# Patient Record
Sex: FEMALE | Race: WHITE | NOT HISPANIC OR LATINO | Employment: OTHER | URBAN - METROPOLITAN AREA
[De-identification: names, ages, dates, MRNs, and addresses within clinical notes are randomized per-mention and may not be internally consistent; named-entity substitution may affect disease eponyms.]

---

## 2017-05-11 ENCOUNTER — GENERIC CONVERSION - ENCOUNTER (OUTPATIENT)
Dept: OTHER | Facility: OTHER | Age: 77
End: 2017-05-11

## 2017-07-24 ENCOUNTER — GENERIC CONVERSION - ENCOUNTER (OUTPATIENT)
Dept: OTHER | Facility: OTHER | Age: 77
End: 2017-07-24

## 2018-04-17 ENCOUNTER — EVALUATION (OUTPATIENT)
Dept: PHYSICAL THERAPY | Facility: CLINIC | Age: 78
End: 2018-04-17
Payer: MEDICARE

## 2018-04-17 DIAGNOSIS — M17.12 PRIMARY LOCALIZED OSTEOARTHROSIS OF LOWER LEG, LEFT: Primary | ICD-10-CM

## 2018-04-17 PROCEDURE — G8979 MOBILITY GOAL STATUS: HCPCS

## 2018-04-17 PROCEDURE — 97535 SELF CARE MNGMENT TRAINING: CPT

## 2018-04-17 PROCEDURE — G8978 MOBILITY CURRENT STATUS: HCPCS

## 2018-04-17 PROCEDURE — 97162 PT EVAL MOD COMPLEX 30 MIN: CPT

## 2018-04-17 NOTE — LETTER
2018    Sam Jamil PA-C  [address] [facility] [address]    Patient: Jayro Serrano   YOB: 1940   Date of Visit: 2018     Encounter Diagnosis     ICD-10-CM    1  Primary localized osteoarthrosis of lower leg, left M17 12        Dear Dr Reynaldo Munoz:    Please review the attached Plan of Care from ELLI LUIS recent visit  Please verify that you agree therapy should continue by signing the attached document and sending it back to our office  If you have any questions or concerns, please don't hesitate to call  Sincerely,    Fallon Mcdermott PT      Referring Provider:      I certify that I have read the below Plan of Care and certify the need for these services furnished under this plan of treatment while under my care  Sam Jamil PA-C  [address]: [phone]          PT Evaluation     Today's date: 2018  Patient name: Jayro Serrano  : 1940  MRN: [id number]  Referring provider: Ender Askew PA-C  Dx:   Encounter Diagnosis     ICD-10-CM    1  Primary localized osteoarthrosis of lower leg, left M17 12                   Assessment  Impairments: abnormal gait, abnormal or restricted ROM, impaired physical strength, lacks appropriate home exercise program and pain with function    Assessment details: Pt presents with reports of intermittent left knee pain  Pain primarily medial aspect  Has hx of TKR and revision prior  Reports consistent ache in knee  Reports difficulties with steps but no trouble ambulating on level surfaces  Reports knee does feel weak  Pt will benefit from PT tx to eliminate symptoms and restore normal functional level  Goals  ST  Decrease pain 50% 4 wk   2  Increase knee strength to 4+/5 4 wk  LT  Pt will report no pain 8 wk  2  Increase Knee ROM to WNL 8 wk  3  Increase knee strength to WNL 8 wk  4    Pt will report no limitations with ambulation on steps 8 wk  5  Pt will report no limitations with ADL's 8 wk    Plan  Patient would benefit from: PT eval  Planned modality interventions: cryotherapy and thermotherapy: hydrocollator packs  Planned therapy interventions: manual therapy, balance, strengthening, stretching, therapeutic exercise, home exercise program, functional ROM exercises and flexibility  Frequency: 3x week  Duration in weeks: 8  Treatment plan discussed with: patient        Subjective Evaluation    History of Present Illness  Mechanism of injury: Reports history of 2 left TKR  Reports she has been experiencing pain medial aspect of knee for the past few months  She reports pain lower tibial region at times as well  Went to Ortho MD and X-ray performed  Reports no abnormalities noted  Reports consistent "ache" in left knee due to multiple surgeries  Reports inconsistent instability/buckling left knee  Pain  Current pain ratin  At best pain ratin  At worst pain rating: 3  Location: Left Knee/Lower Tibia  Quality: dull ache          Objective     Tenderness   Left Knee   Tenderness in the MCL (distal) and pes anserinus       Additional Tenderness Details  Tender to palpation medial hamstring tendons, pes anserine region, and distal MCL    Active Range of Motion   Left Knee   Flexion: 120 degrees   Extension: 0 degrees     Right Knee   Flexion: 126 degrees   Extension: 0 degrees     Additional Active Range of Motion Details  Reports tightness at end rage flexion left  Decreased MM flexibility BLE    Mobility     Additional Mobility Details  Fair patellar mobility all planes    Strength/Myotome Testing     Left Knee   Flexion: 4  Extension: 4    Right Knee   Flexion: 5  Extension: 5    Additional Strength Details  Reports weakness with resisted extension left knee    Tests     Additional Tests Details  Discomfort medial knee with Valgus stress but no instability    Ambulation     Ambulation: Level Surfaces     Additional Level Surfaces Ambulation Details  Reports no significant limitations with gait on level surfaces    Ambulation: Stairs   Pattern: non-reciprocal  Pattern: non-reciprocal    Observational Gait   Decreased stride length         Flowsheet Rows    Flowsheet Row Most Recent Value   PT/OT G-Codes   Current Score  61   Projected Score  64   Assessment Type  Evaluation   G code set  Mobility: Walking & Moving Around   Mobility: Walking and Moving Around Current Status ()  CJ   Mobility: Walking and Moving Around Goal Status ()  CJ        Precautions:  Hx Left TKR and Revision    Specialty Daily Treatment Diary     Manual         Stretch BLE                                            Exercise Diary         [address] [facility]        nuep        TR/HR        Mini squat        Step ups        St hip flex/abd/ext        St hams curls        LAQ        Quad sets        Add sq        SAQ        Leg Press                                                                            Modalities        MHP        Cold Pack

## 2018-04-17 NOTE — PROGRESS NOTES
PT Evaluation     Today's date: 2018  Patient name: Lamin Nash  : 1940  MRN: [id number]  Referring provider: Shima Pena PA-C  Dx:   Encounter Diagnosis     ICD-10-CM    1  Primary localized osteoarthrosis of lower leg, left M17 12                   Assessment  Impairments: abnormal gait, abnormal or restricted ROM, impaired physical strength, lacks appropriate home exercise program and pain with function    Assessment details: Pt presents with reports of intermittent left knee pain  Pain primarily medial aspect  Has hx of TKR and revision prior  Reports consistent ache in knee  Reports difficulties with steps but no trouble ambulating on level surfaces  Reports knee does feel weak  Pt will benefit from PT tx to eliminate symptoms and restore normal functional level  Goals  ST  Decrease pain 50% 4 wk   2  Increase knee strength to 4+/5 4 wk  LT  Pt will report no pain 8 wk  2  Increase Knee ROM to WNL 8 wk  3  Increase knee strength to WNL 8 wk  4  Pt will report no limitations with ambulation on steps 8 wk  5  Pt will report no limitations with ADL's 8 wk    Plan  Patient would benefit from: PT eval  Planned modality interventions: cryotherapy and thermotherapy: hydrocollator packs  Planned therapy interventions: manual therapy, balance, strengthening, stretching, therapeutic exercise, home exercise program, functional ROM exercises and flexibility  Frequency: 3x week  Duration in weeks: 8  Treatment plan discussed with: patient        Subjective Evaluation    History of Present Illness  Mechanism of injury: Reports history of 2 left TKR  Reports she has been experiencing pain medial aspect of knee for the past few months  She reports pain lower tibial region at times as well  Went to Ortho MD and X-ray performed  Reports no abnormalities noted  Reports consistent "ache" in left knee due to multiple surgeries    Reports inconsistent instability/buckling left knee     Pain  Current pain ratin  At best pain ratin  At worst pain rating: 3  Location: Left Knee/Lower Tibia  Quality: dull ache          Objective     Tenderness   Left Knee   Tenderness in the MCL (distal) and pes anserinus  Additional Tenderness Details  Tender to palpation medial hamstring tendons, pes anserine region, and distal MCL    Active Range of Motion   Left Knee   Flexion: 120 degrees   Extension: 0 degrees     Right Knee   Flexion: 126 degrees   Extension: 0 degrees     Additional Active Range of Motion Details  Reports tightness at end rage flexion left  Decreased MM flexibility BLE    Mobility     Additional Mobility Details  Fair patellar mobility all planes    Strength/Myotome Testing     Left Knee   Flexion: 4  Extension: 4    Right Knee   Flexion: 5  Extension: 5    Additional Strength Details  Reports weakness with resisted extension left knee    Tests     Additional Tests Details  Discomfort medial knee with Valgus stress but no instability    Ambulation     Ambulation: Level Surfaces     Additional Level Surfaces Ambulation Details  Reports no significant limitations with gait on level surfaces    Ambulation: Stairs   Pattern: non-reciprocal  Pattern: non-reciprocal    Observational Gait   Decreased stride length         Flowsheet Rows    Flowsheet Row Most Recent Value   PT/OT G-Codes   Current Score  61   Projected Score  64   Assessment Type  Evaluation   G code set  Mobility: Walking & Moving Around   Mobility: Walking and Moving Around Current Status ()  CJ   Mobility: Walking and Moving Around Goal Status ()  CJ        Precautions:  Hx Left TKR and Revision    Specialty Daily Treatment Diary     Manual         Stretch BLE                                            Exercise Diary         [address] [facility]        TR/HR        Mini squat        Step ups        St hip flex/abd/ext        St hams curls        LAQ        Quad sets        Add sq        SAQ        Leg Press                                                                            Modalities        P        Cold Pack

## 2018-04-23 ENCOUNTER — TRANSCRIBE ORDERS (OUTPATIENT)
Dept: PHYSICAL THERAPY | Facility: CLINIC | Age: 78
End: 2018-04-23

## 2018-04-23 ENCOUNTER — OFFICE VISIT (OUTPATIENT)
Dept: PHYSICAL THERAPY | Facility: CLINIC | Age: 78
End: 2018-04-23
Payer: MEDICARE

## 2018-04-23 DIAGNOSIS — M17.12 OSTEOARTHRITIS OF LEFT KNEE, UNSPECIFIED OSTEOARTHRITIS TYPE: Primary | ICD-10-CM

## 2018-04-23 DIAGNOSIS — M17.12 PRIMARY LOCALIZED OSTEOARTHROSIS OF LOWER LEG, LEFT: Primary | ICD-10-CM

## 2018-04-23 PROCEDURE — 97110 THERAPEUTIC EXERCISES: CPT

## 2018-04-23 PROCEDURE — 97150 GROUP THERAPEUTIC PROCEDURES: CPT

## 2018-04-23 PROCEDURE — 97140 MANUAL THERAPY 1/> REGIONS: CPT

## 2018-04-23 NOTE — PROGRESS NOTES
Daily Note     Today's date: 2018  Patient name: Catarina Roberts  : 1940  MRN: [id number]  Referring provider: Jayne Mason PA-C  Dx:   Encounter Diagnosis     ICD-10-CM    1  Primary localized osteoarthrosis of lower leg, left M17 12                   Subjective: The patient states that she does her exercises at home  Objective: See treatment diary below      Assessment: Tolerated treatment well  Patient exhibited good technique with therapeutic exercises and would benefit from continued PT  The patient did have tightness in her L hamstrings  Plan: Continue per plan of care       Precautions:  Hx Left TKR and Revision     Specialty Daily Treatment Diary      Manual  18           Stretch BLE  15'                                                                         Exercise Diary   18           [address] [facility]  [place]           nustep L1 10'           TR/HR  30x           Mini squat  30x           Step ups             St hip flex/abd/ext  30x           St hams curls  30x           LAQ             Quad sets  :05 30x           Add sq  :05 30x           SAQ             Leg Press                                                                                                                                   Modalities 18           [facility]             Cold Pack Declined

## 2018-04-25 ENCOUNTER — OFFICE VISIT (OUTPATIENT)
Dept: PHYSICAL THERAPY | Facility: CLINIC | Age: 78
End: 2018-04-25
Payer: MEDICARE

## 2018-04-25 DIAGNOSIS — M17.12 PRIMARY LOCALIZED OSTEOARTHROSIS OF LOWER LEG, LEFT: Primary | ICD-10-CM

## 2018-04-25 PROCEDURE — 97140 MANUAL THERAPY 1/> REGIONS: CPT

## 2018-04-25 PROCEDURE — 97110 THERAPEUTIC EXERCISES: CPT

## 2018-04-25 NOTE — PROGRESS NOTES
Daily Note     Today's date: 2018  Patient name: Griselda Garcia  : 1940  MRN: [id number]  Referring provider: Carlos Pelayo PA-C  Dx:   Encounter Diagnosis     ICD-10-CM    1  Primary localized osteoarthrosis of lower leg, left M17 12                   Subjective:   I was very active the last few days"      Objective: See treatment diary below   Pain in 2/10      Assessment: Tolerated treatment well  Patient exhibited good technique with therapeutic exercises    Additional therex and weight tolerated without incidence  Plan: Continue per plan of care       Precautions:  Hx Left TKR and Revision     Specialty Daily Treatment Diary      Manual  18         Stretch BLE  15'  15                                                                       Exercise Diary   18         [address] [facility]  [place]         nustep L1 10'  l2 10         TR/HR  30x  30         Mini squat  30x  30         Step ups             St hip flex/abd/ext  30x  30         St hams curls  30x  30         LAQ    30         Quad sets  :05 30x  30  x5         Add sq  :05 30x  30  x5         SAQ    30         Leg Press                                                                                                                                   Modalities 18k         P             Cold Pack Declined

## 2018-04-30 ENCOUNTER — OFFICE VISIT (OUTPATIENT)
Dept: PHYSICAL THERAPY | Facility: CLINIC | Age: 78
End: 2018-04-30
Payer: MEDICARE

## 2018-04-30 DIAGNOSIS — M17.12 PRIMARY LOCALIZED OSTEOARTHROSIS OF LOWER LEG, LEFT: Primary | ICD-10-CM

## 2018-04-30 PROCEDURE — 97140 MANUAL THERAPY 1/> REGIONS: CPT

## 2018-04-30 PROCEDURE — 97110 THERAPEUTIC EXERCISES: CPT

## 2018-04-30 NOTE — PROGRESS NOTES
Daily Note     Today's date: 2018  Patient name: Connor East  : 1940  MRN: [id number]  Referring provider: Carlos Rouse PA-C  Dx:   Encounter Diagnosis     ICD-10-CM    1  Primary localized osteoarthrosis of lower leg, left M17 12                   Subjective: "Its actually feeling better"        Objective: See treatment diary below  Pain in 0/10      Assessment: Tolerated treatment well  Patient exhibited good technique with therapeutic exercises     Patient reports improved s/s since initiating PT and greater functional abilities  Additional therex tolerated without incidence  Plan: Continue per plan of care       Precautions:  Hx Left TKR and Revision     Specialty Daily Treatment Diary      Manual  18       Stretch BLE  15'  15  15                                                                     Exercise Diary   18       [address] [facility]  [place]  [place]  10   l       nustep L1 10'  l2 10  d/c       TR/HR  30x  30  30       Mini squat  30x  30 30       Step ups             St hip flex/abd/ext  30x  30  30       St hams curls  30x  30  30       LAQ    30  30       Quad sets  :05 30x  30  x5  30       Add sq  :05 30x  30  x5  30       SAQ    30  30       Leg Press      30 25#       Add supine      20" 5                                                                                                               Modalities 18k  18       MHP             Cold Pack Declined    declined

## 2018-05-02 ENCOUNTER — OFFICE VISIT (OUTPATIENT)
Dept: PHYSICAL THERAPY | Facility: CLINIC | Age: 78
End: 2018-05-02
Payer: MEDICARE

## 2018-05-02 DIAGNOSIS — M17.12 PRIMARY LOCALIZED OSTEOARTHROSIS OF LOWER LEG, LEFT: Primary | ICD-10-CM

## 2018-05-02 PROCEDURE — 97140 MANUAL THERAPY 1/> REGIONS: CPT

## 2018-05-02 PROCEDURE — 97110 THERAPEUTIC EXERCISES: CPT

## 2018-05-02 NOTE — PROGRESS NOTES
Daily Note     Today's date: 2018  Patient name: Carmelita Gilbert  : 1940  MRN: [id number]  Referring provider: Tello Aburto PA-C  Dx:   Encounter Diagnosis     ICD-10-CM    1  Primary localized osteoarthrosis of lower leg, left M17 12                   Subjective: The patient states that she is doing ok today  Objective: See treatment diary below      Assessment: Tolerated treatment well  Patient exhibited good technique with therapeutic exercises and would benefit from continued PT  Patient did well with her strengthening exercises  Plan: Continue per plan of care       Precautions:  Hx Left TKR and Revision     Specialty Daily Treatment Diary      Manual  18     Stretch BLE  15'  15  15 15'                                                                   Exercise Diary   18     [address] [facility]  [place]  10   l 10 L 2     nustep L1 10'  l2 10  d/c       TR/HR  30x  30  30  30x     Mini squat  30x  30 30  30x     Step ups             St hip flex/abd/ext  30x  30  30       St hams curls  30x  30  30  30x     LAQ    30  30  30x     Quad sets  :05 30x  30  x5  30       Add sq  :05 30x  30  x5  30  30x     SAQ    30  30       Leg Press      30 25# 30# 30x     Add supine      20" 5       Leg Curl       20# 30x     Leg Extension       15# 30x                                                                                 Modalities 18k  18     MHP             Cold Pack Declined    declined Declined

## 2018-05-04 ENCOUNTER — OFFICE VISIT (OUTPATIENT)
Dept: PHYSICAL THERAPY | Facility: CLINIC | Age: 78
End: 2018-05-04
Payer: MEDICARE

## 2018-05-04 DIAGNOSIS — M17.12 PRIMARY LOCALIZED OSTEOARTHROSIS OF LOWER LEG, LEFT: Primary | ICD-10-CM

## 2018-05-04 PROCEDURE — 97110 THERAPEUTIC EXERCISES: CPT

## 2018-05-04 PROCEDURE — 97140 MANUAL THERAPY 1/> REGIONS: CPT

## 2018-05-04 NOTE — PROGRESS NOTES
Daily Note     Today's date: 2018  Patient name: Mirna Giles  : 1940  MRN: [id number]  Referring provider: Bang Kerr PA-C  Dx:   Encounter Diagnosis     ICD-10-CM    1  Primary localized osteoarthrosis of lower leg, left M17 12                   Subjective: I'm tired today  Objective: See treatment diary below      Assessment: Tolerated treatment well  Pt requested modified tx due to feeling tired today  Reports BLE feeling improved  Reports intermittent LBP and LLE numbness  Patient demonstrated fatigue post treatment      Plan: Continue per plan of care         Precautions:  Hx Left TKR and Revision     Specialty Daily Treatment Diary      Manual  18   Stretch BLE  15'  15  15 15'  15'                                                                 Exercise Diary   18   [address] [facility]  [place]  10   l 10 L 2  L3 10'   nustep L1 10'  l2 10  d/c       TR/HR  30x  30  30  30x     Mini squat  30x  30 30  30x     Step ups             St hip flex/abd/ext  30x  30  30       St hams curls  30x  30  30  30x     LAQ    30  30  30x     Quad sets  :05 30x  30  x5  30       Add sq  :05 30x  30  x5  30  30x     SAQ    30  30       Leg Press      30 25# 30# 30x  30#  30x   Add supine      20" 5       Leg Curl       20# 30x  20#  30x   Leg Extension       15# 30x  15#  30x                                                                               Modalities 18k  18     MHP             Cold Pack Declined    declined Declined

## 2018-05-07 ENCOUNTER — OFFICE VISIT (OUTPATIENT)
Dept: PHYSICAL THERAPY | Facility: CLINIC | Age: 78
End: 2018-05-07
Payer: MEDICARE

## 2018-05-07 DIAGNOSIS — M17.12 PRIMARY LOCALIZED OSTEOARTHROSIS OF LOWER LEG, LEFT: Primary | ICD-10-CM

## 2018-05-07 PROCEDURE — 97110 THERAPEUTIC EXERCISES: CPT

## 2018-05-07 PROCEDURE — 97140 MANUAL THERAPY 1/> REGIONS: CPT

## 2018-05-07 NOTE — PROGRESS NOTES
Daily Note     Today's date: 2018  Patient name: Clay Latham  : 1940  MRN: [id number]  Referring provider: Paula Miller PA-C  Dx:   Encounter Diagnosis     ICD-10-CM    1  Primary localized osteoarthrosis of lower leg, left M17 12                   Subjective:   My back is hurting today"        Objective: See treatment diary below   Pain in 2/10   Out 2/10      Assessment:    Good tolerance with therex  Minimal end range pain evident  Tolerated additional therex without incidence  Plan: Continue per plan of care       Precautions:  Hx Left TKR and Revision     Specialty Daily Treatment Diary      Manual  18   Stretch BLE  15'  15  15 15'  15'                                                                 Exercise Diary   18   [address] [facility] L3 10  [place]  10   l 10 L 2  L3 10'   nustep D/c  l2 10  d/c       TR/HR    30  30  30x     Mini squat   30 30  30x     Step ups             St hip flex/abd/ext    30  30       St hams curls   30  30  30x     LAQ    30  30  30x     Quad sets  :  30  x5  30       Add sq    30  x5  30  30x     SAQ    30  30       Leg Press  30# 30x    30 25# 30# 30x  30#  30x   Add supine      20" 5       Leg Curl  20X 30x     20# 30x  20#  30x   Leg Extension  15# 30x     15# 30x  15#  30x                                                                               Modalities 18k  18     MHP             Cold Pack Declined Declined  declined Declined

## 2018-05-09 ENCOUNTER — OFFICE VISIT (OUTPATIENT)
Dept: PHYSICAL THERAPY | Facility: CLINIC | Age: 78
End: 2018-05-09
Payer: MEDICARE

## 2018-05-09 DIAGNOSIS — M17.12 PRIMARY LOCALIZED OSTEOARTHROSIS OF LOWER LEG, LEFT: Primary | ICD-10-CM

## 2018-05-09 PROCEDURE — 97140 MANUAL THERAPY 1/> REGIONS: CPT

## 2018-05-09 PROCEDURE — 97110 THERAPEUTIC EXERCISES: CPT

## 2018-05-09 NOTE — PROGRESS NOTES
Daily Note     Today's date: 2018  Patient name: Zhane Rome  : 1940  MRN: [id number]  Referring provider: Inés Ledezma PA-C  Dx:   Encounter Diagnosis     ICD-10-CM    1  Primary localized osteoarthrosis of lower leg, left M17 12                   Subjective:  "Im great"      Objective: See treatment diary below      Assessment: Tolerated treatment well  Patient exhibited good technique with therapeutic exercises     Additional weight with therex ok  Pt reported minimal SOB following [facility]   Pt declined modalities  Plan: Continue per plan of care       recautions:  Hx Left TKR and Revision     Specialty Daily Treatment Diary      Manual  18   Stretch BLE  15'  15  15 15'  15'                                                                 Exercise Diary   18   [facility] L3 10 L2 10  10   l 10 L 2  L3 10'   nustep D/c D/c  d/c       TR/HR    30  30  30x     Mini squat   30 30  30x     Step ups             St hip flex/abd/ext    30  1#  30       St hams curls   30   1#  30  30x     LAQ    30   1#  30  30x     Quad sets  :  30  x5  30       Add sq    30  x5  30  30x     SAQ    30  30       Leg Press  30# 30x  30# 30x  30 25# 30# 30x  30#  30x   Add supine      20" 5       Leg Curl  20X 30x  20# 30x   20# 30x  20#  30x   Leg Extension  15# 30x  15# 30   15# 30x  15#  30x                                                                               Modalities 18     MHP             Cold Pack Declined Declined  declined Declined

## 2018-05-10 ENCOUNTER — OFFICE VISIT (OUTPATIENT)
Dept: PHYSICAL THERAPY | Facility: CLINIC | Age: 78
End: 2018-05-10
Payer: MEDICARE

## 2018-05-10 DIAGNOSIS — M17.12 PRIMARY LOCALIZED OSTEOARTHROSIS OF LOWER LEG, LEFT: Primary | ICD-10-CM

## 2018-05-10 PROCEDURE — 97140 MANUAL THERAPY 1/> REGIONS: CPT

## 2018-05-10 PROCEDURE — 97110 THERAPEUTIC EXERCISES: CPT

## 2018-05-10 NOTE — PROGRESS NOTES
Daily Note     Today's date: 5/10/2018  Patient name: Josselin Patrick  : 1940  MRN: [id number]  Referring provider: Brandon Sy PA-C  Dx:   Encounter Diagnosis     ICD-10-CM    1  Primary localized osteoarthrosis of lower leg, left M17 12                   Subjective:  "Im great"      Objective: See treatment diary below      Assessment: Tolerated treatment well  Patient exhibited good technique with therapeutic exercises     Additional weight with therex ok  Pt reported minimal SOB following [facility]   Pt declined modalities  Plan: Continue per plan of care  recautions:  Hx Left TKR and Revision     Specialty Daily Treatment Diary      Manual  5/7/18 5/9/18 5/10/18 5/2/18  5-4-18   Stretch BLE  15'  15  15 15'  15'                                                                 Exercise Diary   5/7/18  5/9/18  5/10/18 5/2/18  5-4-18   [facility] L3 10 L2 10  10    10 L 2  L3 10'   nustep D/c D/c        TR/HR    30  30  30x     Mini squat   30 30  30x     Step ups             St hip flex/abd/ext    30  1#  301#       St hams curls   30   1#  301#  30x     LAQ    30   1#  301#  30x     Quad sets  :  30  x5  301#       Add sq    30  x5  30  30x     SAQ    30  30       Leg Press  30# 30x  30# 30x  30 25# 30# 30x  30#  30x   Add supine      20" 5       Leg Curl  20X 30x  20# 30x   20# 30x  20#  30x   Leg Extension  15# 30x  15# 30   15# 30x  15#  30x                                                                               Modalities 4/23/18  4/25/18 5/10/18 5/2/18     MHP            Cold Pack Declined Declined  declined Declined                 Daily Note     Today's date: 5/10/2018  Patient name: Josselin Patrick  : 1940  MRN: [id number]  Referring provider: Brandon Sy PA-C  Dx:   Encounter Diagnosis     ICD-10-CM    1  Primary localized osteoarthrosis of lower leg, left M17 12                   Subjective:  Im ok      Objective: See treatment diary below      Assessment: Tolerated treatment well   Patient exhibited good technique with therapeutic exercises      Plan: Continue per plan of care      ecautions:  Hx Left TKR and Revision     Specialty Daily Treatment Diary      Manual  5/7/18 5/9/18 5/10/18 5/2/18  5-4-18   Stretch BLE  15'  15  15 15'  15'                                                                 Exercise Diary   5/7/18  5/9/18 5/10/18 5/2/18  5-4-18   [facility] L3 10 L2 10  10    10 L 2  L3 10'   nustep D/c D/c  d/c       TR/HR    30  30  30x     Mini squat   30 30  30x     Step ups             St hip flex/abd/ext    30  1#  301#       St hams curls   30   1#  301#  30x     LAQ    30   1#  30 1#  30x     Quad sets  :  30  x5  30       Add sq    30  x5  30  30x     SAQ    30  30       Leg Press  30# 30x  30# 30x  30 25# 30# 30x  30#  30x   Add supine      20" 5       Leg Curl  20X 30x  20# 30x  20# 30x 20# 30x  20#  30x   Leg Extension  15# 30x  15# 30  15# 30 15# 30x  15#  30x                                                                               Modalities 4/23/18  4/25/18  5/10/18 5/2/18     MHP             Cold Pack Declined Declined  declined Declined

## 2018-05-14 ENCOUNTER — APPOINTMENT (OUTPATIENT)
Dept: PHYSICAL THERAPY | Facility: CLINIC | Age: 78
End: 2018-05-14
Payer: MEDICARE

## 2018-05-16 ENCOUNTER — EVALUATION (OUTPATIENT)
Dept: PHYSICAL THERAPY | Facility: CLINIC | Age: 78
End: 2018-05-16
Payer: MEDICARE

## 2018-05-16 DIAGNOSIS — M17.12 PRIMARY LOCALIZED OSTEOARTHROSIS OF LOWER LEG, LEFT: Primary | ICD-10-CM

## 2018-05-16 PROCEDURE — 97164 PT RE-EVAL EST PLAN CARE: CPT

## 2018-05-16 PROCEDURE — 97140 MANUAL THERAPY 1/> REGIONS: CPT

## 2018-05-16 PROCEDURE — G8979 MOBILITY GOAL STATUS: HCPCS

## 2018-05-16 PROCEDURE — G8978 MOBILITY CURRENT STATUS: HCPCS

## 2018-05-16 PROCEDURE — 97110 THERAPEUTIC EXERCISES: CPT

## 2018-05-16 NOTE — PROGRESS NOTES
Daily Note     Today's date: 2018  Patient name: Lynda Elder  : 1940  MRN: [id number]  Referring provider: Lele Bowens PA-C  Dx:   Encounter Diagnosis     ICD-10-CM    1  Primary localized osteoarthrosis of lower leg, left M17 12                   Subjective: The patient states that she is ok today  Objective: See treatment diary below      Assessment: Tolerated treatment well  Patient exhibited good technique with therapeutic exercises and would benefit from continued PT  The patient did have better strength in her L LE  ReEval performed during today's therapy session  Plan: Continue per plan of care       recautions:  Hx Left TKR and Revision     Specialty Daily Treatment Diary      Manual  5/7/18 5/9/18 5/10/18 5/16/18  5-4-18   Stretch BLE  15'  15  15 15'  15'                                                                 Exercise Diary   5/7/18  5/9/18  5/10/18 5/16/18  5-4-18   [address] [facility] L3 10 L2 10  10    10 L 3  L3 10'   nustep D/c D/c         TR/HR    30  30  30x     Mini squat    30 30  30x     Step ups             St hip flex/abd/ext    30  1#  301#  2# 30x     St hams curls    30   1#  301#  30x 2#     LAQ    30   1#  301#  30x 2#     Quad sets  :  30  x5  301#  30x 2#     Add sq    30  x5  30  30x 5"     SAQ    30  30       Leg Press  30# 30x  30# 30x  30 25# 30# 30x  30#  30x   Add supine      20" 5       Leg Curl  20X 30x  20# 30x   20# 30x  20#  30x   Leg Extension  15# 30x  15# 30   15# 30x  15#  30x                                                                               Modalities 4/23/18  4/25/18 5/10/18 5/16/18     MHP             Cold Pack Declined Declined  declined Declined                 Nutrition Services    Calorie Counts  5/18: 1365 kcal, 45 g protein (3 meals (includes outside food), 2 Ensure Clear)      Stephanie Mesa, RD

## 2018-05-16 NOTE — LETTER
May 18, 2018    Violeta Zheng PA-C  [address] [place] [address]    Patient: Ethan Rodriguez   YOB: 1940   Date of Visit: 2018     Encounter Diagnosis     ICD-10-CM    1  Primary localized osteoarthrosis of lower leg, left M17 12        Dear Dr Gen Rose:    Please review the attached Plan of Care from ELLI LUIS recent visit  Please verify that you agree therapy should continue by signing the attached document and sending it back to our office  If you have any questions or concerns, please don't hesitate to call  Sincerely,    Amber Clay PT      Referring Provider:      I certify that I have read the below Plan of Care and certify the need for these services furnished under this plan of treatment while under my care  Vioelta Zheng PA-C  [place]: [phone]          Daily Note     Today's date: 2018  Patient name: Ethan Rodriguez  : 1940  MRN: [id number]  Referring provider: Rossi Waldrop PA-C  Dx:   Encounter Diagnosis     ICD-10-CM    1  Primary localized osteoarthrosis of lower leg, left M17 12                   Subjective: The patient states that she is ok today  Objective: See treatment diary below      Assessment: Tolerated treatment well  Patient exhibited good technique with therapeutic exercises and would benefit from continued PT  The patient did have better strength in her L LE  ReEval performed during today's therapy session  Plan: Continue per plan of care       recautions:  Hx Left TKR and Revision     Specialty Daily Treatment Diary      Manual  5/7/18 5/9/18 5/10/18 5/16/18  5-4-18   Stretch BLE  15'  15  15 15'  15'                                                                 Exercise Diary   5/7/18  5/9/18  5/10/18 5/16/18  5-4-18   [facility] L3 10 L2 10  10    10 L 3  L3 10'   nustep D/c D/c         TR/HR    30  30  30x     Mini squat    30 30  30x     Step ups             St hip flex/abd/ext    30  1#  301#  2# 30x     St hams curls    30   1#  301#  30x 2#     LAQ    30   1#  301#  30x 2#     Quad sets  :  30  x5  301#  30x 2#     Add sq    30  x5  30  30x 5"     SAQ    30  30       Leg Press  30# 30x  30# 30x  30 25# 30# 30x  30#  30x   Add supine      20" 5       Leg Curl  20X 30x  20# 30x   20# 30x  20#  30x   Leg Extension  15# 30x  15# 30   15# 30x  15#  30x                                                                               Modalities 4/23/18  4/25/18 5/10/18 5/16/18     MHP             Cold Pack Declined Declined  declined Declined                           PT Re-Evaluation/Discharge    Today's date: 2018  Patient name: Ryan Alejandro  : 1940  MRN: [id number]  Referring provider: Marisela Santana PA-C  Dx:   Encounter Diagnosis     ICD-10-CM    1  Primary localized osteoarthrosis of lower leg, left M17 12        Start Time: 0900  Stop Time: 1000  Total time in clinic (min): 60 minutes    Assessment  Impairments: abnormal gait, abnormal or restricted ROM, impaired physical strength, lacks appropriate home exercise program and pain with function    Assessment details: Pt presented with reports of intermittent left knee pain  She progressed well with PT tx  She reports no pain or limitations with gait or ADL's  At this time pt requests transition to HEP as she feels no further PT tx needed  D/C PT services  Goals  ST  Decrease pain 50% 4 wk   2  Increase knee strength to 4+/5 4 wk  LT  Pt will report no pain 8 wk  2  Increase Knee ROM to WNL 8 wk  3  Increase knee strength to WNL 8 wk  4  Pt will report no limitations with ambulation on steps 8 wk  5    Pt will report no limitations with ADL's 8 wk    Plan  Patient would benefit from: PT eval  Planned modality interventions: cryotherapy and thermotherapy: hydrocollator packs  Planned therapy interventions: manual therapy, balance, strengthening, stretching, therapeutic exercise, home exercise program, functional ROM exercises and flexibility  Treatment plan discussed with: patient  Plan details: D/C PT services        Subjective Evaluation    History of Present Illness  Mechanism of injury: Reports history of 2 left TKR  Reports she has been experiencing pain medial aspect of knee for the past few months  She reports pain lower tibial region at times as well  Went to Ortho MD and X-ray performed  Reports no abnormalities noted  Reports consistent "ache" in left knee due to multiple surgeries  Reports inconsistent instability/buckling left knee      Pain  Current pain ratin  At best pain ratin  At worst pain ratin  Location: Left Knee/Lower Tibia          Objective     Tenderness     Additional Tenderness Details  No tenderness noted    Active Range of Motion   Left Knee   Flexion: 110 degrees   Extension: 0 degrees     Right Knee   Flexion: 120 degrees   Extension: 0 degrees     Additional Active Range of Motion Details  Reports tightness at end rage flexion left      Mobility     Additional Mobility Details  Fair patellar mobility all planes    Strength/Myotome Testing     Left Knee   Flexion: 5  Extension: 5    Right Knee   Flexion: 5  Extension: 5    Ambulation     Ambulation: Level Surfaces     Additional Level Surfaces Ambulation Details  Reports no significant limitations with gait on level surfaces or steps      Flowsheet Rows      Most Recent Value   PT/OT G-Codes   Current Score  83   Projected Score  64   Assessment Type  Re-evaluation   G code set  Mobility: Walking & Moving Around   Mobility: Walking and Moving Around Current Status ()  CI   Mobility: Walking and Moving Around Goal Status ()  CJ

## 2018-05-16 NOTE — PROGRESS NOTES
PT Re-Evaluation/Discharge    Today's date: 2018  Patient name: Monae Bal  : 1940  MRN: [id number]  Referring provider: Daniel Escalona PA-C  Dx:   Encounter Diagnosis     ICD-10-CM    1  Primary localized osteoarthrosis of lower leg, left M17 12        Start Time: 0900  Stop Time: 1000  Total time in clinic (min): 60 minutes    Assessment  Impairments: abnormal gait, abnormal or restricted ROM, impaired physical strength, lacks appropriate home exercise program and pain with function    Assessment details: Pt presented with reports of intermittent left knee pain  She progressed well with PT tx  She reports no pain or limitations with gait or ADL's  At this time pt requests transition to HEP as she feels no further PT tx needed  D/C PT services  Goals  ST  Decrease pain 50% 4 wk   2  Increase knee strength to 4+/5 4 wk  LT  Pt will report no pain 8 wk  2  Increase Knee ROM to WNL 8 wk  3  Increase knee strength to WNL 8 wk  4  Pt will report no limitations with ambulation on steps 8 wk  5  Pt will report no limitations with ADL's 8 wk    Plan  Patient would benefit from: PT eval  Planned modality interventions: cryotherapy and thermotherapy: hydrocollator packs  Planned therapy interventions: manual therapy, balance, strengthening, stretching, therapeutic exercise, home exercise program, functional ROM exercises and flexibility  Treatment plan discussed with: patient  Plan details: D/C PT services        Subjective Evaluation    History of Present Illness  Mechanism of injury: Reports history of 2 left TKR  Reports she has been experiencing pain medial aspect of knee for the past few months  She reports pain lower tibial region at times as well  Went to Ortho MD and X-ray performed  Reports no abnormalities noted  Reports consistent "ache" in left knee due to multiple surgeries  Reports inconsistent instability/buckling left knee      Pain  Current pain rating: 0  At best pain ratin  At worst pain ratin  Location: Left Knee/Lower Tibia          Objective     Tenderness     Additional Tenderness Details  No tenderness noted    Active Range of Motion   Left Knee   Flexion: 110 degrees   Extension: 0 degrees     Right Knee   Flexion: 120 degrees   Extension: 0 degrees     Additional Active Range of Motion Details  Reports tightness at end rage flexion left      Mobility     Additional Mobility Details  Fair patellar mobility all planes    Strength/Myotome Testing     Left Knee   Flexion: 5  Extension: 5    Right Knee   Flexion: 5  Extension: 5    Ambulation     Ambulation: Level Surfaces     Additional Level Surfaces Ambulation Details  Reports no significant limitations with gait on level surfaces or steps      Flowsheet Rows      Most Recent Value   PT/OT G-Codes   Current Score  83   Projected Score  64   Assessment Type  Re-evaluation   G code set  Mobility: Walking & Moving Around   Mobility: Walking and Moving Around Current Status ()  CI   Mobility: Walking and Moving Around Goal Status ()  CJ

## 2018-05-18 ENCOUNTER — APPOINTMENT (OUTPATIENT)
Dept: PHYSICAL THERAPY | Facility: CLINIC | Age: 78
End: 2018-05-18
Payer: MEDICARE

## 2018-05-24 ENCOUNTER — TRANSCRIBE ORDERS (OUTPATIENT)
Dept: PHYSICAL THERAPY | Facility: CLINIC | Age: 78
End: 2018-05-24

## 2018-05-24 DIAGNOSIS — M17.12 PRIMARY LOCALIZED OSTEOARTHROSIS OF LOWER LEG, LEFT: Primary | ICD-10-CM

## 2019-01-14 ENCOUNTER — TELEPHONE (OUTPATIENT)
Dept: INTERNAL MEDICINE CLINIC | Facility: CLINIC | Age: 79
End: 2019-01-14

## 2019-01-18 ENCOUNTER — HOSPITAL ENCOUNTER (EMERGENCY)
Facility: HOSPITAL | Age: 79
Discharge: HOME | End: 2019-01-18
Attending: EMERGENCY MEDICINE | Admitting: EMERGENCY MEDICINE
Payer: MEDICARE

## 2019-01-18 ENCOUNTER — APPOINTMENT (EMERGENCY)
Dept: RADIOLOGY | Facility: HOSPITAL | Age: 79
End: 2019-01-18
Payer: MEDICARE

## 2019-01-18 VITALS
HEART RATE: 74 BPM | WEIGHT: 210.1 LBS | OXYGEN SATURATION: 96 % | BODY MASS INDEX: 38.43 KG/M2 | SYSTOLIC BLOOD PRESSURE: 135 MMHG | DIASTOLIC BLOOD PRESSURE: 61 MMHG | RESPIRATION RATE: 18 BRPM | TEMPERATURE: 98.1 F

## 2019-01-18 DIAGNOSIS — R53.1 WEAKNESS: Primary | ICD-10-CM

## 2019-01-18 LAB
ALBUMIN SERPL BCP-MCNC: 3.8 G/DL (ref 3.5–5)
ALP SERPL-CCNC: 95 U/L (ref 46–116)
ALT SERPL W P-5'-P-CCNC: 26 U/L (ref 12–78)
ANION GAP SERPL CALCULATED.3IONS-SCNC: 8 MMOL/L (ref 4–13)
AST SERPL W P-5'-P-CCNC: 19 U/L (ref 5–45)
ATRIAL RATE: 77 BPM
BASOPHILS # BLD AUTO: 0.08 THOUSANDS/ΜL (ref 0–0.1)
BASOPHILS NFR BLD AUTO: 2 % (ref 0–1)
BILIRUB SERPL-MCNC: 0.4 MG/DL (ref 0.2–1)
BUN SERPL-MCNC: 20 MG/DL (ref 5–25)
CALCIUM SERPL-MCNC: 8.7 MG/DL (ref 8.3–10.1)
CHLORIDE SERPL-SCNC: 102 MMOL/L (ref 100–108)
CO2 SERPL-SCNC: 29 MMOL/L (ref 21–32)
CREAT SERPL-MCNC: 0.82 MG/DL (ref 0.6–1.3)
EOSINOPHIL # BLD AUTO: 0.21 THOUSAND/ΜL (ref 0–0.61)
EOSINOPHIL NFR BLD AUTO: 4 % (ref 0–6)
ERYTHROCYTE [DISTWIDTH] IN BLOOD BY AUTOMATED COUNT: 13.5 % (ref 11.6–15.1)
GFR SERPL CREATININE-BSD FRML MDRD: 69 ML/MIN/1.73SQ M
GLUCOSE SERPL-MCNC: 103 MG/DL (ref 65–140)
HCT VFR BLD AUTO: 45 % (ref 34.8–46.1)
HGB BLD-MCNC: 15 G/DL (ref 11.5–15.4)
IMM GRANULOCYTES # BLD AUTO: 0.01 THOUSAND/UL (ref 0–0.2)
IMM GRANULOCYTES NFR BLD AUTO: 0 % (ref 0–2)
LYMPHOCYTES # BLD AUTO: 0.93 THOUSANDS/ΜL (ref 0.6–4.47)
LYMPHOCYTES NFR BLD AUTO: 18 % (ref 14–44)
MAGNESIUM SERPL-MCNC: 1.8 MG/DL (ref 1.6–2.6)
MCH RBC QN AUTO: 29.2 PG (ref 26.8–34.3)
MCHC RBC AUTO-ENTMCNC: 33.3 G/DL (ref 31.4–37.4)
MCV RBC AUTO: 88 FL (ref 82–98)
MONOCYTES # BLD AUTO: 0.53 THOUSAND/ΜL (ref 0.17–1.22)
MONOCYTES NFR BLD AUTO: 10 % (ref 4–12)
NEUTROPHILS # BLD AUTO: 3.35 THOUSANDS/ΜL (ref 1.85–7.62)
NEUTS SEG NFR BLD AUTO: 66 % (ref 43–75)
NRBC BLD AUTO-RTO: 0 /100 WBCS
P AXIS: 39 DEGREES
PLATELET # BLD AUTO: 193 THOUSANDS/UL (ref 149–390)
PMV BLD AUTO: 9.6 FL (ref 8.9–12.7)
POTASSIUM SERPL-SCNC: 3.9 MMOL/L (ref 3.5–5.3)
PR INTERVAL: 136 MS
PROT SERPL-MCNC: 7.2 G/DL (ref 6.4–8.2)
QRS AXIS: 43 DEGREES
QRSD INTERVAL: 78 MS
QT INTERVAL: 392 MS
QTC INTERVAL: 443 MS
RBC # BLD AUTO: 5.14 MILLION/UL (ref 3.81–5.12)
SODIUM SERPL-SCNC: 139 MMOL/L (ref 136–145)
T WAVE AXIS: 86 DEGREES
TROPONIN I SERPL-MCNC: <0.02 NG/ML
VENTRICULAR RATE: 77 BPM
WBC # BLD AUTO: 5.11 THOUSAND/UL (ref 4.31–10.16)

## 2019-01-18 PROCEDURE — 36415 COLL VENOUS BLD VENIPUNCTURE: CPT | Performed by: EMERGENCY MEDICINE

## 2019-01-18 PROCEDURE — 93005 ELECTROCARDIOGRAM TRACING: CPT

## 2019-01-18 PROCEDURE — 80053 COMPREHEN METABOLIC PANEL: CPT | Performed by: EMERGENCY MEDICINE

## 2019-01-18 PROCEDURE — 96360 HYDRATION IV INFUSION INIT: CPT

## 2019-01-18 PROCEDURE — 85025 COMPLETE CBC W/AUTO DIFF WBC: CPT | Performed by: EMERGENCY MEDICINE

## 2019-01-18 PROCEDURE — 93010 ELECTROCARDIOGRAM REPORT: CPT | Performed by: INTERNAL MEDICINE

## 2019-01-18 PROCEDURE — 83735 ASSAY OF MAGNESIUM: CPT | Performed by: EMERGENCY MEDICINE

## 2019-01-18 PROCEDURE — 99285 EMERGENCY DEPT VISIT HI MDM: CPT

## 2019-01-18 PROCEDURE — 84484 ASSAY OF TROPONIN QUANT: CPT | Performed by: EMERGENCY MEDICINE

## 2019-01-18 PROCEDURE — 71046 X-RAY EXAM CHEST 2 VIEWS: CPT

## 2019-01-18 RX ORDER — ASPIRIN 81 MG/1
81 TABLET, CHEWABLE ORAL DAILY
COMMUNITY

## 2019-01-18 RX ORDER — FLUOXETINE HYDROCHLORIDE 40 MG/1
40 CAPSULE ORAL DAILY
COMMUNITY

## 2019-01-18 RX ORDER — HYDROCHLOROTHIAZIDE 25 MG/1
25 TABLET ORAL DAILY
COMMUNITY

## 2019-01-18 RX ORDER — PANTOPRAZOLE SODIUM 40 MG/1
40 TABLET, DELAYED RELEASE ORAL DAILY
COMMUNITY

## 2019-01-18 RX ORDER — SIMVASTATIN 10 MG
10 TABLET ORAL
COMMUNITY

## 2019-01-18 RX ORDER — ALBUTEROL SULFATE 90 UG/1
1 AEROSOL, METERED RESPIRATORY (INHALATION) EVERY 6 HOURS PRN
COMMUNITY
End: 2020-09-29 | Stop reason: SDUPTHER

## 2019-01-18 RX ORDER — LEVOTHYROXINE SODIUM 0.05 MG/1
50 TABLET ORAL DAILY
COMMUNITY

## 2019-01-18 RX ADMIN — SODIUM CHLORIDE 1000 ML: 0.9 INJECTION, SOLUTION INTRAVENOUS at 13:41

## 2019-01-18 NOTE — ED PROVIDER NOTES
Pt Name: Librado Rhodes  MRN: [id number]  [place] 1940  Age/Sex: 69 y o  female  Date of evaluation: 2019  PCP: Alen Becker, [address] [place] [address]    Chief Complaint   Patient presents with    Weakness - Generalized     pt co of generalized weakness and hand tremors onset 10 days ago  dizzeiness "when i get up suddenely", per EMS pt was takig double dose of omeprazole          HPI    69 y o  female presenting with approximately 10 days of generalized mild weakness and occasional hand tremors  Patient notes that close friend of hers recently  suddenly a few weeks ago but feels that this is unrelated states that she has, and not feeling anxious  She also notes that she has been taking a double dose of her omeprazole for the last 30 days  Patient still able to walk and go about her business normally, eat and drink normally, but states that she overall feels somewhat weak and noticed that her hands shake a little bit on occasion  She expresses concern that her magnesium may be low from the omeprazole overdose or they could be primary side effect  She denies chest pain, shortness of breath, fever, trauma, focal numbness or weakness, changes in speech or vision, other symptoms  She denies alcohol use or benzodiazepine use  HPI      Past Medical and Surgical History    Past Medical History:   Diagnosis Date    Anxiety     COPD (chronic obstructive pulmonary disease) ([address] )     Depression     Disease of thyroid gland     Hypotension     Wrist fracture        Past Surgical History:   Procedure Laterality Date    BACK SURGERY  2017    INCONTINENCE SURGERY      NEPHRECTOMY TRANSPLANTED ORGAN      REPLACEMENT TOTAL KNEE BILATERAL         History reviewed  No pertinent family history      Social History   Substance Use Topics    Smoking status: Former Smoker    Smokeless tobacco: Never Used    Alcohol use No           Allergies    Allergies   Allergen Reactions    Fruit C [Ascorbate] Reports allergy to fresh fruit       Home Medications    Prior to Admission medications    Medication Sig Start Date End Date Taking? Authorizing Provider   UNKNOWN TO PATIENT     Historical Provider, MD           Review of Systems    Review of Systems   Constitutional: Negative for activity change, chills and fever  HENT: Negative for drooling and facial swelling  Eyes: Negative for pain, discharge and visual disturbance  Respiratory: Negative for apnea, cough, chest tightness, shortness of breath and wheezing  Cardiovascular: Negative for chest pain and leg swelling  Gastrointestinal: Negative for abdominal pain, constipation, diarrhea, nausea and vomiting  Genitourinary: Negative for difficulty urinating, dysuria and urgency  Musculoskeletal: Negative for arthralgias, back pain and gait problem  Skin: Negative for color change and rash  Neurological: Positive for tremors and weakness  Negative for dizziness, speech difficulty and headaches  Psychiatric/Behavioral: Negative for agitation, behavioral problems and confusion  All other systems reviewed and negative  Physical Exam      ED Triage Vitals   Temperature Pulse Respirations Blood Pressure SpO2   01/18/19 1311 01/18/19 1306 01/18/19 1306 01/18/19 1311 01/18/19 1306   98 1 °F (36 7 °C) 86 19 162/71 95 %      Temp Source Heart Rate Source Patient Position - Orthostatic VS BP Location FiO2 (%)   01/18/19 1311 01/18/19 1330 01/18/19 1330 01/18/19 1330 --   Oral Monitor Sitting Right arm       Pain Score       --                      Physical Exam   Constitutional: She is oriented to person, place, and time  She appears well-developed and well-nourished  HENT:   Head: Normocephalic and atraumatic  Nose: Nose normal    Mouth/Throat: Oropharynx is clear and moist    Eyes: Pupils are equal, round, and reactive to light  Conjunctivae and EOM are normal    Neck: Normal range of motion  Neck supple     Cardiovascular: Normal rate, regular rhythm, normal heart sounds and intact distal pulses  Pulmonary/Chest: Effort normal and breath sounds normal  No respiratory distress  She has no wheezes  She has no rales  Abdominal: Soft  She exhibits no distension  There is no tenderness  There is no rebound and no guarding  Musculoskeletal: Normal range of motion  She exhibits no edema or deformity  Neurological: She is alert and oriented to person, place, and time  No cranial nerve deficit or sensory deficit  She exhibits normal muscle tone  Coordination normal    Skin: Skin is warm and dry  No rash noted  No erythema  Psychiatric: She has a normal mood and affect   Her behavior is normal  Judgment and thought content normal             Diagnostic Results      Labs:    Results for orders placed or performed during the hospital encounter of 01/18/19   Comprehensive metabolic panel   Result Value Ref Range    Sodium 139 136 - 145 mmol/L    Potassium 3 9 3 5 - 5 3 mmol/L    Chloride 102 100 - 108 mmol/L    CO2 29 21 - 32 mmol/L    ANION GAP 8 4 - 13 mmol/L    BUN 20 5 - 25 mg/dL    Creatinine 0 82 0 60 - 1 30 mg/dL    Glucose 103 65 - 140 mg/dL    Calcium 8 7 8 3 - 10 1 mg/dL    AST 19 5 - 45 U/L    ALT 26 12 - 78 U/L    Alkaline Phosphatase 95 46 - 116 U/L    Total Protein 7 2 6 4 - 8 2 g/dL    Albumin 3 8 3 5 - 5 0 g/dL    Total Bilirubin 0 40 0 20 - 1 00 mg/dL    eGFR 69 ml/min/1 73sq m   CBC and differential   Result Value Ref Range    WBC 5 11 4 31 - 10 16 Thousand/uL    RBC 5 14 (H) 3 81 - 5 12 Million/uL    Hemoglobin 15 0 11 5 - 15 4 g/dL    Hematocrit 45 0 34 8 - 46 1 %    MCV 88 82 - 98 fL    MCH 29 2 26 8 - 34 3 pg    MCHC 33 3 31 4 - 37 4 g/dL    RDW 13 5 11 6 - 15 1 %    MPV 9 6 8 9 - 12 7 fL    Platelets 366 046 - 213 Thousands/uL    nRBC 0 /100 WBCs    Neutrophils Relative 66 43 - 75 %    Immat GRANS % 0 0 - 2 %    Lymphocytes Relative 18 14 - 44 %    Monocytes Relative 10 4 - 12 %    Eosinophils Relative 4 0 - 6 % Basophils Relative 2 (H) 0 - 1 %    Neutrophils Absolute 3 35 1 85 - 7 62 Thousands/µL    Immature Grans Absolute 0 01 0 00 - 0 20 Thousand/uL    Lymphocytes Absolute 0 93 0 60 - 4 47 Thousands/µL    Monocytes Absolute 0 53 0 17 - 1 22 Thousand/µL    Eosinophils Absolute 0 21 0 00 - 0 61 Thousand/µL    Basophils Absolute 0 08 0 00 - 0 10 Thousands/µL   Magnesium   Result Value Ref Range    Magnesium 1 8 1 6 - 2 6 mg/dL   Troponin I   Result Value Ref Range    Troponin I <0 02 <=0 04 ng/mL   ECG 12 lead   Result Value Ref Range    Ventricular Rate 77 BPM    Atrial Rate 77 BPM    WA Interval 136 ms    QRSD Interval 78 ms    QT Interval 392 ms    QTC Interval 443 ms    P [place] 39 degrees    QRS Axis 43 degrees    T Wave Axis 86 degrees       All labs reviewed and utilized in the medical decision making process    Radiology:    XR chest 2 views   Final Result      No acute cardiopulmonary disease  Workstation performed: [id number]             All radiology studies independently viewed by me and interpreted by the radiologist     Procedure    Procedures    CritCare Time      ED Course of Care and Re-Assessments        Medications   sodium chloride 0 9 % bolus 1,000 mL (0 mL Intravenous Stopped 1/18/19 1441)           FINAL IMPRESSION    Final diagnoses:   Weakness         DISPOSITION/PLAN    Vague generalized weakness as above  Vital signs reassuring, examination likewise reassuring  Screening lab sent returned reassuring  Low suspicion for toxic overdose, critical electrolyte abnormality, sepsis, meningitis, encephalitis, other acute life threat at this time  Symptoms resolved with 1 L normal saline IV  Discharged strict return precautions, follow up primary care doctor    Time reflects when diagnosis was documented in both MDM as applicable and the Disposition within this note     Time User Action Codes Description Comment    1/18/2019  3:07 PM Jamar Spear Add [R53 1] Weakness       ED Disposition ED Disposition Condition Comment    Discharge  Geovanny Torres discharge to home/self care  Condition at discharge: Good        Follow-up Information     Follow up With Specialties Details Why Contact Info    Beatriz Peterson DO Internal Medicine Go in 3 days As needed [address]  [phone]              PATIENT REFERRED TO:    Beatriz Peterson DO  [address] [address]  [phone]    Go in 3 days  As needed      DISCHARGE MEDICATIONS:    Discharge Medication List as of 1/18/2019  3:10 PM      CONTINUE these medications which have NOT CHANGED    Details   albuterol (PROVENTIL HFA,VENTOLIN HFA) 90 mcg/act inhaler Inhale 1 puff every 6 (six) hours as needed for wheezing, Historical Med      aspirin 81 mg chewable tablet Chew 81 mg daily, Historical Med      FLUoxetine (PROzac) 40 MG capsule Take 40 mg by mouth daily, Historical Med      hydrochlorothiazide (HYDRODIURIL) 25 mg tablet Take 25 mg by mouth daily, Historical Med      levothyroxine 50 mcg tablet Take 50 mcg by mouth daily, Historical Med      pantoprazole (PROTONIX) 40 mg tablet Take 40 mg by mouth daily, Historical Med      simvastatin (ZOCOR) 10 mg tablet Take 10 mg by mouth daily at bedtime, Historical Med             No discharge procedures on file           MD Jenni Ramos MD  01/18/19 1469

## 2019-01-18 NOTE — DISCHARGE INSTRUCTIONS
Weakness   WHAT YOU NEED TO KNOW:   Weakness is a loss of muscle strength  It may be caused by brain, nerve, or muscle problems  Physical and mental conditions such as heart problems, pregnancy, dehydration, or depression may also cause weakness  Reactions to certain drugs can cause weakness  Parts of your body may become weak if you need to wear a cast or splint or have been on bed rest for a long time  DISCHARGE INSTRUCTIONS:   Call 911 for any of the following:   · You have any of the following signs of a stroke:      ¨ Numbness or drooping on one side of your face     ¨ Weakness in an arm or leg    ¨ Confusion or difficulty speaking    ¨ Dizziness, a severe headache, or vision loss    · You lose feeling in your weakened body area  · You have electric shock-like feelings down your arms and legs when you flex or move your neck  · You have sudden or increased trouble speaking, swallowing, or breathing  Return to the emergency department if:   · You have severe pain in your back, arms, or legs that worsens  · You have sudden or worsened muscle weakness or loss of movement  · You are not able to control when you urinate or have a bowel movement  Contact your healthcare provider if:   · You feel depressed or anxious  · You have questions or concerns about your condition or care  Manage weakness:   · Use assistive devices as directed  These help protect you from injury  Examples include a walker or cane  Have someone install handrails in your home  These will help you get out of a bathtub or stand up from a toilet  Use a shower chair so you can sit while you shower  Sit down on the toilet or another chair to dry off and put on your clothes  Get help going up and down stairs if your legs are weak  · Go to physical or occupational therapy if directed  A physical therapist can teach you exercises to help strengthen weak muscles   An occupational therapist can show you ways to do your daily activities more easily  For example, light forks and spoons can be easier to use if you have hand weakness  You may also learn ways to organize your household items so you are not moving heavy items  · Balance rest with exercise  Exercise can help increase your muscle strength and energy  Do not exercise for long periods at a time  Take breaks often to rest  Too much exercise can cause muscle strain or make you more tired  Ask your healthcare provider how much exercise is right for you  · Eat a variety of healthy foods  Too much or too little food may cause weakness or tiredness  Ask your healthcare provider what a healthy amount of food is for you  Healthy foods include fruits, vegetables, whole-grain breads, low-fat dairy products, lean meats and fish, nuts, and cooked beans  · Do not smoke  Nicotine and other chemicals in cigarettes and cigars can make your symptoms worse, and can cause lung damage  Ask your healthcare provider for information if you currently smoke and need help to quit  E-cigarettes or smokeless tobacco still contain nicotine  Talk to your healthcare provider before you use these products  · Do not use caffeine, alcohol, or illegal drugs  These may cause muscle twitching, which could lead to worsened weakness  Follow up with your healthcare provider as directed:  Write down your questions so you remember to ask them during your visits  © 2017 [facility] Information is for End User's use only and may not be sold, redistributed or otherwise used for commercial purposes  All illustrations and images included in CareNotes® are the copyrighted property of [employer]  or [employer]  The above information is an  only  It is not intended as medical advice for individual conditions or treatments  Talk to your doctor, nurse or pharmacist before following any medical regimen to see if it is safe and effective for you

## 2019-02-05 ENCOUNTER — HOSPITAL ENCOUNTER (EMERGENCY)
Facility: HOSPITAL | Age: 79
Discharge: HOME | End: 2019-02-05
Attending: EMERGENCY MEDICINE | Admitting: EMERGENCY MEDICINE
Payer: MEDICARE

## 2019-02-05 ENCOUNTER — APPOINTMENT (EMERGENCY)
Dept: CT IMAGING | Facility: HOSPITAL | Age: 79
End: 2019-02-05
Payer: MEDICARE

## 2019-02-05 ENCOUNTER — APPOINTMENT (EMERGENCY)
Dept: RADIOLOGY | Facility: HOSPITAL | Age: 79
End: 2019-02-05
Payer: MEDICARE

## 2019-02-05 VITALS
DIASTOLIC BLOOD PRESSURE: 82 MMHG | WEIGHT: 203.2 LBS | SYSTOLIC BLOOD PRESSURE: 135 MMHG | BODY MASS INDEX: 37.39 KG/M2 | HEART RATE: 76 BPM | HEIGHT: 62 IN | RESPIRATION RATE: 16 BRPM | TEMPERATURE: 97.7 F | OXYGEN SATURATION: 96 %

## 2019-02-05 DIAGNOSIS — S69.92XA LEFT WRIST INJURY, INITIAL ENCOUNTER: Primary | ICD-10-CM
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
## 2019-02-05 DIAGNOSIS — S09.90XA HEAD INJURY: ICD-10-CM

## 2019-02-05 PROCEDURE — 72125 CT NECK SPINE W/O DYE: CPT

## 2019-02-05 PROCEDURE — 99284 EMERGENCY DEPT VISIT MOD MDM: CPT

## 2019-02-05 PROCEDURE — 73130 X-RAY EXAM OF HAND: CPT

## 2019-02-05 PROCEDURE — 70450 CT HEAD/BRAIN W/O DYE: CPT

## 2019-02-05 RX ORDER — TRAMADOL HYDROCHLORIDE 50 MG/1
50 TABLET ORAL EVERY 6 HOURS PRN
Qty: 15 TABLET | Refills: 0 | Status: SHIPPED | OUTPATIENT
Start: 2019-02-05 | End: 2019-02-15

## 2019-02-05 NOTE — DISCHARGE INSTRUCTIONS
Wrist Fracture in Adults   WHAT YOU NEED TO KNOW:   What is a wrist fracture? A wrist fracture is a break in one or more of the bones in your wrist  A wrist fracture may be caused by a fall, car accident, or sports injury  In older adults, a wrist fracture may be caused by weak bones  What are the signs and symptoms of a wrist fracture? · Pain, swelling, and bruising of your injured wrist    · Wrist pain that is worse when you hold something or put pressure on your wrist    · Weakness, numbness, or tingling in your injured hand or wrist    · Trouble moving your wrist, hand, or fingers    · A change in the shape of your wrist  How is a wrist fracture diagnosed? Your healthcare provider will examine you  You may need an x-ray, CT scan, or MRI  You may be given contrast liquid to help your wrist bones show up better in pictures  Tell the healthcare provider if you have ever had an allergic reaction to contrast liquid  Do not enter the MRI room with anything metal  Metal can cause serious injury  Tell the healthcare provider if you have any metal in or on your body  How is a wrist fracture treated? Treatment will depend on which wrist bone was broken and the kind of fracture you have  You may need any of the following:  · Medicine  may be given to decrease pain and swelling  You may need antibiotic medicine or a tetanus shot if there is a break in your skin  · A cast, splint, or brace  may be placed on your wrist to decrease movement  These devices will help hold the bones in place while they heal      · Traction  may be needed if your bone broke into 2 pieces  Traction pulls on the bone pieces to pull them back into place  A pin may be put in your bone or cast and hooked to ropes and a pulley  Weight is hung on the rope to help pull on the bones so they will heal correctly  · A closed reduction  is a procedure to put your bones into the correct position without surgery       · Surgery  may be needed to put your bones back into the correct position  Wires, pins, plates or screws may be used to help hold the bones in place  How can I manage my symptoms? · Rest  as much as possible  Do not play contact sports until the healthcare provider says it is okay  · Apply ice  on your wrist for 15 to 20 minutes every hour or as directed  Use an ice pack, or put crushed ice in a plastic bag  Cover it with a towel before you place it on your skin  Ice helps prevent tissue damage and decreases swelling and pain  · Elevate  your wrist above the level of your heart as often as possible  This will help decrease swelling and pain  Prop your wrist on pillows or blankets to keep it elevated comfortably  · Go to physical therapy  as directed  You may need physical therapy after your wrist heals and the cast is removed  A physical therapist can teach you exercises to help improve movement and strength and to decrease pain  When should I seek immediate care? · Your pain gets worse or does not get better after you take pain medicine  · Your cast or splint breaks, gets wet, or is damaged  · Your hand or fingers feel numb or cold  · Your hand or fingers turn white or blue  · Your splint or cast feels too tight  · You have more pain or swelling after the cast or splint is put on  When should I contact my healthcare provider? · You have a fever  · There is a foul smell or blood coming from under the cast     · You have questions or concerns about your condition or care  CARE AGREEMENT:   You have the right to help plan your care  Learn about your health condition and how it may be treated  Discuss treatment options with your caregivers to decide what care you want to receive  You always have the right to refuse treatment  The above information is an  only  It is not intended as medical advice for individual conditions or treatments   Talk to your doctor, nurse or pharmacist before following any medical regimen to see if it is safe and effective for you  © 2017 [address] Information is for End User's use only and may not be sold, redistributed or otherwise used for commercial purposes  All illustrations and images included in CareNotes® are the copyrighted property of [employer]  or [employer]  Concussion   WHAT YOU NEED TO KNOW:   What is a concussion? A concussion is a mild brain injury  It is usually caused by a bump or blow to the head from a fall, a motor vehicle crash, or a sports injury  Being shaken forcefully may also cause a concussion  What are the signs and symptoms of a concussion? Symptoms may occur right away, or they may appear days after the concussion  After the injury, you may have any of these symptoms:  · A mild to moderate headache    · Dizziness, loss of balance, or blurry vision    · Nausea or vomiting     · A change in mood, such as restlessness or irritability    · Trouble thinking, remembering things, or concentrating    · Ringing in the ears    · Drowsiness or decreased energy    · Changes in your normal sleeping pattern  How is a concussion diagnosed? Your healthcare provider will ask how you were injured, and about your symptoms  He will also examine you  You may need any of the following:  · A neurologic exam  is also called neuro signs, neuro checks, or neuro status  A neurologic exam can show healthcare providers how well your brain works after your injury  Healthcare providers will check how your pupils (black dots in the center of each eye) react to light  They may check your memory and how easily you wake up  Your hand grasp and balance may also be tested  · A CT, or MRI  of your head may be done  You may be given contrast liquid to help the pictures show up better  Tell the healthcare provider if you have ever had an allergic reaction to contrast liquid   Do not enter the MRI room with anything metal  Metal can cause serious injury  Tell the healthcare provider if you have any metal in or on your body  How is a concussion managed? Usually no treatment is needed for a mild concussion  Concussion symptoms usually go away within about 10 days  The following may be recommended to manage your symptoms:  · Rest  from physical and mental activities as directed  Mental activities are those that require thinking, concentration, and attention  You will need to rest until your symptoms are gone  Rest will allow you to recover from your concussion  Ask your healthcare provider when you can return to work and other daily activities  · Have someone stay with you for the first 24 hours after your injury  Your healthcare provider should be contacted if your symptoms get worse, or you develop new symptoms  · Do not participate in sports and physical activities until your healthcare provider says it is okay  They could make your symptoms worse or lead to another concussion  Your healthcare provider will tell you when it is okay for you to return to sports or physical activities  · Acetaminophen  may help to decrease headache pain  It is available without a doctor's order  Ask how much to take and how often to take it  Follow directions  Acetaminophen can cause liver damage if not taken correctly  · NSAIDs , such as ibuprofen, help decrease swelling and pain  NSAIDs can cause stomach bleeding or kidney problems in certain people  If you take blood thinner medicine, always ask your healthcare provider if NSAIDs are safe for you  Always read the medicine label and follow directions  How can I help prevent another concussion? · Wear protective sports equipment that fit properly  Helmets help decrease your risk of a serious brain injury  Talk to your healthcare provider about ways you can decrease your risk for a concussion if you play sports  · Wear your seat belt  every time you travel   This helps to decrease your risk for a head injury if you are in a car accident  Have someone else call 911 for the following:   · Someone tries to wake you and cannot do so  · You have a seizure, increasing confusion, or a change in personality  · Your speech becomes slurred, or you have new vision problems  When should I seek immediate care? · You have a severe headache that does not go away  · You have arm or leg weakness, numbness, or new problems with coordination  · You have blood or clear fluid coming out of the ears or nose  When should I contact my healthcare provider? · You have nausea or are vomiting  · You feel more sleepy than usual     · Your symptoms get worse  · Your symptoms last longer than 6 weeks after the injury  · You have questions or concerns about your condition or care  CARE AGREEMENT:   You have the right to help plan your care  Learn about your health condition and how it may be treated  Discuss treatment options with your caregivers to decide what care you want to receive  You always have the right to refuse treatment  The above information is an  only  It is not intended as medical advice for individual conditions or treatments  Talk to your doctor, nurse or pharmacist before following any medical regimen to see if it is safe and effective for you  © 2017 [facility] Information is for End User's use only and may not be sold, redistributed or otherwise used for commercial purposes  All illustrations and images included in CareNotes® are the copyrighted property of [employer]  or [employer]  Head Injury   WHAT YOU NEED TO KNOW:   A head injury is most often caused by a blow to the head  This may occur from a fall, bicycle injury, sports injury, being struck in the head, or a motor vehicle accident  DISCHARGE INSTRUCTIONS:   Call 911 or have someone else call for any of the following:   · You cannot be woken      · You have a seizure  · You stop responding to others or you faint  · You have blurry or double vision  · Your speech becomes slurred or confused  · You have arm or leg weakness, loss of feeling, or new problems with coordination  · Your pupils are larger than usual or one pupil is a different size than the other  · You have blood or clear fluid coming out of your ears or nose  Return to the emergency department if:   · You have repeated or forceful vomiting  · You feel confused  · Your headache gets worse or becomes severe  · You or someone caring for you notices that you are harder to wake than usual   Contact your healthcare provider if:   · Your symptoms last longer than 6 weeks after the injury  · You have questions or concerns about your condition or care  Medicines:   · Acetaminophen  decreases pain  Acetaminophen is available without a doctor's order  Ask how much to take and how often to take it  Follow directions  Acetaminophen can cause liver damage if not taken correctly  · Take your medicine as directed  Contact your healthcare provider if you think your medicine is not helping or if you have side effects  Tell him or her if you are allergic to any medicine  Keep a list of the medicines, vitamins, and herbs you take  Include the amounts, and when and why you take them  Bring the list or the pill bottles to follow-up visits  Carry your medicine list with you in case of an emergency  Self-care:   · Rest  or do quiet activities for 24 to 48 hours  Limit your time watching TV, using the computer, or doing tasks that require a lot of thinking  Slowly return to your normal activities as directed  Do not play sports or do activities that may cause you to get hit in the head  Ask your healthcare provider when you can return to sports  · Apply ice  on your head for 15 to 20 minutes every hour or as directed  Use an ice pack, or put crushed ice in a plastic bag   Cover it with a towel before you apply it to your skin  Ice helps prevent tissue damage and decreases swelling and pain  · Have someone stay with you for 24 hours  or as directed  This person can monitor you for complications and call [phone]  When you are awake the person should ask you a few questions to see if you are thinking clearly  An example would be to ask your name or your address  Prevent another head injury:   · Wear a helmet that fits properly  Do this when you play sports, or ride a bike, scooter, or skateboard  Helmets help decrease your risk of a serious head injury  Talk to your healthcare provider about other ways you can protect yourself if you play sports  · Wear your seat belt every time you are in a car  This helps to decrease your risk for a head injury if you are in a car accident  Follow up with your healthcare provider as directed:  Write down your questions so you remember to ask them during your visits  © 2017 [facility] Information is for End User's use only and may not be sold, redistributed or otherwise used for commercial purposes  All illustrations and images included in CareNotes® are the copyrighted property of [employer]  or [employer]  The above information is an  only  It is not intended as medical advice for individual conditions or treatments  Talk to your doctor, nurse or pharmacist before following any medical regimen to see if it is safe and effective for you

## 2019-02-05 NOTE — ED PROVIDER NOTES
History  Chief Complaint   Patient presents with    Fall     fell this morning on ice and hit her head, denies denies LOC, dizziness and vomiting  pt initially called EMS but decided to not get evaluated  pt also c/o left wrist pain and edema  pt was seen at urgent care and sent to ed for eval d/t asa use  52-year-old female presents for a fall (on daily 81mg aspirin) that occurred earlier today  This was a purely mechanical fall  Patient slipped on ice and fell while walking her dog  She syncopal symptoms  She had no loss of consciousness, no altered mental status  Patient initial called EMS, then refused EMS transport  Patient arrives to the emergency department later today for eval at request of family  Swelling to post scalp  Mild tenderness midline to cervical spine  Tenderness to the L wrist - a prior injury had occurred here  Neurovascularly intact distal to the injury  No weakness, numbness, GCS = 15  Prior to Admission Medications   Prescriptions Last Dose Informant Patient Reported? Taking?    FLUoxetine (PROzac) 40 MG capsule   Yes No   Sig: Take 40 mg by mouth daily   albuterol (PROVENTIL HFA,VENTOLIN HFA) 90 mcg/act inhaler   Yes No   Sig: Inhale 1 puff every 6 (six) hours as needed for wheezing   aspirin 81 mg chewable tablet   Yes No   Sig: Chew 81 mg daily   hydrochlorothiazide (HYDRODIURIL) 25 mg tablet   Yes No   Sig: Take 25 mg by mouth daily   levothyroxine 50 mcg tablet   Yes No   Sig: Take 50 mcg by mouth daily   pantoprazole (PROTONIX) 40 mg tablet   Yes No   Sig: Take 40 mg by mouth daily   simvastatin (ZOCOR) 10 mg tablet   Yes No   Sig: Take 10 mg by mouth daily at bedtime      Facility-Administered Medications: None       Past Medical History:   Diagnosis Date    Anxiety     COPD (chronic obstructive pulmonary disease) (HCC)     Depression     Disease of thyroid gland     Hypotension     Wrist fracture        Past Surgical History:   Procedure Laterality Date  BACK SURGERY  2017    INCONTINENCE SURGERY      NEPHRECTOMY TRANSPLANTED ORGAN      REPLACEMENT TOTAL KNEE BILATERAL         History reviewed  No pertinent family history  I have reviewed and agree with the history as documented  Social History   Substance Use Topics    Smoking status: Former Smoker    Smokeless tobacco: Never Used    Alcohol use No        Review of Systems   Constitutional: Negative for chills and fever  HENT: Negative for congestion and rhinorrhea  Respiratory: Negative for chest tightness and shortness of breath  Cardiovascular: Negative for chest pain and leg swelling  Gastrointestinal: Negative for abdominal pain, nausea and vomiting  Musculoskeletal: Positive for neck pain  Negative for back pain and neck stiffness  L wrist injury w swelling   Skin: Negative for color change and wound  Neurological: Positive for headaches  Negative for dizziness, weakness, light-headedness and numbness  Psychiatric/Behavioral: Negative for confusion  Physical Exam  Physical Exam   Constitutional: She is oriented to person, place, and time  She appears well-developed and well-nourished  No distress  HENT:   Head: Normocephalic  Right Ear: External ear normal    Left Ear: External ear normal    Mouth/Throat: Oropharynx is clear and moist    No hemotympanum   Soft tissue swelling to the occipital/post scalp   Eyes: Pupils are equal, round, and reactive to light  Conjunctivae and EOM are normal  Right eye exhibits no discharge  Left eye exhibits no discharge  Neck: Normal range of motion  Neck supple  No tracheal deviation present    + midline tenderness, no step offs   Cardiovascular: Normal rate, regular rhythm, normal heart sounds and intact distal pulses  Pulmonary/Chest: Effort normal and breath sounds normal  No stridor  No respiratory distress  She has no wheezes  She exhibits no tenderness  CTA-BL   Abdominal: Soft  She exhibits no distension   There is no tenderness  There is no guarding  Stable pelvis   Musculoskeletal: Normal range of motion  She exhibits tenderness (L wrist)  She exhibits no deformity  Back is non-tender, no step offs   Neurological: She is alert and oriented to person, place, and time  No cranial nerve deficit or sensory deficit  GCS = 15   Skin: Skin is warm and dry  She is not diaphoretic  No pallor  No abrasions, no lacerations, no contusions  Psychiatric: She has a normal mood and affect  Her behavior is normal    Vitals reviewed  Vital Signs  ED Triage Vitals [02/05/19 1620]   Temperature Pulse Respirations Blood Pressure SpO2   97 7 °F (36 5 °C) 88 16 142/79 94 %      Temp Source Heart Rate Source Patient Position - Orthostatic VS BP Location FiO2 (%)   Oral -- -- -- --      Pain Score       6           Vitals:    02/05/19 1620 02/05/19 1734 02/05/19 1737 02/05/19 1738   BP: 142/79 135/82 135/82    Pulse: 88  85 76       Visual Acuity  Visual Acuity      Most Recent Value   L Pupil Size (mm)  3   R Pupil Size (mm)  3          ED Medications  Medications - No data to display    Diagnostic Studies  Results Reviewed     None                 XR hand 3+ views LEFT   ED Interpretation by Iker Santos DO (02/05 1734)   Evidence of old injury  No acute fracture or dislocation noted on x-ray  Final Result by Kiarra Vincent DO (02/05 1809)      No acute osseous abnormality  Degenerative changes as above  Workstation performed: [id number]         CT spine cervical without contrast   ED Interpretation by Iker Santos DO (02/05 1734)   No cervical spine fracture or traumatic malalignment  Final Result by Will Austin MD (02/05 1732)      No cervical spine fracture or traumatic malalignment                     Workstation performed: [id number]         CT head without contrast   ED Interpretation by Iker Santos DO (02/05 1730)   No acute intracranial abnormality        Right posterior parietal vertex scalp swelling without underlying fracture  Final Result by Mari Nicole MD (02/05 1728)      No acute intracranial abnormality  Right posterior parietal vertex scalp swelling without underlying fracture  Workstation performed: [id number]                    Procedures  Orthopedic Injury  Date/Time: 2/5/2019 6:50 PM  Performed by: Cheryl Mora by: Diego Mcneal     Patient Location:  ED  Verbal consent obtained?: Yes    Risks and benefits: Risks, benefits and alternatives were discussed    Consent given by:  Patient  Patient states understanding of procedure being performed: Yes    Relevant documents present and verified: Yes    Radiology Images displayed and confirmed  If images not available, report reviewed: Yes    Patient identity confirmed:  Verbally with patient  Injury location:  Wrist  Location details:  Left wrist  Injury type: Soft tissue (Concern for possible left scaphoid fracture)  Neurovascular status: Neurovascularly intact    Distal perfusion: normal    Neurological function: normal    Range of motion: reduced    Skeletal traction used?: No    Immobilization:  Splint  Splint type:  Thumb spica  Supplies used:  Cotton padding, Ortho-Glass and elastic bandage  Neurovascular status: Neurovascularly intact    Distal perfusion: normal    Neurological function: normal    Range of motion: normal    Range of motion comment:  Splinted  Patient tolerance:  Patient tolerated the procedure well with no immediate complications           Phone Contacts  ED Phone Contact    ED Course           Identification of Seniors at Risk      Most Recent Value   (ISAR) Identification of Seniors at Risk   Before the illness or injury that brought you to the Emergency, did you need someone to help you on a regular basis?   0 Filed at: 02/05/2019 1624   In the last 24 hours, have you needed more help than usual?  0 Filed at: 02/05/2019 1624   Have you been hospitalized for one or more nights during the past 6 months? 0 Filed at: 02/05/2019 1624   In general, do you see well?  0 Filed at: 02/05/2019 1624   In general, do you have serious problems with your memory? 0 Filed at: 02/05/2019 1624   Do you take more than three different medications every day? 1 Filed at: 02/05/2019 1624   ISAR Score  1 Filed at: 02/05/2019 1624                          MDM  Number of Diagnoses or Management Options  Diagnosis management comments: Traumatic injury, taking aspirin  CT scan of head and neck are negative  X-ray of wrist - no acute change but will splint for soft tissue injury  Advised patient good return precautions in case of signs of worsening head injury or discoloration to the hand, neurovascular compromise  Advised follow-up with Hand surgery  Amount and/or Complexity of Data Reviewed  Tests in the radiology section of CPT®: ordered and reviewed        Disposition  Final diagnoses:   Left wrist injury, initial encounter   Head injury   Fall from slipping on ice     Time reflects when diagnosis was documented in both MDM as applicable and the Disposition within this note     Time User Action Codes Description Comment    2/5/2019  6:57 PM Alicia Oms Add [P06 90WM] Left wrist injury, initial encounter     2/5/2019  6:57 PM [address] [place] Genta L Add [S09 90XA] Head injury     2/5/2019  6:57 PM Coppersmith, Claudene Couch Add [W00  9XXA] Fall from slipping on ice       ED Disposition     ED Disposition Condition Date/Time Comment    Discharge  Tue Feb 5, 2019  6:57 PM [place] File discharge to home/self care      Condition at discharge: Good        Follow-up Information     Follow up With Specialties Details Why Contact Info Additional [address] [facility] Emergency Department Emergency Medicine  If symptoms worsen [address] [facility] [address]  [phone] [place] ED, [place], [address],  Internal Medicine Schedule an appointment as soon as possible for a visit For follow up to ensure improvement, and for further testing and treatment as needed [address] [facility] [address] [facility]       Jenniffer Javed MD Orthopedic Surgery, Orthopedics Schedule an appointment as soon as possible for a visit For follow up to ensure improvement, and for further testing and treatment as needed ( hand surgeon) [address] [facility]  [address]  [place] [facility] [address] [facility]             Discharge Medication List as of 2/5/2019  6:57 PM      START taking these medications    Details   traMADol (ULTRAM) 50 mg tablet Take 1 tablet (50 mg total) by mouth every 6 (six) hours as needed for moderate pain for up to 10 days, Starting Tue 2/5/2019, Until Fri 2/15/2019, Print         CONTINUE these medications which have NOT CHANGED    Details   albuterol (PROVENTIL HFA,VENTOLIN HFA) 90 mcg/act inhaler Inhale 1 puff every 6 (six) hours as needed for wheezing, Historical Med      aspirin 81 mg chewable tablet Chew 81 mg daily, Historical Med      FLUoxetine (PROzac) 40 MG capsule Take 40 mg by mouth daily, Historical Med      hydrochlorothiazide (HYDRODIURIL) 25 mg tablet Take 25 mg by mouth daily, Historical Med      levothyroxine 50 mcg tablet Take 50 mcg by mouth daily, Historical Med      pantoprazole (PROTONIX) 40 mg tablet Take 40 mg by mouth daily, Historical Med      simvastatin (ZOCOR) 10 mg tablet Take 10 mg by mouth daily at bedtime, Historical Med           No discharge procedures on file      ED Provider  Electronically Signed by           Sancho Malone DO  02/05/19 1931

## 2019-03-18 ENCOUNTER — APPOINTMENT (EMERGENCY)
Dept: RADIOLOGY | Facility: HOSPITAL | Age: 79
DRG: 202 | End: 2019-03-18
Payer: MEDICARE

## 2019-03-18 ENCOUNTER — HOSPITAL ENCOUNTER (INPATIENT)
Facility: HOSPITAL | Age: 79
LOS: 2 days | Discharge: HOME HEALTH | DRG: 202 | End: 2019-03-21
Attending: EMERGENCY MEDICINE | Admitting: INTERNAL MEDICINE
Payer: MEDICARE

## 2019-03-18 DIAGNOSIS — J18.9 RIGHT LOWER LOBE PNEUMONIA: ICD-10-CM

## 2019-03-18 DIAGNOSIS — E87.6 HYPOKALEMIA: ICD-10-CM

## 2019-03-18 DIAGNOSIS — R09.02 HYPOXIA: ICD-10-CM

## 2019-03-18 DIAGNOSIS — J40 TRACHEOBRONCHITIS: ICD-10-CM

## 2019-03-18 DIAGNOSIS — R53.1 WEAKNESS: ICD-10-CM

## 2019-03-18 DIAGNOSIS — J44.1 COPD EXACERBATION (HCC): Primary | ICD-10-CM

## 2019-03-18 LAB
ALBUMIN SERPL BCP-MCNC: 3.7 G/DL (ref 3.5–5)
ALP SERPL-CCNC: 92 U/L (ref 46–116)
ALT SERPL W P-5'-P-CCNC: 37 U/L (ref 12–78)
ANION GAP SERPL CALCULATED.3IONS-SCNC: 13 MMOL/L (ref 4–13)
AST SERPL W P-5'-P-CCNC: 35 U/L (ref 5–45)
BASOPHILS # BLD AUTO: 0.01 THOUSANDS/ΜL (ref 0–0.1)
BASOPHILS NFR BLD AUTO: 0 % (ref 0–1)
BILIRUB DIRECT SERPL-MCNC: 0.13 MG/DL (ref 0–0.2)
BILIRUB SERPL-MCNC: 0.5 MG/DL (ref 0.2–1)
BUN SERPL-MCNC: 20 MG/DL (ref 5–25)
CALCIUM SERPL-MCNC: 9.5 MG/DL (ref 8.3–10.1)
CHLORIDE SERPL-SCNC: 102 MMOL/L (ref 100–108)
CO2 SERPL-SCNC: 29 MMOL/L (ref 21–32)
CREAT SERPL-MCNC: 1.1 MG/DL (ref 0.6–1.3)
EOSINOPHIL # BLD AUTO: 0 THOUSAND/ΜL (ref 0–0.61)
EOSINOPHIL NFR BLD AUTO: 0 % (ref 0–6)
ERYTHROCYTE [DISTWIDTH] IN BLOOD BY AUTOMATED COUNT: 13.5 % (ref 11.6–15.1)
GFR SERPL CREATININE-BSD FRML MDRD: 49 ML/MIN/1.73SQ M
GLUCOSE SERPL-MCNC: 129 MG/DL (ref 65–140)
HCT VFR BLD AUTO: 40.2 % (ref 34.8–46.1)
HGB BLD-MCNC: 13.4 G/DL (ref 11.5–15.4)
IMM GRANULOCYTES # BLD AUTO: 0.06 THOUSAND/UL (ref 0–0.2)
IMM GRANULOCYTES NFR BLD AUTO: 1 % (ref 0–2)
INR PPP: 1.14 (ref 0.86–1.17)
LACTATE SERPL-SCNC: 3 MMOL/L (ref 0.5–2)
LYMPHOCYTES # BLD AUTO: 1.34 THOUSANDS/ΜL (ref 0.6–4.47)
LYMPHOCYTES NFR BLD AUTO: 11 % (ref 14–44)
MCH RBC QN AUTO: 29 PG (ref 26.8–34.3)
MCHC RBC AUTO-ENTMCNC: 33.3 G/DL (ref 31.4–37.4)
MCV RBC AUTO: 87 FL (ref 82–98)
MONOCYTES # BLD AUTO: 0.79 THOUSAND/ΜL (ref 0.17–1.22)
MONOCYTES NFR BLD AUTO: 7 % (ref 4–12)
NEUTROPHILS # BLD AUTO: 9.72 THOUSANDS/ΜL (ref 1.85–7.62)
NEUTS SEG NFR BLD AUTO: 81 % (ref 43–75)
NRBC BLD AUTO-RTO: 0 /100 WBCS
NT-PROBNP SERPL-MCNC: 743 PG/ML
PLATELET # BLD AUTO: 248 THOUSANDS/UL (ref 149–390)
PMV BLD AUTO: 9.7 FL (ref 8.9–12.7)
POTASSIUM SERPL-SCNC: 2.8 MMOL/L (ref 3.5–5.3)
PROT SERPL-MCNC: 7.1 G/DL (ref 6.4–8.2)
PROTHROMBIN TIME: 14.5 SECONDS (ref 11.8–14.2)
RBC # BLD AUTO: 4.62 MILLION/UL (ref 3.81–5.12)
SODIUM SERPL-SCNC: 144 MMOL/L (ref 136–145)
TROPONIN I SERPL-MCNC: <0.02 NG/ML
WBC # BLD AUTO: 11.92 THOUSAND/UL (ref 4.31–10.16)

## 2019-03-18 PROCEDURE — 80048 BASIC METABOLIC PNL TOTAL CA: CPT | Performed by: EMERGENCY MEDICINE

## 2019-03-18 PROCEDURE — 36415 COLL VENOUS BLD VENIPUNCTURE: CPT | Performed by: EMERGENCY MEDICINE

## 2019-03-18 PROCEDURE — 99285 EMERGENCY DEPT VISIT HI MDM: CPT

## 2019-03-18 PROCEDURE — 85610 PROTHROMBIN TIME: CPT | Performed by: EMERGENCY MEDICINE

## 2019-03-18 PROCEDURE — 96374 THER/PROPH/DIAG INJ IV PUSH: CPT

## 2019-03-18 PROCEDURE — 83605 ASSAY OF LACTIC ACID: CPT | Performed by: EMERGENCY MEDICINE

## 2019-03-18 PROCEDURE — 85025 COMPLETE CBC W/AUTO DIFF WBC: CPT | Performed by: EMERGENCY MEDICINE

## 2019-03-18 PROCEDURE — 94640 AIRWAY INHALATION TREATMENT: CPT

## 2019-03-18 PROCEDURE — 93005 ELECTROCARDIOGRAM TRACING: CPT

## 2019-03-18 PROCEDURE — 1123F ACP DISCUSS/DSCN MKR DOCD: CPT | Performed by: NURSE PRACTITIONER

## 2019-03-18 PROCEDURE — 71046 X-RAY EXAM CHEST 2 VIEWS: CPT

## 2019-03-18 PROCEDURE — 84484 ASSAY OF TROPONIN QUANT: CPT | Performed by: EMERGENCY MEDICINE

## 2019-03-18 PROCEDURE — 80076 HEPATIC FUNCTION PANEL: CPT | Performed by: EMERGENCY MEDICINE

## 2019-03-18 PROCEDURE — 83880 ASSAY OF NATRIURETIC PEPTIDE: CPT | Performed by: EMERGENCY MEDICINE

## 2019-03-18 RX ORDER — LORAZEPAM 0.5 MG/1
TABLET ORAL
COMMUNITY
Start: 2015-05-19

## 2019-03-18 RX ORDER — PREDNISONE 10 MG/1
TABLET ORAL
COMMUNITY
End: 2019-03-21 | Stop reason: HOSPADM

## 2019-03-18 RX ORDER — METHYLPREDNISOLONE SODIUM SUCCINATE 125 MG/2ML
125 INJECTION, POWDER, LYOPHILIZED, FOR SOLUTION INTRAMUSCULAR; INTRAVENOUS ONCE
Status: COMPLETED | OUTPATIENT
Start: 2019-03-18 | End: 2019-03-18

## 2019-03-18 RX ORDER — BUDESONIDE AND FORMOTEROL FUMARATE DIHYDRATE 160; 4.5 UG/1; UG/1
AEROSOL RESPIRATORY (INHALATION)
COMMUNITY

## 2019-03-18 RX ORDER — POTASSIUM CHLORIDE 20 MEQ/1
40 TABLET, EXTENDED RELEASE ORAL ONCE
Status: COMPLETED | OUTPATIENT
Start: 2019-03-19 | End: 2019-03-19

## 2019-03-18 RX ORDER — FLUOXETINE 20 MG/1
TABLET, FILM COATED ORAL
COMMUNITY
End: 2019-03-19

## 2019-03-18 RX ORDER — MOMETASONE FUROATE 50 UG/1
SPRAY, METERED NASAL DAILY
COMMUNITY
End: 2019-04-04 | Stop reason: CLARIF

## 2019-03-18 RX ORDER — ALBUTEROL SULFATE 0.63 MG/3ML
SOLUTION RESPIRATORY (INHALATION)
Status: ON HOLD | COMMUNITY
End: 2019-03-21 | Stop reason: SDUPTHER

## 2019-03-18 RX ORDER — ALBUTEROL SULFATE 0.63 MG/3ML
SOLUTION RESPIRATORY (INHALATION)
COMMUNITY
End: 2019-03-19

## 2019-03-18 RX ORDER — ALBUTEROL SULFATE 2.5 MG/3ML
5 SOLUTION RESPIRATORY (INHALATION) ONCE
Status: COMPLETED | OUTPATIENT
Start: 2019-03-18 | End: 2019-03-18

## 2019-03-18 RX ORDER — PREGABALIN 75 MG/1
CAPSULE ORAL
COMMUNITY
End: 2019-03-19

## 2019-03-18 RX ORDER — ALBUTEROL SULFATE 90 UG/1
AEROSOL, METERED RESPIRATORY (INHALATION)
COMMUNITY
End: 2019-03-19

## 2019-03-18 RX ADMIN — METHYLPREDNISOLONE SODIUM SUCCINATE 125 MG: 125 INJECTION, POWDER, FOR SOLUTION INTRAMUSCULAR; INTRAVENOUS at 23:12

## 2019-03-18 RX ADMIN — ALBUTEROL SULFATE 5 MG: 2.5 SOLUTION RESPIRATORY (INHALATION) at 22:53

## 2019-03-18 RX ADMIN — IPRATROPIUM BROMIDE 0.5 MG: 0.5 SOLUTION RESPIRATORY (INHALATION) at 22:52

## 2019-03-19 PROBLEM — E03.9 HYPOTHYROIDISM: Status: ACTIVE | Noted: 2019-03-19

## 2019-03-19 PROBLEM — E87.6 HYPOKALEMIA: Status: ACTIVE | Noted: 2019-03-19

## 2019-03-19 PROBLEM — R09.02 HYPOXIA: Status: ACTIVE | Noted: 2019-03-19

## 2019-03-19 LAB
ANION GAP SERPL CALCULATED.3IONS-SCNC: 13 MMOL/L (ref 4–13)
ATRIAL RATE: 92 BPM
BUN SERPL-MCNC: 15 MG/DL (ref 5–25)
CALCIUM SERPL-MCNC: 8.4 MG/DL (ref 8.3–10.1)
CHLORIDE SERPL-SCNC: 103 MMOL/L (ref 100–108)
CO2 SERPL-SCNC: 26 MMOL/L (ref 21–32)
CREAT SERPL-MCNC: 1.03 MG/DL (ref 0.6–1.3)
FLUAV AG SPEC QL: NOT DETECTED
FLUBV AG SPEC QL: NOT DETECTED
GFR SERPL CREATININE-BSD FRML MDRD: 53 ML/MIN/1.73SQ M
GLUCOSE SERPL-MCNC: 153 MG/DL (ref 65–140)
LACTATE SERPL-SCNC: 1.6 MMOL/L (ref 0.5–2)
LACTATE SERPL-SCNC: 2.5 MMOL/L (ref 0.5–2)
P AXIS: 59 DEGREES
POTASSIUM SERPL-SCNC: 3.4 MMOL/L (ref 3.5–5.3)
PR INTERVAL: 156 MS
QRS AXIS: 43 DEGREES
QRSD INTERVAL: 88 MS
QT INTERVAL: 376 MS
QTC INTERVAL: 464 MS
RSV B RNA SPEC QL NAA+PROBE: NOT DETECTED
SODIUM SERPL-SCNC: 142 MMOL/L (ref 136–145)
T WAVE AXIS: 42 DEGREES
VENTRICULAR RATE: 92 BPM

## 2019-03-19 PROCEDURE — 83605 ASSAY OF LACTIC ACID: CPT | Performed by: EMERGENCY MEDICINE

## 2019-03-19 PROCEDURE — G8979 MOBILITY GOAL STATUS: HCPCS

## 2019-03-19 PROCEDURE — 87631 RESP VIRUS 3-5 TARGETS: CPT | Performed by: INTERNAL MEDICINE

## 2019-03-19 PROCEDURE — 83605 ASSAY OF LACTIC ACID: CPT

## 2019-03-19 PROCEDURE — 99222 1ST HOSP IP/OBS MODERATE 55: CPT | Performed by: INTERNAL MEDICINE

## 2019-03-19 PROCEDURE — 94668 MNPJ CHEST WALL SBSQ: CPT

## 2019-03-19 PROCEDURE — 94760 N-INVAS EAR/PLS OXIMETRY 1: CPT

## 2019-03-19 PROCEDURE — 97166 OT EVAL MOD COMPLEX 45 MIN: CPT

## 2019-03-19 PROCEDURE — 94640 AIRWAY INHALATION TREATMENT: CPT

## 2019-03-19 PROCEDURE — 93010 ELECTROCARDIOGRAM REPORT: CPT | Performed by: INTERNAL MEDICINE

## 2019-03-19 PROCEDURE — 97163 PT EVAL HIGH COMPLEX 45 MIN: CPT

## 2019-03-19 PROCEDURE — 36415 COLL VENOUS BLD VENIPUNCTURE: CPT | Performed by: EMERGENCY MEDICINE

## 2019-03-19 PROCEDURE — G8988 SELF CARE GOAL STATUS: HCPCS

## 2019-03-19 PROCEDURE — G8987 SELF CARE CURRENT STATUS: HCPCS

## 2019-03-19 PROCEDURE — 80048 BASIC METABOLIC PNL TOTAL CA: CPT | Performed by: INTERNAL MEDICINE

## 2019-03-19 PROCEDURE — 84145 PROCALCITONIN (PCT): CPT | Performed by: PHYSICIAN ASSISTANT

## 2019-03-19 PROCEDURE — G8978 MOBILITY CURRENT STATUS: HCPCS

## 2019-03-19 RX ORDER — METHYLPREDNISOLONE SODIUM SUCCINATE 40 MG/ML
40 INJECTION, POWDER, LYOPHILIZED, FOR SOLUTION INTRAMUSCULAR; INTRAVENOUS ONCE
Status: COMPLETED | OUTPATIENT
Start: 2019-03-19 | End: 2019-03-19

## 2019-03-19 RX ORDER — HYDROCHLOROTHIAZIDE 25 MG/1
25 TABLET ORAL DAILY
Status: DISCONTINUED | OUTPATIENT
Start: 2019-03-19 | End: 2019-03-21 | Stop reason: HOSPADM

## 2019-03-19 RX ORDER — LORAZEPAM 0.5 MG/1
0.5 TABLET ORAL DAILY PRN
Status: DISCONTINUED | OUTPATIENT
Start: 2019-03-19 | End: 2019-03-21 | Stop reason: HOSPADM

## 2019-03-19 RX ORDER — ALBUTEROL SULFATE 90 UG/1
1 AEROSOL, METERED RESPIRATORY (INHALATION) EVERY 6 HOURS PRN
Status: DISCONTINUED | OUTPATIENT
Start: 2019-03-19 | End: 2019-03-19

## 2019-03-19 RX ORDER — BENZONATATE 100 MG/1
100 CAPSULE ORAL 3 TIMES DAILY PRN
COMMUNITY

## 2019-03-19 RX ORDER — PRAVASTATIN SODIUM 20 MG
20 TABLET ORAL
Status: DISCONTINUED | OUTPATIENT
Start: 2019-03-19 | End: 2019-03-21 | Stop reason: HOSPADM

## 2019-03-19 RX ORDER — BUDESONIDE AND FORMOTEROL FUMARATE DIHYDRATE 160; 4.5 UG/1; UG/1
2 AEROSOL RESPIRATORY (INHALATION) 2 TIMES DAILY
Status: DISCONTINUED | OUTPATIENT
Start: 2019-03-19 | End: 2019-03-21 | Stop reason: HOSPADM

## 2019-03-19 RX ORDER — FLUOXETINE HYDROCHLORIDE 20 MG/1
40 CAPSULE ORAL DAILY
Status: DISCONTINUED | OUTPATIENT
Start: 2019-03-19 | End: 2019-03-21 | Stop reason: HOSPADM

## 2019-03-19 RX ORDER — LEVOTHYROXINE SODIUM 0.05 MG/1
50 TABLET ORAL DAILY
Status: DISCONTINUED | OUTPATIENT
Start: 2019-03-19 | End: 2019-03-21 | Stop reason: HOSPADM

## 2019-03-19 RX ORDER — POTASSIUM CHLORIDE 20 MEQ/1
40 TABLET, EXTENDED RELEASE ORAL ONCE
Status: COMPLETED | OUTPATIENT
Start: 2019-03-19 | End: 2019-03-19

## 2019-03-19 RX ORDER — ECHINACEA PURPUREA EXTRACT 125 MG
1 TABLET ORAL
Status: DISCONTINUED | OUTPATIENT
Start: 2019-03-19 | End: 2019-03-21 | Stop reason: HOSPADM

## 2019-03-19 RX ORDER — PANTOPRAZOLE SODIUM 40 MG/1
40 TABLET, DELAYED RELEASE ORAL DAILY
Status: DISCONTINUED | OUTPATIENT
Start: 2019-03-19 | End: 2019-03-21 | Stop reason: HOSPADM

## 2019-03-19 RX ORDER — DOXYCYCLINE HYCLATE 100 MG/1
100 CAPSULE ORAL EVERY 12 HOURS SCHEDULED
Status: DISCONTINUED | OUTPATIENT
Start: 2019-03-19 | End: 2019-03-21 | Stop reason: HOSPADM

## 2019-03-19 RX ORDER — METHYLPREDNISOLONE SODIUM SUCCINATE 40 MG/ML
40 INJECTION, POWDER, LYOPHILIZED, FOR SOLUTION INTRAMUSCULAR; INTRAVENOUS EVERY 12 HOURS SCHEDULED
Status: DISCONTINUED | OUTPATIENT
Start: 2019-03-19 | End: 2019-03-21 | Stop reason: HOSPADM

## 2019-03-19 RX ORDER — ALBUTEROL SULFATE 90 UG/1
1 AEROSOL, METERED RESPIRATORY (INHALATION) EVERY 4 HOURS PRN
Status: DISCONTINUED | OUTPATIENT
Start: 2019-03-19 | End: 2019-03-20

## 2019-03-19 RX ORDER — LEVALBUTEROL 1.25 MG/.5ML
1.25 SOLUTION, CONCENTRATE RESPIRATORY (INHALATION)
Status: DISCONTINUED | OUTPATIENT
Start: 2019-03-19 | End: 2019-03-21 | Stop reason: HOSPADM

## 2019-03-19 RX ORDER — IPRATROPIUM BROMIDE AND ALBUTEROL SULFATE 2.5; .5 MG/3ML; MG/3ML
3 SOLUTION RESPIRATORY (INHALATION) EVERY 4 HOURS
Status: DISCONTINUED | OUTPATIENT
Start: 2019-03-19 | End: 2019-03-19

## 2019-03-19 RX ORDER — GUAIFENESIN 600 MG
600 TABLET, EXTENDED RELEASE 12 HR ORAL EVERY 12 HOURS SCHEDULED
Status: DISCONTINUED | OUTPATIENT
Start: 2019-03-19 | End: 2019-03-21 | Stop reason: HOSPADM

## 2019-03-19 RX ORDER — BENZONATATE 100 MG/1
100 CAPSULE ORAL 3 TIMES DAILY PRN
Status: DISCONTINUED | OUTPATIENT
Start: 2019-03-19 | End: 2019-03-20

## 2019-03-19 RX ADMIN — PRAVASTATIN SODIUM 20 MG: 20 TABLET ORAL at 17:42

## 2019-03-19 RX ADMIN — LEVOTHYROXINE SODIUM 50 MCG: 50 TABLET ORAL at 07:59

## 2019-03-19 RX ADMIN — IPRATROPIUM BROMIDE AND ALBUTEROL SULFATE 3 ML: 2.5; .5 SOLUTION RESPIRATORY (INHALATION) at 15:12

## 2019-03-19 RX ADMIN — IPRATROPIUM BROMIDE AND ALBUTEROL SULFATE 3 ML: 2.5; .5 SOLUTION RESPIRATORY (INHALATION) at 07:35

## 2019-03-19 RX ADMIN — BENZONATATE 100 MG: 100 CAPSULE ORAL at 07:35

## 2019-03-19 RX ADMIN — AZITHROMYCIN MONOHYDRATE 500 MG: 500 INJECTION, POWDER, LYOPHILIZED, FOR SOLUTION INTRAVENOUS at 00:27

## 2019-03-19 RX ADMIN — BUDESONIDE AND FORMOTEROL FUMARATE DIHYDRATE 2 PUFF: 160; 4.5 AEROSOL RESPIRATORY (INHALATION) at 17:42

## 2019-03-19 RX ADMIN — PANTOPRAZOLE SODIUM 40 MG: 40 TABLET, DELAYED RELEASE ORAL at 07:35

## 2019-03-19 RX ADMIN — POTASSIUM CHLORIDE 40 MEQ: 1500 TABLET, EXTENDED RELEASE ORAL at 00:09

## 2019-03-19 RX ADMIN — METHYLPREDNISOLONE SODIUM SUCCINATE 40 MG: 40 INJECTION, POWDER, FOR SOLUTION INTRAMUSCULAR; INTRAVENOUS at 23:57

## 2019-03-19 RX ADMIN — SODIUM CHLORIDE 500 ML: 0.9 INJECTION, SOLUTION INTRAVENOUS at 00:07

## 2019-03-19 RX ADMIN — CEFTRIAXONE SODIUM 1000 MG: 10 INJECTION, POWDER, FOR SOLUTION INTRAVENOUS at 00:11

## 2019-03-19 RX ADMIN — BENZONATATE 100 MG: 100 CAPSULE ORAL at 22:12

## 2019-03-19 RX ADMIN — METHYLPREDNISOLONE SODIUM SUCCINATE 40 MG: 40 INJECTION, POWDER, FOR SOLUTION INTRAMUSCULAR; INTRAVENOUS at 20:39

## 2019-03-19 RX ADMIN — SODIUM CHLORIDE 500 ML: 0.9 INJECTION, SOLUTION INTRAVENOUS at 03:24

## 2019-03-19 RX ADMIN — METHYLPREDNISOLONE SODIUM SUCCINATE 40 MG: 40 INJECTION, POWDER, FOR SOLUTION INTRAMUSCULAR; INTRAVENOUS at 09:09

## 2019-03-19 RX ADMIN — GUAIFENESIN 600 MG: 600 TABLET, EXTENDED RELEASE ORAL at 11:07

## 2019-03-19 RX ADMIN — LEVALBUTEROL HYDROCHLORIDE 1.25 MG: 1.25 SOLUTION, CONCENTRATE RESPIRATORY (INHALATION) at 21:32

## 2019-03-19 RX ADMIN — POTASSIUM CHLORIDE 40 MEQ: 1500 TABLET, EXTENDED RELEASE ORAL at 10:51

## 2019-03-19 RX ADMIN — GUAIFENESIN 600 MG: 600 TABLET, EXTENDED RELEASE ORAL at 20:44

## 2019-03-19 RX ADMIN — FLUOXETINE HYDROCHLORIDE 40 MG: 20 CAPSULE ORAL at 09:07

## 2019-03-19 RX ADMIN — DOXYCYCLINE HYCLATE 100 MG: 100 CAPSULE ORAL at 20:44

## 2019-03-19 RX ADMIN — DOXYCYCLINE HYCLATE 100 MG: 100 CAPSULE ORAL at 09:07

## 2019-03-19 RX ADMIN — IPRATROPIUM BROMIDE AND ALBUTEROL SULFATE 3 ML: 2.5; .5 SOLUTION RESPIRATORY (INHALATION) at 10:52

## 2019-03-19 RX ADMIN — BUDESONIDE AND FORMOTEROL FUMARATE DIHYDRATE 2 PUFF: 160; 4.5 AEROSOL RESPIRATORY (INHALATION) at 09:09

## 2019-03-19 RX ADMIN — IPRATROPIUM BROMIDE 0.5 MG: 0.5 SOLUTION RESPIRATORY (INHALATION) at 21:32

## 2019-03-19 RX ADMIN — HYDROCHLOROTHIAZIDE 25 MG: 25 TABLET ORAL at 09:09

## 2019-03-19 NOTE — H&P
H&P- [facility] 1940, 69 y o  female MRN: [id number]    Unit/Bed#: ED 18 Encounter: [id number]    Primary Care Provider: Waleska Ramirez MD   Date and time admitted to hospital: 3/18/2019 10:22 PM        * Hypoxia  Assessment & Plan  Patient presenting with productive cough and wheezing after a prolonged secondary smoke exposure over the weekend at a casino  She was 89% on room air when she arrived to the emergency department  She normally does not wear any oxygen  She was placed on 3 L of oxygen by nasal cannula in the ED  She was given Rocephin and azithromycin for possible community-acquired pneumonia  She was given Solu-Medrol in the emergency department  She was given nebulizers in the emergency department   -plan to admit patient for management of hypoxia likely from COPD exacerbation  Will institute Solu-Medrol 40 mg every 12 hours daily  DuoNebs ordered q 4 hours  Continue supplemental oxygen support  Continuous pulse ox ordered  Wean oxygen as tolerated  -Tessalon Pearls ordered for cough   -she does have a productive yellow cough, leukocytosis with a left shift which is neutrophil predominant  Will institute PO doxycycline for antimicrobial coverage  Viral panel ordered  VTE PROPHYLAXIS:  + SCDs    CODE STATUS: FULL    Anticipated Length of Stay:  Patient will be admitted on an Inpatient basis with an anticipated length of stay of  2 midnights  Justification for Hospital Stay:  COPD exacerbation      CHIEF COMPLAINT  cough, shortness of breath    HISTORY OF PRESENT ILLNESS  Cara Merritt is a 71-year-old female who just returned from [place]   Patient was on a cruise ship over the weekend  She reports that she was at a casino on the cruise ship which had a lot of smoke exposure  Since Friday night, she noticed a productive cough and shortness of breath which has been progressively worsening  Her cough is reported to be productive of yellow phlegm    Some chills reported  No nausea or vomiting  No headaches  No myalgias  No abdominal pain  No sick contacts  Patient reports that she was prescribed prednisone when she returned to [place]  Upon return to [place], she continued to have productive cough and ongoing shortness of breath prompting her to present to the emergency department here  In the ED, she received nebulization treatment and steroids  Her CBC showed leukocytosis with left shift which was neutrophil predominant  She was requiring 3 L of oxygen in the emergency department  She was reported to be 89% on room air  Medicine was consulted for concern of hypoxia secondary to COPD exacerbation  REVIEW OF SYSTEMS  A comprehensive 10 point review system conducted all negative except as mentioned in HPI       PMH/PSH    Past Medical History:   Diagnosis Date    Anxiety     COPD (chronic obstructive pulmonary disease) ([facility] [address] )     Depression     Disease of thyroid gland     Hypotension     Wrist fracture        Past Surgical History:   Procedure Laterality Date    BACK SURGERY  2017    INCONTINENCE SURGERY      NEPHRECTOMY TRANSPLANTED ORGAN      REPLACEMENT TOTAL KNEE BILATERAL         ALLERGIES  Allergies   Allergen Reactions    Fruit C [Ascorbate]      Reports allergy to fresh fruit       HOME MEDICATIONS  No current facility-administered medications on file prior to encounter        Current Outpatient Medications on File Prior to Encounter   Medication Sig    albuterol (PROVENTIL HFA,VENTOLIN HFA) 90 mcg/act inhaler Inhale 1 puff every 6 (six) hours as needed for wheezing    aspirin 81 mg chewable tablet Chew 81 mg daily    benzonatate (TESSALON PERLES) 100 mg capsule Take 100 mg by mouth 3 (three) times a day as needed for cough    budesonide-formoterol (SYMBICORT) 160-4 5 mcg/act inhaler Symbicort 160 mcg-4 5 mcg/actuation HFA aerosol inhaler    FLUoxetine (PROzac) 40 MG capsule Take 40 mg by mouth daily    hydrochlorothiazide (HYDRODIURIL) 25 mg tablet Take 25 mg by mouth daily    levothyroxine 50 mcg tablet Take 50 mcg by mouth daily    pantoprazole (PROTONIX) 40 mg tablet Take 40 mg by mouth daily    predniSONE 10 mg tablet prednisone 10 mg tablet    simvastatin (ZOCOR) 10 mg tablet Take 10 mg by mouth daily at bedtime    albuterol (ACCUNEB) 0 63 MG/3ML nebulizer solution as prescribed    B Complex-Folic Acid (B COMPLEX FORMULA 1) TABS Take by mouth    LORazepam (ATIVAN) 0 5 mg tablet lorazepam 0 5 mg tablet    mometasone (NASONEX) 50 mcg/act nasal spray Daily    Zoster Vaccine Live (ZOSTAVAX) 55395 UNT/0 65ML SUSR Zostavax (PF) 19,400 unit/0 65 mL subcutaneous suspension    [DISCONTINUED] albuterol (ACCUNEB) 0 63 MG/3ML nebulizer solution albuterol sulfate   as prescribed    [DISCONTINUED] albuterol (PROAIR HFA) 90 mcg/act inhaler ProAir HFA 90 mcg/actuation aerosol inhaler    [DISCONTINUED] budesonide (PULMICORT FLEXHALER) 180 MCG/ACT inhaler Pulmicort Flexhaler 180 mcg/actuation breath activated    [DISCONTINUED] FLUoxetine (PROzac) 20 MG tablet fluoxetine 20 mg capsule    [DISCONTINUED] Fluticasone Propionate, Inhal, (FLOVENT DISKUS) 100 MCG/BLIST AEPB Flovent Diskus 100 mcg/actuation powder for inhalation    [DISCONTINUED] pregabalin (LYRICA) 75 mg capsule Lyrica 75 mg capsule         SOCIAL HISTORY     Marital Status:     Substance Use History:   Social History     Substance and Sexual Activity   Alcohol Use No     Social History     Tobacco Use   Smoking Status Former Smoker   Smokeless Tobacco Never Used     Social History     Substance and Sexual Activity   Drug Use No       FAMILY HISTORY  Negative    OBJECTIVE    Vitals:   Blood Pressure: 121/88 (03/19/19 0215)  Pulse: 93 (03/19/19 0215)  Temperature: 98 °F (36 7 °C) (03/18/19 2237)  Temp Source: Oral (03/18/19 2237)  Respirations: 20 (03/19/19 0215)  Height: 5' 2" (157 5 cm) (03/19/19 0215)  Weight - Scale: 92 2 kg (203 lb 4 2 oz) (03/19/19 0215)  SpO2: 94 % (03/19/19 0215)    GENERAL: AAO x 3  NAD  HEENT: atraumatic, normocephalic  PERLAA  EOMI  NECK- supple, no JVD, no lymphadenopathy, no thryomegaly  CHEST:  Coarse diffusely with expiratory wheezing  CVS: S1, S2   Regular rate and rhythm, no murmurs, rub or gallops  ABDOMEN:  Soft, nontender, nondistended, normoactive bowel sounds  NEUROLOGICAL: CN II -XI grossly intact  No focal motor or sensory deficits  EXTREMITIES: No cyanosis/clubbing or edema  LAB DATA    Results Reviewed     Procedure Component Value Units Date/Time    Influenza A/B and RSV by PCR [[id number]]     Lab Status:  No result Specimen:  Nasopharyngeal Swab     Lactic acid, plasma [[id number]]  (Normal) Collected:  03/19/19 0309    Lab Status:  Final result Specimen:  Blood from Arm, Left Updated:  03/19/19 0336     LACTIC ACID 1 6 mmol/L     Narrative:       Result may be elevated if tourniquet was used during collection  Lactic acid, plasma [[id number]]  (Abnormal) Collected:  03/19/19 0113    Lab Status:  Final result Specimen:  Blood from Arm, Left Updated:  03/19/19 0141     LACTIC ACID 2 5 mmol/L     Narrative:       Result may be elevated if tourniquet was used during collection  Lactic acid, plasma [[id number]]  (Abnormal) Collected:  03/18/19 2310    Lab Status:  Final result Specimen:  Blood from Arm, Left Updated:  03/18/19 2354     LACTIC ACID 3 0 mmol/L     Narrative:       Result may be elevated if tourniquet was used during collection      Basic metabolic panel [[id number]]  (Abnormal) Collected:  03/18/19 2310    Lab Status:  Final result Specimen:  Blood from Arm, Left Updated:  03/18/19 2347     Sodium 144 mmol/L      Potassium 2 8 mmol/L      Chloride 102 mmol/L      CO2 29 mmol/L      ANION GAP 13 mmol/L      BUN 20 mg/dL      Creatinine 1 10 mg/dL      Glucose 129 mg/dL      Calcium 9 5 mg/dL      eGFR 49 ml/min/1 73sq m     Narrative:       National Kidney Disease Education Program recommendations are as follows:  GFR calculation is accurate only with a steady state creatinine  Chronic Kidney disease less than 60 ml/min/1 73 sq  meters  Kidney failure less than 15 ml/min/1 73 sq  meters      Hepatic function panel [[id number]]  (Normal) Collected:  03/18/19 2310    Lab Status:  Final result Specimen:  Blood from Arm, Left Updated:  03/18/19 2347     Total Bilirubin 0 50 mg/dL      Bilirubin, Direct 0 13 mg/dL      Alkaline Phosphatase 92 U/L      AST 35 U/L      ALT 37 U/L      Total Protein 7 1 g/dL      Albumin 3 7 g/dL     NT-BNP PRO (BNP for [place], [place], [place], [place], [place], [facility], [place], [place] campuses) [[id number]]  (Abnormal) Collected:  03/18/19 2310    Lab Status:  Final result Specimen:  Blood from Arm, Left Updated:  03/18/19 2347     NT-proBNP 743 pg/mL     Troponin I [[id number]]  (Normal) Collected:  03/18/19 2310    Lab Status:  Final result Specimen:  Blood from Arm, Left Updated:  03/18/19 2342     Troponin I <0 02 ng/mL     Protime-INR [[id number]]  (Abnormal) Collected:  03/18/19 2310    Lab Status:  Final result Specimen:  Blood from Arm, Left Updated:  03/18/19 2335     Protime 14 5 seconds      INR 1 14    CBC and differential [[id number]]  (Abnormal) Collected:  03/18/19 2310    Lab Status:  Final result Specimen:  Blood from Arm, Left Updated:  03/18/19 2323     WBC 11 92 Thousand/uL      RBC 4 62 Million/uL      Hemoglobin 13 4 g/dL      Hematocrit 40 2 %      MCV 87 fL      MCH 29 0 pg      MCHC 33 3 g/dL      RDW 13 5 %      MPV 9 7 fL      Platelets 004 Thousands/uL      nRBC 0 /100 WBCs      Neutrophils Relative 81 %      Immat GRANS % 1 %      Lymphocytes Relative 11 %      Monocytes Relative 7 %      Eosinophils Relative 0 %      Basophils Relative 0 %      Neutrophils Absolute 9 72 Thousands/µL      Immature Grans Absolute 0 06 Thousand/uL      Lymphocytes Absolute 1 34 Thousands/µL      Monocytes Absolute 0 79 Thousand/µL      Eosinophils Absolute 0 00 Thousand/µL      Basophils Absolute 0 01 Thousands/µL EKG, Pathology, and Other Studies Reviewed on Admission  Total time spent in the process of admission, completion of records, counseling, coordination of care, discussion with patient/family was approximately 35 minutes  Sariah Quezada MD  HOSPITALIST SERVICES  3/19/2019      PLEASE NOTE:  This encounter was completed utilizing the [employer] Direct Speech Voice Recognition Software  Grammatical errors, random word insertions, pronoun errors and incomplete sentences are occasional consequences of the system due to software limitations, ambient noise and hardware issues  These may be missed by proof reading prior to affixing electronic signature  Any questions or concerns about the content, text or information contained within the body of this dictation should be directly addressed to the physician for clarification  Please do not hesitate to call me directly if you have any any questions or concerns

## 2019-03-19 NOTE — SEPSIS NOTE
Sepsis Note   Carolina Ugalde Jovany 69 y o  female MRN: [id number]  Unit/Bed#: ED 18 Encounter: [id number]      qSOFA     [address] Name 03/18/19 2251 03/18/19 2237             Altered mental status GCS < 15           Respiratory Rate > / =22    0       Systolic BP < / =918  0         Q Sofa Score  0              patient presents with right lower lobe infiltrate consistent with pneumonia, lactate was 3  Lactate will be repeated in 3 hours  Patient is treated with broad-spectrum antibiotics Rocephin and Zithromax

## 2019-03-19 NOTE — PHYSICAL THERAPY NOTE
Physical Therapy Evaluation     Patient's Name: Khadijah Sanchez    Admitting Diagnosis  Cough [R05]    Problem List  Patient Active Problem List   Diagnosis    Left wrist injury, initial encounter    Hypoxia    [facility]ation [facility]    Hypothyroidism    Hypokalemia       Past Medical History  Past Medical History:   Diagnosis Date    Anxiety     COPD [facility]chronic obstructive pulmonary disease) ([facility]     Depression     Disease of thyroid gland     Hypotension     Wrist fracture        Past Surgical History  Past Surgical History:   Procedure Laterality Date    BACK SURGERY  2017    INCONTINENCE SURGERY      NEPHRECTOMY TRANSPLANTED ORGAN      REPLACEMENT TOTAL KNEE BILATERAL            03/19/19 1242   Note Type   Note type Eval only   Pain Assessment   Pain Assessment 0-10   Pain Score No Pain   Pain Type   ("rattling in chest" per pt)   Home Living   Type of [address] [place] [address] Two level;Performs ADLs on one level; Able to live on main level with bedroom/bathroom;Stairs to enter with rails  (1st floor set up, 5+3 VERONIQUE)   Bathroom Shower/Tub Tub/shower unit   Bathroom Toilet Raised   Bathroom Equipment Grab bars in shower   [facility]  ([address], doesn't use any AD at baseline)   Prior Function   Level of [place] Independent with ADLs and functional mobility   Lives With Alone   Receives Help From Neighbor;Friend(s)   ADL Assistance Independent   IADLs Independent   Falls in the last 6 months 1 to 4   Vocational Retired   Comments pt very active, attends [employer] every day  usually drives, hasn't driven since L wrist injury   Pt just returned from 8 day cruise   Restrictions/Precautions   Weight Bearing Precautions Per Order Yes  (maintained NWB L UE)   Braces or Orthoses Other (Comment)  (L wrist splint)   Other Precautions Droplet precautions;Multiple lines;Telemetry;O2;Fall Risk   General   Family/Caregiver Present No   Cognition   Overall Cognitive Status WFL   Arousal/Participation Alert   Orientation Level Oriented X4   Memory Within functional limits   Following Commands Follows all commands and directions without difficulty   Comments pt agreeable to PT evaluation   RUE Assessment   RUE Assessment   (defer to OT eval for comments)   LUE Assessment   LUE Assessment   (defer to OT eval for comments)   Bed Mobility   Supine to Sit 5  Supervision   Additional items Assist x 1;HOB elevated; Increased time required;Verbal cues   Sit to Supine 5  Supervision   Additional items Assist x 1;HOB elevated; Increased time required;Verbal cues   Additional Comments vitals post mobility: 96bpm, 97% SpO2 on 3L O2 NC, 142/67mmHg   Transfers   Sit to Stand 5  Supervision   Additional items Assist x 1; Increased time required;Verbal cues   Stand to Sit 5  Supervision   Additional items Assist x 1; Increased time required;Verbal cues   Additional Comments vc for breathing technique, PLB   Ambulation/Elevation   Gait pattern Decreased foot clearance; Short stride; Step to   Gait Assistance 5  Supervision   Additional items Assist x 1;Verbal cues   Assistive Device None   Distance 40' x2   Stair Management Assistance Not tested   Balance   Static Sitting Good   Dynamic Sitting Good   Static Standing Fair +   Dynamic Standing Fair +   Ambulatory Fair   Endurance Deficit   Endurance Deficit Yes   Activity Tolerance   Activity Tolerance Patient limited by fatigue   Medical Staff Made Aware OT [place]   Nurse Made Aware RN Antonette verbalized pt appropriate to see, made aware of session outcome/recs   Assessment   Prognosis Good   Problem List Decreased strength;Decreased endurance; Impaired balance;Decreased mobility   Assessment Pt is 69 y o  female seen for PT evaluation on 3/19/2019 s/p admit to [facility] on 3/18/2019 w/ Hypoxia  Pt presents with productive cough, SOB progressively worsening  R/o flu   Pt with return from 8 day cruise, notes she had a lot of smoke exposure while on cruiseship in [facility]  PT consulted to assess pt's functional mobility and d/c needs  Order placed for PT eval and tx, w/ up and OOB as tolerated order  Performed at least 2 patient identifiers during session: Name and wristband  Comorbidities affecting pt's physical performance at time of assessment include: anxiety, COPD, depression, disease of thyroid gland, hypotension, wrist fracture, h/o back surgery, h/o B TKR  PTA, pt was independent w/ all functional mobility w/ no AD/DME, ambulates unrestricted distances and all terrain, has 5+3 VERONIQUE and lives alone in 2 level home (1st floor set up)  Personal factors affecting pt at time of IE include: inaccessible home environment, stairs to enter home, inability to navigate community distances, limited home support and positive fall history  Please find objective findings from PT assessment regarding body systems outlined above with impairments and limitations including weakness, impaired balance, decreased endurance, gait deviations, decreased activity tolerance and SOB upon exertion, as well as mobility assessment (need for SBA assist w/ all phases of mobility when usually ambulating independently and need for cueing for mobility technique)  The following objective measures performed on IE also reveal limitations: Barthel Index: 55/100 and Modified [place]: 3 (moderate disability)  Pt's clinical presentation is currently unstable/unpredictable seen in pt's presentation of abnormal lab value(s), need for input for task focus and mobility technique and ongoing medical assessment  Pt to benefit from continued PT tx to address deficits as defined above and maximize level of functional independent mobility and consistency  From PT/mobility standpoint, recommendation at time of d/c would be Home PT pending progress in order to facilitate return to PLOF     Barriers to Discharge Inaccessible home environment;Decreased caregiver support  (pt lives alone, (+) VERONIQUE)   Goals Patient Goals to return home   STG Expiration Date 03/29/19   Short Term Goal #1 In 7-10 days: Increase bilateral LE strength 1/2 grade to facilitate independent mobility, Perform all bed mobility/log roll technique modified independent to decrease caregiver burden, Perform all transfers modified independent to improve independence, Ambulate > 150 ft  with least restrictive assistive device modified independent w/o LOB and w/ normalized gait pattern 100% of the time, Navigate 5 stairs modified independent without handrail to facilitate return to previous living environment, Increase all balance 1/2 grade to decrease risk for falls, Tolerate 4 hr OOB to faciliate upright tolerance, Improve Barthel Index score to 70 or greater to facilitate independence and PT provider will perform functional balance assessment to determine fall risk   Treatment Day 0   Plan   Treatment/Interventions Functional transfer training;LE strengthening/ROM; Elevations; Therapeutic exercise; Endurance training;Patient/family training;Equipment eval/education; Bed mobility;Gait training;Spoke to nursing;OT   PT Frequency   (3-5x/wk)   Recommendation   Recommendation Home PT   Equipment Recommended   (TBD, will require U/L AD if needed)   PT - OK to Discharge No   Additional Comments pt to clear stairs prior to safe d/c disposition home   Modified [place] Scale   Modified [place] Scale 3   Barthel Index   Feeding 10   Bathing 0   Grooming Score 5   Dressing Score 5   Bladder Score 10   Bowels Score 10   Toilet Use Score 5   Transfers (Bed/Chair) Score 10   Mobility (Level Surface) Score 0   Stairs Score 0   Barthel Index Score 55         Janelle Martin, PT, DPT

## 2019-03-19 NOTE — ASSESSMENT & PLAN NOTE
· Continue Solumedrol IV and Doxycycline  · Respiratory protocol/Nebulizers, airway clearance measures  Symbicort  · Pulmonary consult

## 2019-03-19 NOTE — OCCUPATIONAL THERAPY NOTE
Occupational Therapy Evaluation        Patient Name: Khadijah Sanchez  LHQLN'E Date: 3/19/2019         03/19/19 7881   Note Type   Note type Eval only   Restrictions/Precautions   Weight Bearing Precautions Per Order Yes   Braces or Orthoses Other (Comment)  (L wrist splint)   Other Precautions Droplet precautions; Impulsive   Pain Assessment   Pain Assessment No/denies pain   Pain Score No Pain   Home Living   Type of Home House   Home Layout Two level;Performs ADLs on one level; Able to live on main level with bedroom/bathroom;Stairs to enter with rails   Bathroom Shower/Tub Tub/shower unit   Bathroom Toilet Raised   Bathroom Equipment Grab bars in [address]  (not using at baseline)   Prior Function   Level of [place] Independent with ADLs and functional mobility   Lives With Alone   Receives Help From Neighbor;Friend(s)   ADL Assistance Independent   IADLs Independent   Falls in the last 6 months 1 to 4   Vocational Retired   Lifestyle   Autonomy Patient reporting indepependnet with ADLs/ IADLs, ambulatory with no AD, drives  Patient lives alone in a 2 story house, just returned from an 8 day cruise  Reciprocal Relationships Supportive family, friends   Psychosocial   Psychosocial (WDL) WDL   ADL   Eating Assistance 6  Modified independent   Grooming Assistance 5  Supervision/Setup   UB Bathing Assistance 5  Supervision/Setup   LB Bathing Assistance 4  Minimal Assistance   UB Dressing Assistance 4  Minimal Assistance   LB Dressing Assistance 4  Minimal Assistance   Toileting Assistance  5  Supervision/Setup   Functional Assistance 5  Supervision/Setup   Bed Mobility   Supine to Sit 5  Supervision   Additional items Assist x 1;HOB elevated; Increased time required;Verbal cues   Sit to Supine 5  Supervision   Additional items Assist x 1;HOB elevated; Increased time required;Verbal cues   Transfers   Sit to Stand 5  Supervision   Additional items Assist x 1; Increased time required;Verbal cues   Stand to Sit 5  Supervision   Additional items Assist x 1; Increased time required;Verbal cues   Functional Mobility   Functional Mobility 5  Supervision   Additional items   (no AD)   Balance   Static Sitting Good   Dynamic Sitting Good   Static Standing Fair +   Dynamic Standing Fair +   Activity Tolerance   Activity Tolerance Patient limited by fatigue   Nurse Made Aware ARIELLA Whitman verbalized pt appropriate to see, made aware of session outcome/recs   RUE Assessment   RUE Assessment WFL   LUE Assessment   LUE Assessment WFL   Hand Function   Gross Motor Coordination Functional   Fine Motor Coordination Functional   Sensation   Light Touch No apparent deficits   Vision-Basic Assessment   Current Vision Does not wear glasses   Cognition   Overall Cognitive Status WFL   Arousal/Participation Alert; Responsive; Cooperative   Attention Within functional limits   Orientation Level Oriented X4   Memory Within functional limits   Following Commands Follows all commands and directions without difficulty   AssessmentPatient is a 69 y o  female seen for OT evaluation s/p [facility]  on 3/18/2019 w/Hypoxia  Commorbidities affecting patient's functional performance at time of assessment include:anxiety, COPD, depression, disease of thyroid gland, hypotension, wrist fracture, h/o back surgery, h/o B TKR  Orders placed for OT evaluation and treatment  Performed at least two patient identifiers during session including name and wristband  Prior to admission, Patient reporting indepependnet with ADLs/ IADLs, ambulatory with no AD, drives  Patient lives alone in a 2 story house, just returned from an 8 day cruise  Personal factors affecting patient at time of initial evaluation include: limited caregiver support, difficulty performing ADLs and difficulty performing IADLs   Upon evaluation, patient requires supervision and set up assist for UB ADLs, minimal  assist for LB ADLs, transfers and functional ambulation in room and bathroom with supervision and set up assist, with close contact guard  Occupational performance is affected by the following deficits: impulsive behavior, degenerative arthritic joint changes and decreased activity tolerance  Therapist completed expanded review of medical records and additional review of physical, cognitive or psychosocial history, clinical examination identifying 3-5 performance deficits, clinical decision making of a moderate complexity, consistent with moderate complexity level evaluation  Patient to benefit from continued Occupational Therapy treatment while in the hospital to address deficits as defined above and maximize level of functional independence with ADLs and functional mobility  Occupational Performance areas to address include: health maintenance, IADLs: safety procedures, Leisure Participation and Social participation  From OT standpoint, recommendation at time of d/c would be [facility] and Home OT  Limitation Decreased ADL status; Decreased endurance;Decreased self-care trans;Decreased high-level ADLs   Prognosis Good   Goals   Patient Goals to return home   Plan   Treatment Interventions ADL retraining; Endurance training; Compensatory technique education;Continued evaluation;Cardiac education; Energy conservation; Activityengagement   Recommendation   OT Discharge Recommendation Home OT   Barthel Index   Feeding 10   Bathing 0   Grooming Score 5   Dressing Score 5   Bladder Score 10   Bowels Score 10   Toilet Use Score 5   Transfers (Bed/Chair) Score 10   Mobility (Level Surface) Score 0   Stairs Score 0   Barthel Index Score 55   Modified [place] Scale   Modified Daquan Scale 3       Occupational therapy Goals: In 2-4 days    1- Patient will verbalize and demonstrate use of energy conservation/ deep breathing technique and work simplification skills during functional activity with no verbal cues    2- Patient will verbalize and demonstrate good body mechanics and joint protection techniques during ADLs/ IADLs with no verbal cues   3- Patient will increase OOB/ sitting tolerance to 4-6 hours per day for increased participation in self care and leisure tasks with no s/s of exertion  4- Patient will identify s/s of exertion during ADL and functional mobility with no verbal cues    5-Patient will verbalize/ demonstrate compensatory strategies to recover from exertion with no verbal cues  6-Patient will increase standing tolerance time to 10 minutes with No UE support to complete sink level ADLs @ Mod I level   7- Patient will increase sitting tolerance at edge of bed to 30 minutes to complete UB ADLs @ Indep  level     8-- Patient/ Family will demonstrate competency with UE Home Exercise Program

## 2019-03-19 NOTE — RESPIRATORY THERAPY NOTE
RT Protocol Note  Mimi Cuellar 69 y o  female MRN: [id number]  Unit/Bed#: -01 Encounter: [id number]    Assessment    Principal Problem:    Hypoxia  Active Problems:    COPD exacerbation ([facility] )    Hypothyroidism    Hypokalemia      Home Pulmonary Medications:  Symbicort       Past Medical History:   Diagnosis Date    Anxiety     COPD [facility]ch[facility] disease) ([facility] )     Depression     Disease of thyroid gland     Hypotension     Wrist fracture      Social History     Socioeconomic History    Marital status:       Spouse name: None    Number of children: None    Years of education: None    Highest education level: None   Occupational History    None   Social Needs    Financial resource strain: None    Food insecurity:     Worry: None     Inability: None    Transportation needs:     Medical: None     Non-medical: None   Tobacco Use    Smoking status: Former Smoker    Smokeless tobacco: Never Used   Substance and Sexual Activity    Alcohol use: No    Drug use: No    Sexual activity: None   Lifestyle    Physical activity:     Days per week: None     Minutes per session: None    Stress: None   Relationships    Social connections:     Talks on phone: None     Gets together: None     Attends Latter day service: None     Active member of club or organization: None     Attends meetings of clubs or organizations: None     Relationship status: None    Intimate partner violence:     Fear of current or ex partner: None     Emotionally abused: None     Physically abused: None     Forced sexual activity: None   Other Topics Concern    None   Social History Narrative    None       Subjective         Objective    Physical Exam:   Assessment Type: Assess only  General Appearance: Awake, Alert  Respiratory Pattern: Normal  Chest Assessment: Chest expansion symmetrical  Bilateral Breath Sounds: Coarse, Expiratory wheezes  Cough: Strong, Non-productive    Vitals:  Blood pressure 110/57, pulse (!) 108, temperature 98 °F (36 7 °C), temperature source Oral, resp  rate 21, height 5' 2" (1 575 m), weight 92 2 kg (203 lb 4 2 oz), SpO2 94 %  Imaging and other studies: I have personally reviewed pertinent reports  Plan    Respiratory Plan: Mild Distress pathway  Airway Clearance Plan: Flutter     Resp Comments: Pt  is a former smoker that [place] inhaling second hand smoke a week ago  Pt  takes Symbicort at home for her respiratory treatments  Breath sounds are coarse with expiratory wheezes throughout  Pt  will benefit from the flutter device and states she will not refuse using  Pt  will beneifit from taking Xopenex/Atroven TID along with Pulmicort BID for current respiratory tx's

## 2019-03-19 NOTE — PLAN OF CARE
Problem: PHYSICAL THERAPY ADULT  Goal: Performs mobility at highest level of function for planned discharge setting  See evaluation for individualized goals  Description  Treatment/Interventions: Functional transfer training, LE strengthening/ROM, Elevations, Therapeutic exercise, Endurance training, Patient/family training, Equipment eval/education, Bed mobility, Gait training, Spoke to nursing, OT  Equipment Recommended: (TBD, will require U/L AD if needed)       See flowsheet documentation for full assessment, interventions and recommendations  Note:   Prognosis: Good  Problem List: Decreased strength, Decreased endurance, Impaired balance, Decreased mobility  Assessment: Pt is 69 y o  female seen for PT evaluation on 3/19/2019 s/p admit to [facility] on 3/18/2019 w/ Hypoxia  Pt presents with productive cough, SOB progressively worsening  R/o flu  Pt with return from 8 day cruise, notes she had a lot of smoke exposure while on cruiseship in [facility]  PT consulted to assess pt's functional mobility and d/c needs  Order placed for PT eval and tx, w/ up and OOB as tolerated order  Performed at least 2 patient identifiers during session: Name and wristband  Comorbidities affecting pt's physical performance at time of assessment include: anxiety, COPD, depression, disease of thyroid gland, hypotension, wrist fracture, h/o back surgery, h/o B TKR  PTA, pt was independent w/ all functional mobility w/ no AD/DME, ambulates unrestricted distances and all terrain, has 5+3 VERONIQUE and lives alone in 2 level home (1st floor set up)  Personal factors affecting pt at time of IE include: inaccessible home environment, stairs to enter home, inability to navigate community distances, limited home support and positive fall history   Please find objective findings from PT assessment regarding body systems outlined above with impairments and limitations including weakness, impaired balance, decreased endurance, gait deviations, decreased activity tolerance and SOB upon exertion, as well as mobility assessment (need for SBA assist w/ all phases of mobility when usually ambulating independently and need for cueing for mobility technique)  The following objective measures performed on IE also reveal limitations: Barthel Index: 55/100 and Modified [place]: 3 (moderate disability)  Pt's clinical presentation is currently unstable/unpredictable seen in pt's presentation of abnormal lab value(s), need for input for task focus and mobility technique and ongoing medical assessment  Pt to benefit from continued PT tx to address deficits as defined above and maximize level of functional independent mobility and consistency  From PT/mobility standpoint, recommendation at time of d/c would be Home PT pending progress in order to facilitate return to PLOF  Barriers to Discharge: Inaccessible home environment, Decreased caregiver support(pt lives alone, (+) VERONIQUE)     Recommendation: Home PT     PT - OK to Discharge: No    See flowsheet documentation for full assessment

## 2019-03-19 NOTE — SEPSIS NOTE
Sepsis Note   Nils Manzo 69 y o  female MRN: [id number]  Unit/Bed#: ED 18 Encounter: [id number]      qSOFA     [address] Name 03/18/19 2251 03/18/19 2237             Altered mental status GCS < 15           Respiratory Rate > / =22    0       Systolic BP < / =211  0         Q Sofa Score  0                      Default Flowsheet Data (last 720 hours)      Sepsis Reassess     Row Name 03/19/19 0154                   Repeat Volume Status and Tissue Perfusion Assessment Performed    Repeat Volume Status and Tissue Perfusion Assessment Performed  Yes  -PF           Volume Status and Tissue Perfusion Post Fluid Resuscitation * Must Document All *    Vital Signs Reviewed (HR, RR, BP, T)  Yes  -PF        Shock Index Reviewed  Yes  -PF        Arterial Oxygen Saturation Reviewed (POx, SaO2 or SpO2)          Cardio  Normal S1/S2  -PF        Pulmonary  Normal effort  -PF        Capillary Refill  Brisk  -PF        Peripheral Pulses  Radial  -PF        Peripheral Pulse  +4  -PF        Skin  Warm  -PF        Urine output assessed  Adequate  -PF           *OR*   Intensive Monitoring- Must Document One of the Following Four *:    Vital Signs Reviewed          * Central Venous Pressure (CVP or RAP)          * Central Venous Oxygen (SVO2, ScvO2 or Oxygen saturation via central catheter)          * Bedside Cardiovascular US in IVC diameter and % collapse          * Passive Leg Raise OR Crystalloid Challenge            User Key  (r) = Recorded By, (t) = Taken By, (c) = Cosigned By    Initials Name Provider Type    PF Radha Colon MD Physician          Patient was improved after IV antibiotics, lactate not greater than 4, no sign of hypertension no indication for volume or pressors

## 2019-03-19 NOTE — ASSESSMENT & PLAN NOTE
· Patient presented with productive cough and wheezing after a prolonged secondary smoke exposure over the weekend at a casino  She was 89% on room air when she arrived to the emergency department and was placed on 3 L of oxygen by nasal cannula in the ED  · She is admitted with a COPD exacerbation  · CXR showed minimal atelectasis right lung  · Solu-Medrol 40 mg every 12 hours daily and Doxycycline course  · Respiratory protocol and airway clearance measures  · Nebulizers ordered q 4 hours  Continue supplemental oxygen support  Continuous pulse ox ordered  Tessalon Perles  · Follow up Influenza/RSV panel  Follow up sputum culture  · Pulmonary consult

## 2019-03-19 NOTE — CONSULTS
Consultation - Pulmonary Medicine   Octavio Manzo 69 y o  female MRN: [id number]  Unit/Bed#: ED 18 Encounter: [id number]      Assessment:  1  Acute pulmonary insufficiency  2  Acute asthma/COPD exacerbation  3  Acute tracheobronchitis    Plan:   Patient with increased cough and shortness of breath after being exposed to significant amount of secondhand smoke [place] a casino about 1 week ago  Chest x-ray done on admission shows minimal discoid atelectasis in the right mid lung, she does have a mild leukocytosis along with a productive cough, influenza/RSV are negative, procalcitonin is pending  Would continue antibiotic treatment for acute tracheobronchitis  She is currently saturating 96% on 3 L nasal cannula, she does not use oxygen at baseline  Titrate to keep O2 sats greater than 89%  Continue Solu-Medrol q 12 hours, continue around the clock nebulizer treatments and Symbicort  Continue Mucinex, incentive spirometer  Discussed flutter valve with the patient but she does not want one  She had been following with Dr Robbie Russell at [facility], has not had a COPD exacerbation and a 3 years  Will need outpatient follow-up for PFTs, asthma/COPD management  History of Present Illness   Physician Requesting Consult: Stephanie Wyman MD  Reason for Consult / Principal Problem:  COPD exacerbation  Hx and PE limited by:  None  HPI: Josey Robledo is a 69y o  year old female former smoker with past medical history of asthma/COPD who presents with complaint of increasing shortness of breath and productive cough over past week  She was on a cruise and spent a lot of time in the casino which was full of a lot of smoke  She was seen in the urgent care while in [place] and was given an IM dose of Solu-Medrol  She returned here in continued to have productive cough with yellow mucus and shortness of breath not relieved with her nebulizer    She is currently feeling significantly improved since presentation though still does have significant cough  She has been following with Dr Cecilia Choe at [facility]  She has not had an exacerbation in about 3 years, had been doing well until this episode  Consults    Review of Systems   Constitutional: Negative  HENT: Positive for congestion  Respiratory: Positive for cough and shortness of breath  Cardiovascular: Negative  Gastrointestinal: Negative  Genitourinary: Negative  Musculoskeletal: Negative  Skin: Negative  Allergic/Immunologic: Negative  Neurological: Negative  Psychiatric/Behavioral: Negative          Historical Information   Past Medical History:   Diagnosis Date    Anxiety     COPD (chronic obstructive pulmonary disease) ([address] )     Depression     Disease of thyroid gland     Hypotension     Wrist fracture      Past Surgical History:   Procedure Laterality Date    BACK SURGERY  2017    INCONTINENCE SURGERY      NEPHRECTOMY TRANSPLANTED ORGAN      REPLACEMENT TOTAL KNEE BILATERAL       Social History   Social History     Substance and Sexual Activity   Alcohol Use No     Social History     Substance and Sexual Activity   Drug Use No     Social History     Tobacco Use   Smoking Status Former Smoker   Smokeless Tobacco Never Used     Occupational History:     Family History: non-contributory    Meds/Allergies   all current active meds have been reviewed, pertinent pulmonary meds have been reviewed and current meds:   Current Facility-Administered Medications   Medication Dose Route Frequency    albuterol (PROVENTIL HFA,VENTOLIN HFA) inhaler 1 puff  1 puff Inhalation Q6H PRN    benzonatate (TESSALON PERLES) capsule 100 mg  100 mg Oral TID PRN    budesonide-formoterol (SYMBICORT) 160-4 5 mcg/act inhaler 2 puff  2 puff Inhalation BID    doxycycline hyclate (VIBRAMYCIN) capsule 100 mg  100 mg Oral Q12H [address]    FLUoxetine (PROzac) capsule 40 mg  40 mg Oral Daily    guaiFENesin (MUCINEX) 12 hr tablet 600 mg  600 mg Oral Q12H [address]    hydrochlorothiazide (HYDRODIURIL) tablet 25 mg  25 mg Oral Daily    ipratropium-albuterol (DUO-NEB) 0 5-2 5 mg/3 mL inhalation solution 3 mL  3 mL Nebulization Q4H    levothyroxine tablet 50 mcg  50 mcg Oral Daily    LORazepam (ATIVAN) tablet 0 5 mg  0 5 mg Oral Daily PRN    methylPREDNISolone sodium succinate (Solu-MEDROL) injection 40 mg  40 mg Intravenous Q12H [address]    pantoprazole (PROTONIX) EC tablet 40 mg  40 mg Oral Daily    pravastatin (PRAVACHOL) tablet 20 mg  20 mg Oral Daily With Dinner    sodium chloride (OCEAN) 0 65 % nasal spray 1 spray  1 spray Each Nare Q1H PRN       Allergies   Allergen Reactions    Fruit C [Ascorbate]      Reports allergy to fresh fruit       Objective   Vitals: Blood pressure 110/57, pulse (!) 108, temperature 98 °F (36 7 °C), temperature source Oral, resp  rate 21, height 5' 2" (1 575 m), weight 92 2 kg (203 lb 4 2 oz), SpO2 94 %  ,Body mass index is 37 18 kg/m²  Intake/Output Summary (Last 24 hours) at 3/19/2019 1532  Last data filed at 3/19/2019 0500  Gross per 24 hour   Intake 2300 ml   Output    Net 2300 ml     Invasive Devices     Peripheral Intravenous Line            Peripheral IV 03/18/19 Left Antecubital less than 1 day                Physical Exam   Constitutional: She is oriented to person, place, and time  She appears well-developed and well-nourished  No distress  HENT:   Mouth/Throat: Oropharynx is clear and moist    Eyes: Pupils are equal, round, and reactive to light  Cardiovascular: Normal rate and regular rhythm  Pulmonary/Chest: Effort normal  No respiratory distress  She has no decreased breath sounds  She has wheezes (expiratory)  She has rhonchi (Bilateral)  Abdominal: Soft  There is no tenderness  Musculoskeletal: Normal range of motion  She exhibits no edema  Neurological: She is alert and oriented to person, place, and time  Skin: Skin is warm and dry  Psychiatric: She has a normal mood and affect         Lab Results:   I have personally reviewed pertinent lab results  , CBC:   Lab Results   Component Value Date    WBC 11 92 (H) 03/18/2019    HGB 13 4 03/18/2019    HCT 40 2 03/18/2019    MCV 87 03/18/2019     03/18/2019    MCH 29 0 03/18/2019    MCHC 33 3 03/18/2019    RDW 13 5 03/18/2019    MPV 9 7 03/18/2019    NRBC 0 03/18/2019   , CMP:   Lab Results   Component Value Date    SODIUM 142 03/19/2019    K 3 4 (L) 03/19/2019     03/19/2019    CO2 26 03/19/2019    BUN 15 03/19/2019    CREATININE 1 03 03/19/2019    CALCIUM 8 4 03/19/2019    AST 35 03/18/2019    ALT 37 03/18/2019    ALKPHOS 92 03/18/2019    EGFR 53 03/19/2019     Imaging Studies: I have personally reviewed pertinent reports  and I have personally reviewed pertinent films in PACS  EKG, Pathology, and Other Studies: I have personally reviewed pertinent reports      VTE Prophylaxis: Sequential compression device Helen Arias)     Code Status: Level 1 - Full Code  Advance Directive and Living Will: Yes    Power of :    POLST:

## 2019-03-19 NOTE — ASSESSMENT & PLAN NOTE
Patient presenting with productive cough and wheezing after a prolonged secondary smoke exposure [place] the [place] a [place]  She was 89% on room air when she arrived to the emergency department  She normally does not wear any oxygen  She was placed on 3 L of oxygen by nasal cannula in the ED  She was given Rocephin and azithromycin for possible community-acquired pneumonia  She was given Solu-Medrol in the emergency department  She was given nebulizers in the emergency department   -plan to admit patient for management of hypoxia likely from COPD exacerbation  Will institute Solu-Medrol 40 mg every 12 hours daily  DuoNebs ordered q 4 hours  Continue supplemental oxygen support  Continuous pulse ox ordered  Wean oxygen as tolerated  -Tessalon Pearls ordered for cough   -she does have a productive yellow cough, leukocytosis with a left shift which is neutrophil predominant  Will institute PO doxycycline for antimicrobial coverage  Viral panel ordered

## 2019-03-19 NOTE — ED NOTES
1  CC- Cough    2  Orientation status- A&O x4    3  Abnormal labs, vitals, focused assessment- Negative for the flu  Positive for Pneumonia  4  Medications/ drips- Duo-neb q 4 hours  5  Time of last Narcotics/ pain medication-N/A    6  IV lines/drain/etc - 20 L AC    7  Isolation status- Droplet    8  Skin- Intact    9  Ambulation status- Ambulates on own    10   ED RN phone number- Emerita Solis, RN  03/19/19 [id number]

## 2019-03-19 NOTE — ED PROVIDER NOTES
History  Chief Complaint   Patient presents with    Cough     persistant congested cough; on prednisone and cough suppressant     HPI patient is a 60-year-old female, history of COPD, presents emergency department with cough and congestion recently on a cruise and apparently spent several hours a day around people that were smoking in a casino  Then apparently was in [place] coughing and congested there  Apparently seen by a provider given prednisone and then at another visit given IM steroids  Patient reports some improvement but tonight was coughing and congested lives alone  Patient became increasingly short of breath  Patient required 3 treatments at home so she came to the hospital because she is afraid she is getting worse  She denies any chest pain  There is no loss of consciousness  Past medical history of COPD, asthma anxiety depression wrist fracture, uses a home nebulizer  Family history noncontributory  Social history, lives alone nonsmoker    Prior to Admission Medications   Prescriptions Last Dose Informant Patient Reported? Taking?    B Complex-Folic Acid (B COMPLEX FORMULA 1) TABS   Yes No   Sig: Take by mouth   FLUoxetine (PROzac) 20 MG tablet   Yes No   Sig: fluoxetine 20 mg capsule   FLUoxetine (PROzac) 40 MG capsule   Yes No   Sig: Take 40 mg by mouth daily   Fluticasone Propionate, Inhal, (FLOVENT DISKUS) 100 MCG/BLIST AEPB   Yes No   Sig: Flovent Diskus 100 mcg/actuation powder for inhalation   LORazepam (ATIVAN) 0 5 mg tablet   Yes Yes   Sig: lorazepam 0 5 mg tablet   Zoster Vaccine Live (ZOSTAVAX) 42548 UNT/0 65ML SUSR   Yes No   Sig: Zostavax (PF) 19,400 unit/0 65 mL subcutaneous suspension   albuterol (ACCUNEB) 0 63 MG/3ML nebulizer solution   Yes No   Sig: as prescribed   albuterol (ACCUNEB) 0 63 MG/3ML nebulizer solution   Yes No   Sig: albuterol sulfate   as prescribed   albuterol (PROAIR HFA) 90 mcg/act inhaler   Yes No   Sig: ProAir HFA 90 mcg/actuation aerosol inhaler albuterol (PROVENTIL HFA,VENTOLIN HFA) 90 mcg/act inhaler   Yes No   Sig: Inhale 1 puff every 6 (six) hours as needed for wheezing   aspirin 81 mg chewable tablet   Yes No   Sig: Chew 81 mg daily   budesonide (PULMICORT FLEXHALER) 180 MCG/ACT inhaler   Yes No   Sig: Pulmicort Flexhaler 180 mcg/actuation breath activated   budesonide-formoterol (SYMBICORT) 160-4 5 mcg/act inhaler   Yes No   Sig: Symbicort 160 mcg-4 5 mcg/actuation HFA aerosol inhaler   hydrochlorothiazide (HYDRODIURIL) 25 mg tablet   Yes No   Sig: Take 25 mg by mouth daily   levothyroxine 50 mcg tablet   Yes No   Sig: Take 50 mcg by mouth daily   mometasone (NASONEX) 50 mcg/act nasal spray   Yes No   Sig: Daily   pantoprazole (PROTONIX) 40 mg tablet   Yes No   Sig: Take 40 mg by mouth daily   predniSONE 10 mg tablet   Yes No   Sig: prednisone 10 mg tablet   pregabalin (LYRICA) 75 mg capsule   Yes No   Sig: Lyrica 75 mg capsule   simvastatin (ZOCOR) 10 mg tablet   Yes No   Sig: Take 10 mg by mouth daily at bedtime      Facility-Administered Medications: None       Past Medical History:   Diagnosis Date    Anxiety     COPD (chronic obstructive pulmonary disease) (HCC)     Depression     Disease of thyroid gland     Hypotension     Wrist fracture        Past Surgical History:   Procedure Laterality Date    BACK SURGERY  2017    INCONTINENCE SURGERY      NEPHRECTOMY TRANSPLANTED ORGAN      REPLACEMENT TOTAL KNEE BILATERAL         History reviewed  No pertinent family history  I have reviewed and agree with the history as documented  Social History     Tobacco Use    Smoking status: Former Smoker    Smokeless tobacco: Never Used   Substance Use Topics    Alcohol use: No    Drug use: No        Review of Systems   Constitutional: Negative for diaphoresis, fatigue and fever  HENT: Negative for congestion, ear pain, nosebleeds and sore throat  Eyes: Negative for photophobia, pain, discharge and visual disturbance     Respiratory: Positive for cough, shortness of breath and wheezing  Negative for choking, chest tightness and stridor  Cardiovascular: Negative for chest pain and palpitations  Gastrointestinal: Negative for abdominal distention, abdominal pain, diarrhea and vomiting  Genitourinary: Negative for dysuria, flank pain and frequency  Musculoskeletal: Negative for back pain, gait problem and joint swelling  Skin: Negative for color change and rash  Neurological: Negative for dizziness, syncope and headaches  Psychiatric/Behavioral: Negative for behavioral problems and confusion  The patient is not nervous/anxious  All other systems reviewed and are negative  Physical Exam  Physical Exam   Constitutional: She is oriented to person, place, and time  She appears well-developed and well-nourished  HENT:   Head: Normocephalic  Right Ear: External ear normal    Left Ear: External ear normal    Nose: Nose normal    Mouth/Throat: Oropharynx is clear and moist    Eyes: Pupils are equal, round, and reactive to light  EOM and lids are normal    Neck: Normal range of motion  Neck supple  Cardiovascular: Normal rate, regular rhythm and normal heart sounds  Pulmonary/Chest: Effort normal  No respiratory distress  She has wheezes  Abdominal: Soft  Musculoskeletal: Normal range of motion  She exhibits no deformity  Neurological: She is alert and oriented to person, place, and time  Skin: Skin is warm and dry  Psychiatric: She has a normal mood and affect  Nursing note and vitals reviewed     pulse oximetry initially 89% on room air by EMS, they report supplemental O2 brought the patient to 95% , consistent with initial hypoxia    Vital Signs  ED Triage Vitals   Temperature Pulse Respirations Blood Pressure SpO2   03/18/19 2237 03/18/19 2237 03/18/19 2237 03/18/19 2251 03/18/19 2237   98 °F (36 7 °C) 91 18 121/88 95 %      Temp Source Heart Rate Source Patient Position - Orthostatic VS BP Location FiO2 (%) 03/18/19 2237 03/18/19 2237 -- 03/18/19 2237 --   Oral Monitor  Right arm       Pain Score       03/18/19 2257       2           Vitals:    03/18/19 2237 03/18/19 2251   BP:  121/88   Pulse: 91        qSOFA     Row Name 03/18/19 2251 03/18/19 2237             Altered mental status GCS < 15           Respiratory Rate > / =22    0       Systolic BP < / =468  0         Q Sofa Score  0               Visual Acuity      ED Medications  Medications   sodium chloride 0 9 % bolus 500 mL (500 mL Intravenous New Bag 3/19/19 0007)   azithromycin (ZITHROMAX) 500 mg in sodium chloride 0 9% 250mL IVPB 500 mg (500 mg Intravenous New Bag 3/19/19 0027)   albuterol inhalation solution 5 mg (5 mg Nebulization Given 3/18/19 2253)   ipratropium (ATROVENT) 0 02 % inhalation solution 0 5 mg (0 5 mg Nebulization Given 3/18/19 2252)   methylPREDNISolone sodium succinate (Solu-MEDROL) injection 125 mg (125 mg Intravenous Given 3/18/19 2312)   ceftriaxone (ROCEPHIN) 1 g/50 mL in dextrose IVPB (0 mg Intravenous Stopped 3/19/19 0024)   potassium chloride (K-DUR,KLOR-CON) CR tablet 40 mEq (40 mEq Oral Given 3/19/19 0009)       Diagnostic Studies  Results Reviewed     Procedure Component Value Units Date/Time    Lactic acid, plasma [[id number]]     Lab Status:  No result Specimen:  Blood     Lactic acid, plasma [[id number]]  (Abnormal) Collected:  03/18/19 2310    Lab Status:  Final result Specimen:  Blood from Arm, Left Updated:  03/18/19 2354     LACTIC ACID 3 0 mmol/L     Narrative:       Result may be elevated if tourniquet was used during collection      Basic metabolic panel [[id number]]  (Abnormal) Collected:  03/18/19 2310    Lab Status:  Final result Specimen:  Blood from Arm, Left Updated:  03/18/19 2347     Sodium 144 mmol/L      Potassium 2 8 mmol/L      Chloride 102 mmol/L      CO2 29 mmol/L      ANION GAP 13 mmol/L      BUN 20 mg/dL      Creatinine 1 10 mg/dL      Glucose 129 mg/dL      Calcium 9 5 mg/dL      eGFR 49 ml/min/1 73sq m     Narrative:       National Kidney Disease Education Program recommendations are as follows:  GFR calculation is accurate only with a steady state creatinine  Chronic Kidney disease less than 60 ml/min/1 73 sq  meters  Kidney failure less than 15 ml/min/1 73 sq  meters      Hepatic function panel [[id number]]  (Normal) Collected:  03/18/19 2310    Lab Status:  Final result Specimen:  Blood from Arm, Left Updated:  03/18/19 2347     Total Bilirubin 0 50 mg/dL      Bilirubin, Direct 0 13 mg/dL      Alkaline Phosphatase 92 U/L      AST 35 U/L      ALT 37 U/L      Total Protein 7 1 g/dL      Albumin 3 7 g/dL     NT-BNP PRO (BNP for [place], [place], [place], [place], [place], [facility], [facility], [place] campuses) [[id number]]  (Abnormal) Collected:  03/18/19 2310    Lab Status:  Final result Specimen:  Blood from Arm, Left Updated:  03/18/19 2347     NT-proBNP 743 pg/mL     Troponin I [[id number]]  (Normal) Collected:  03/18/19 2310    Lab Status:  Final result Specimen:  Blood from Arm, Left Updated:  03/18/19 2342     Troponin I <0 02 ng/mL     Protime-INR [[id number]]  (Abnormal) Collected:  03/18/19 2310    Lab Status:  Final result Specimen:  Blood from Arm, Left Updated:  03/18/19 2335     Protime 14 5 seconds      INR 1 14    CBC and differential [[id number]]  (Abnormal) Collected:  03/18/19 2310    Lab Status:  Final result Specimen:  Blood from Arm, Left Updated:  03/18/19 2323     WBC 11 92 Thousand/uL      RBC 4 62 Million/uL      Hemoglobin 13 4 g/dL      Hematocrit 40 2 %      MCV 87 fL      MCH 29 0 pg      MCHC 33 3 g/dL      RDW 13 5 %      MPV 9 7 fL      Platelets 391 Thousands/uL      nRBC 0 /100 WBCs      Neutrophils Relative 81 %      Immat GRANS % 1 %      Lymphocytes Relative 11 %      Monocytes Relative 7 %      Eosinophils Relative 0 %      Basophils Relative 0 %      Neutrophils Absolute 9 72 Thousands/µL      Immature Grans Absolute 0 06 Thousand/uL      Lymphocytes Absolute 1 34 Thousands/µL      Monocytes Absolute 0 79 Thousand/µL Eosinophils Absolute 0 00 Thousand/µL      Basophils Absolute 0 01 Thousands/µL                  XR chest pa & lateral    (Results Pending)              Procedures  ECG 12 Lead Documentation  Date/Time: 3/18/2019 11:52 PM  Performed by: Nikolay Lake MD  Authorized by: Nikolay Lake MD     Indications / Diagnosis:  Shortness of breath  ECG reviewed by me, the ED Provider: yes    Patient location:  ED  Previous ECG:     Previous ECG:  Compared to current    Comparison ECG info:  January 18, 2019    Similarity:  No change  Interpretation:     Interpretation: non-specific    Rate:     ECG rate:  Ninety-two    ECG rate assessment: normal    Rhythm:     Rhythm: sinus rhythm    Ectopy:     Ectopy: none    QRS:     QRS axis:  Normal  ST segments:     ST segments:  Non-specific  Comments:      Normal sinus rhythm, nonspecific ST-T wave changes no acute ST elevations           Phone Contacts  ED Phone Contact    ED Course        diagnostic testing showed normal electrolytes other than a potassium at 2 8 consistent with hyperkalemia patient was repleted with oral potassium, liver functions were normal no sign of hepatitis  Patient's proBNP was  743, minimal elevation, not consistent with congestive heart failure  Cardiac troponin was normal no sign of cardiac ischemia, INR was normal no sign of coagulopathy  Patient's white count was elevated 11 9 consistent with inflammation, hemoglobin was normal at 13 4 no sign of anemia  Chest x-ray showed right lower lobe infiltrate consistent with pneumonia, interpreted by me I was initial , normal mediastinum, no sign of congestive heart failure no pneumothorax  repeat lactate was improved at 2 5    patient's 3rd lactate was normal at 1 6  No sign of septic shock  MDM  Medical decision making 81-year-old female, history of COPD, recently on a cruise exposed to smoke developed cough and congestion, treated with steroids without relief    Patient presents emergency department now after using albuterol  Nebulizer 3 times at home  Patient was alone and was concerned she was getting worse and increasing shortness of breath would not be able to care for herself she comes emergency department found by EMS to have hypoxia 89% on room air, patient improved with albuterol here  Chest x-ray was consistent with pneumonia  Patient will require supplemental oxygen, albuterol treatments, IV antibiotics  Discussed with the hospitalist service agreed to  Admission    Disposition  Final diagnoses:   COPD exacerbation ([facility] )   Right lower lobe pneumonia ([facility] )   Hypokalemia     Time reflects when diagnosis was documented in both MDM as applicable and the Disposition within this note     Time User Action Codes Description Comment    3/18/2019 11:57 PM Revere Vick Newman [J44 1] COPD exacerbation ([facility] )     3/18/2019 11:57 PM Revere Vick Newman [J18 1] Right lower lobe pneumonia ([facility] )     3/18/2019 11:58 PM Revere Vick Newman [E87 6] Hypokalemia       ED Disposition     ED Disposition Condition Date/Time Comment    Admit Stable Tue Mar 19, 2019 12:07 AM Case was discussed with  the hospitalist service and the patient's admission status was agreed to be 2 midnights the service of Dr Estrada   Follow-up Information    None         Patient's Medications   Discharge Prescriptions    No medications on file     No discharge procedures on file      ED Provider  Electronically Signed by           Melody Quan MD  03/19/19 0155       Melody Quan MD  03/19/19 4428       Melody Quan MD  03/19/19 2015

## 2019-03-19 NOTE — UTILIZATION REVIEW
Initial Clinical Review    Admission: Date/Time/Statement: 3/19/19 @ 0007   Orders Placed This Encounter   Procedures    Inpatient Admission     Standing Status:   Standing     Number of Occurrences:   1     Order Specific Question:   Admitting Physician     Answer:   Marques Butcher [[id number]]     Order Specific Question:   Level of Care     Answer:   Med Surg [[id number]]     Order Specific Question:   Estimated length of stay     Answer:   More than 2 Midnights     Order Specific Question:   Certification     Answer:   I certify that inpatient services are medically necessary for this patient for a duration of greater than two midnights  See H&P and MD Progress Notes for additional information about the patient's course of treatment  ED: Date/Time/Mode of Arrival:   ED Arrival Information     Expected Arrival Acuity Means of Arrival Escorted By Service Admission Type    - 3/18/2019 22:22 Urgent Ambulance [facility] Hospitalist Urgent    Arrival Complaint    cough        Chief Complaint:   Chief Complaint   Patient presents with    Cough     persistant congested cough; on prednisone and cough suppressant     Assessment/Plan: Marti Skiff is a 25-year-old female who just returned from [place]   Patient was on a cruise ship over the weekend  She reports that she was at a casino on the cruise ship which had a lot of smoke exposure  Since Friday night, she noticed a productive cough and shortness of breath which has been progressively worsening  Her cough is reported to be productive of yellow phlegm  Some chills reported  No nausea or vomiting  No headaches  No myalgias  No abdominal pain  No sick contacts  Patient reports that she was prescribed prednisone when she returned to [place]  Upon return to [place], she continued to have productive cough and ongoing shortness of breath prompting her to present to the emergency department here      * Hypoxia  Assessment & Plan  Patient presenting with productive cough and wheezing after a prolonged secondary smoke exposure over the weekend at a casino  She was 89% on room air when she arrived to the emergency department  She normally does not wear any oxygen  She was placed on 3 L of oxygen by nasal cannula in the ED  She was given Rocephin and azithromycin for possible community-acquired pneumonia  She was given Solu-Medrol in the emergency department  She was given nebulizers in the emergency department   -plan to admit patient for management of hypoxia likely from COPD exacerbation  Will institute Solu-Medrol 40 mg every 12 hours daily  DuoNebs ordered q 4 hours  Continue supplemental oxygen support  Continuous pulse ox ordered  Wean oxygen as tolerated  -Tessalon Pearls ordered for cough   -she does have a productive yellow cough, leukocytosis with a left shift which is neutrophil predominant  Will institute PO doxycycline for antimicrobial coverage  Viral panel ordered     Anticipated Length of Stay:  Patient will be admitted on an Inpatient basis with an anticipated length of stay of  2 midnights  Justification for Hospital Stay:  COPD exacerbation    ED Vital Signs:   ED Triage Vitals   Temperature Pulse Respirations Blood Pressure SpO2   03/18/19 2237 03/18/19 2237 03/18/19 2237 03/18/19 2251 03/18/19 2237   98 °F (36 7 °C) 91 18 121/88 95 %      Temp Source Heart Rate Source Patient Position - Orthostatic VS BP Location FiO2 (%)   03/18/19 2237 03/18/19 2237 03/19/19 0757 03/18/19 2237 --   Oral Monitor Lying Right arm       Pain Score       03/18/19 2257       2        Wt Readings from Last 1 Encounters:   03/19/19 92 2 kg (203 lb 4 2 oz)     Vital Signs (abnormal):   wnl   Pertinent Labs/Diagnostic Test Results:  Lactic acid   2 5  3 0, K   2 8  BNP   743 pt inr   14 5  1 14  Wbc  11 92  CXR - Minimal discoid atelectasis right midlung    ED Treatment:   Medication Administration from 03/18/2019 2222 to 03/19/2019 1355 Date/Time Order Dose Route Action Action by Comments     03/18/2019 2253 albuterol inhalation solution 5 mg 5 mg Nebulization Given Monet Moe RN      03/18/2019 2252 ipratropium (ATROVENT) 0 02 % inhalation solution 0 5 mg 0 5 mg Nebulization Given Monet Moe RN      03/18/2019 2312 methylPREDNISolone sodium succinate (Solu-MEDROL) injection 125 mg 125 mg Intravenous Given Monet Meo RN      03/19/2019 0142 sodium chloride 0 9 % bolus 500 mL 0 mL Intravenous Stopped Monet Moe RN      03/19/2019 0007 sodium chloride 0 9 % bolus 500 mL 500 mL Intravenous [address] Monet Moe RN      03/19/2019 0024 ceftriaxone (ROCEPHIN) 1 g/50 mL in dextrose IVPB 0 mg Intravenous Stopped Monet Moe RN      03/19/2019 0011 ceftriaxone (ROCEPHIN) 1 g/50 mL in dextrose IVPB 1,000 mg Intravenous [address] Monet Moe RN      03/19/2019 0113 azithromycin (ZITHROMAX) 500 mg in sodium chloride 0 9% 250mL IVPB 500 mg 0 mg Intravenous Stopped Monet Moe RN      03/19/2019 0027 azithromycin (ZITHROMAX) 500 mg in sodium chloride 0 9% 250mL IVPB 500 mg 500 mg Intravenous [address] Monet Moe RN      03/19/2019 0009 potassium chloride (K-DUR,KLOR-CON) CR tablet 40 mEq 40 mEq Oral Given Monet Moe RN      03/19/2019 0500 sodium chloride 0 9 % bolus 500 mL 0 mL Intravenous Stopped Monet Moe RN      03/19/2019 0324 sodium chloride 0 9 % bolus 500 mL 500 mL Intravenous [address] Monet Moe RN      03/19/2019 2270 benzonatate (TESSALON PERLES) capsule 100 mg 100 mg Oral Given Donya Hinton      03/19/2019 0909 budesonide-formoterol (SYMBICORT) 160-4 5 mcg/act inhaler 2 puff 2 puff Inhalation Given Donya Hinton      03/19/2019 0907 FLUoxetine (PROzac) capsule 40 mg 40 mg Oral Given Donya Hinton      03/19/2019 0909 hydrochlorothiazide (HYDRODIURIL) tablet 25 mg 25 mg Oral Given Donya Hinton      03/19/2019 0759 levothyroxine tablet 50 mcg 50 mcg Oral Given Donya [facility]      03/19/2019 0735 pantoprazole (PROTONIX) EC tablet 40 mg 40 mg Oral Given [facility]      03/19/2019 2574 methylPREDNISolone sodium succinate (Solu-MEDROL) injection 40 mg 40 mg Intravenous Given [facility]      03/19/2019 1052 ipratropium-albuterol (DUO-NEB) 0 5-2 5 mg/3 mL inhalation solution 3 mL 3 mL Nebulization Given [facility]      03/19/2019 0735 ipratropium-albuterol (DUO-NEB) 0 5-2 5 mg/3 mL inhalation solution 3 mL 3 mL Nebulization Given [facility]      03/19/2019 0907 doxycycline hyclate (VIBRAMYCIN) capsule 100 mg 100 mg Oral Given [facility]      03/19/2019 1051 potassium chloride (K-DUR,KLOR-CON) CR tablet 40 mEq 40 mEq Oral Given [facility]      03/19/2019 1107 guaiFENesin (MUCINEX) 12 hr tablet 600 mg 600 mg Oral Given [facility]         Past Medical/Surgical History:    Active Ambulatory Problems     Diagnosis Date Noted    Left wrist injury, initial encounter 02/05/2019    COPD exacerbation ([facility] [address] )      Past Medical History:   Diagnosis Date    Anxiety     COPD (chronic obstructive pulmonary disease) ([facility] [address] )     Depression     Disease of thyroid gland     Hypotension     Wrist fracture      Admitting Diagnosis: Cough [R05]  Age/Sex: 69 y o  female  Admission Orders:  Scheduled Meds:   Current Facility-Administered Medications:  albuterol 1 puff Inhalation Q6H PRN    benzonatate 100 mg Oral TID PRN    budesonide-formoterol 2 puff Inhalation BID    doxycycline hyclate 100 mg Oral Q12H [address]    FLUoxetine 40 mg Oral Daily    guaiFENesin 600 mg Oral Q12H AGA    hydrochlorothiazide 25 mg Oral Daily    ipratropium-albuterol 3 mL Nebulization Q4H    levothyroxine 50 mcg Oral Daily    LORazepam 0 5 mg Oral Daily PRN    methylPREDNISolone sodium succinate 40 mg Intravenous Q12H AGA    pantoprazole 40 mg Oral Daily    pravastatin 20 mg Oral Daily With Dinner    sodium chloride 1 spray Each Nare Q1H PRN      droplet isolation   pulm consult   OT PT eval   Up and OOB as brittany   BRP   SCD  Reg diet   3/19  Mg , bmp , bmp , flu , lactic acid 2 5  K   3 4 gluc  153

## 2019-03-19 NOTE — PROGRESS NOTES
Progress Note - Arianna Leaver 1940, 69 y o  female MRN: [id number]    Unit/Bed#: ED 18 Encounter: [id number]    Primary Care Provider: Latanya Thompson MD   Date and time admitted to hospital: 3/18/2019 10:22 PM    Post-admission note:    * Hypoxia  Assessment & Plan  · Patient presented with productive cough and wheezing after a prolonged secondary smoke exposure over the weekend at a casino  She was 89% on room air when she arrived to the emergency department and was placed on 3 L of oxygen by nasal cannula in the ED  · She is admitted with a COPD exacerbation  · CXR showed minimal atelectasis right lung  · Solu-Medrol 40 mg every 12 hours daily and Doxycycline course  · Respiratory protocol and airway clearance measures  · Nebulizers ordered q 4 hours  Continue supplemental oxygen support  Continuous pulse ox ordered  Tessalon Perles  · Follow up Influenza/RSV panel  Follow up sputum culture  · Pulmonary consult  COPD exacerbation (HCC)  Assessment & Plan  · Continue Solumedrol IV and Doxycycline  · Respiratory protocol/Nebulizers, airway clearance measures  Symbicort  · Pulmonary consult  Hypokalemia  Assessment & Plan  · Replace  · Recheck in am   Check Mg  Hypothyroidism  Assessment & Plan  · Continue Levothyroxine  S:  Patient reports increased SOB and cough since her recent cruise where she was exposed to a lot of smoke  Also with nasal congestion/PND  Not on oxygen at home but required supplemental oxygen on presentation  No CP     O: Gen: 70 y/o female in NAD  HEENT: NCAT, no icterus  Heart: S1S2, RRR  Lungs: Wheezes and rhonchi  Abdomen: Soft, NT, +BS  Ext: No clubbing or cyanosis  Neuro: AOx3

## 2019-03-19 NOTE — SEPSIS NOTE
Sepsis Note   Paola Manzo 69 y o  female MRN: [id number]  Unit/Bed#: ED 18 Encounter: [id number]      qSOFA     [address] Name 03/19/19 0215 03/18/19 2251 03/18/19 2237          Altered mental status GCS < 15            Respiratory Rate > / =22  0    0      Systolic BP < / =869  0  0        Q Sofa Score  0  0            Initial Sepsis Screening     Row Name 03/19/19 0518                Is the patient's history suggestive of a new or worsening infection? Yes (Proceed)  (Abnormal)   -PF        Suspected source of infection  pneumonia  -PF        Are two or more of the following signs & symptoms of infection both present and new to the patient? No  -PF        Indicate SIRS criteria          If the answer is yes to both questions, suspicion of sepsis is present          If severe sepsis is present AND tissue hypoperfusion perists in the hour after fluid resuscitation or lactate > 4, the patient meets criteria for SEPTIC SHOCK          Are any of the following organ dysfunction criteria present within 6 hours of suspected infection and SIRS criteria that are NOT considered to be chronic conditions?         Organ dysfunction          Date of presentation of severe sepsis          Time of presentation of severe sepsis          Tissue hypoperfusion persists in the hour after crystalloid fluid administration, evidenced, by either:          Was hypotension present within one hour of the conclusion of crystalloid fluid administration?           Date of presentation of septic shock          Time of presentation of septic shock            User Key  (r) = Recorded By, (t) = Taken By, (c) = Cosigned By    [address] Name Provider Type    PF Leeanna Ware MD Physician               Default Flowsheet Data (last 720 hours)      Sepsis Reassess     Row Name 03/19/19 0154                   Repeat Volume Status and Tissue Perfusion Assessment Performed    Repeat Volume Status and Tissue Perfusion Assessment Performed Yes  -PF           Volume Status and Tissue Perfusion Post Fluid Resuscitation * Must Document All *    Vital Signs Reviewed (HR, RR, BP, T)  Yes  -PF        Shock Index Reviewed  Yes  -PF        Arterial Oxygen Saturation Reviewed (POx, SaO2 or SpO2)          Cardio  Normal S1/S2  -PF        Pulmonary  Normal effort  -PF        Capillary Refill  Brisk  -PF        Peripheral Pulses  Radial  -PF        Peripheral Pulse  +4  -PF        Skin  Warm  -PF        Urine output assessed  Adequate  -PF           *OR*   Intensive Monitoring- Must Document One of the Following Four *:    Vital Signs Reviewed          * Central Venous Pressure (CVP or RAP)          * Central Venous Oxygen (SVO2, ScvO2 or Oxygen saturation via central catheter)          * Bedside Cardiovascular US in IVC diameter and % collapse          * Passive Leg Raise OR Crystalloid Challenge            User Key  (r) = Recorded By, (t) = Taken By, (c) = Cosigned By    Initials Name Provider Type    PF Kaelyn Hall MD Physician          Patient presents with pneumonia, initial lactate was elevated, improved with hydration and antibiotics  No sign of septic shock no indication for pressors or 30 cc/kilos

## 2019-03-20 LAB
ANION GAP SERPL CALCULATED.3IONS-SCNC: 8 MMOL/L (ref 4–13)
BASOPHILS # BLD AUTO: 0.01 THOUSANDS/ΜL (ref 0–0.1)
BASOPHILS NFR BLD AUTO: 0 % (ref 0–1)
BUN SERPL-MCNC: 15 MG/DL (ref 5–25)
CALCIUM SERPL-MCNC: 8.8 MG/DL (ref 8.3–10.1)
CHLORIDE SERPL-SCNC: 102 MMOL/L (ref 100–108)
CO2 SERPL-SCNC: 28 MMOL/L (ref 21–32)
CREAT SERPL-MCNC: 0.73 MG/DL (ref 0.6–1.3)
EOSINOPHIL # BLD AUTO: 0 THOUSAND/ΜL (ref 0–0.61)
EOSINOPHIL NFR BLD AUTO: 0 % (ref 0–6)
ERYTHROCYTE [DISTWIDTH] IN BLOOD BY AUTOMATED COUNT: 13.6 % (ref 11.6–15.1)
GFR SERPL CREATININE-BSD FRML MDRD: 80 ML/MIN/1.73SQ M
GLUCOSE SERPL-MCNC: 178 MG/DL (ref 65–140)
HCT VFR BLD AUTO: 37.4 % (ref 34.8–46.1)
HGB BLD-MCNC: 12.5 G/DL (ref 11.5–15.4)
IMM GRANULOCYTES # BLD AUTO: 0.04 THOUSAND/UL (ref 0–0.2)
IMM GRANULOCYTES NFR BLD AUTO: 1 % (ref 0–2)
LYMPHOCYTES # BLD AUTO: 0.48 THOUSANDS/ΜL (ref 0.6–4.47)
LYMPHOCYTES NFR BLD AUTO: 7 % (ref 14–44)
MAGNESIUM SERPL-MCNC: 1.7 MG/DL (ref 1.6–2.6)
MCH RBC QN AUTO: 29.3 PG (ref 26.8–34.3)
MCHC RBC AUTO-ENTMCNC: 33.4 G/DL (ref 31.4–37.4)
MCV RBC AUTO: 88 FL (ref 82–98)
MONOCYTES # BLD AUTO: 0.12 THOUSAND/ΜL (ref 0.17–1.22)
MONOCYTES NFR BLD AUTO: 2 % (ref 4–12)
NEUTROPHILS # BLD AUTO: 6.51 THOUSANDS/ΜL (ref 1.85–7.62)
NEUTS SEG NFR BLD AUTO: 90 % (ref 43–75)
NRBC BLD AUTO-RTO: 0 /100 WBCS
PLATELET # BLD AUTO: 190 THOUSANDS/UL (ref 149–390)
PMV BLD AUTO: 9.4 FL (ref 8.9–12.7)
POTASSIUM SERPL-SCNC: 3.3 MMOL/L (ref 3.5–5.3)
PROCALCITONIN SERPL-MCNC: <0.05 NG/ML
RBC # BLD AUTO: 4.27 MILLION/UL (ref 3.81–5.12)
SODIUM SERPL-SCNC: 138 MMOL/L (ref 136–145)
WBC # BLD AUTO: 7.16 THOUSAND/UL (ref 4.31–10.16)

## 2019-03-20 PROCEDURE — 80048 BASIC METABOLIC PNL TOTAL CA: CPT | Performed by: PHYSICIAN ASSISTANT

## 2019-03-20 PROCEDURE — 94640 AIRWAY INHALATION TREATMENT: CPT

## 2019-03-20 PROCEDURE — 94760 N-INVAS EAR/PLS OXIMETRY 1: CPT

## 2019-03-20 PROCEDURE — 94668 MNPJ CHEST WALL SBSQ: CPT

## 2019-03-20 PROCEDURE — 83735 ASSAY OF MAGNESIUM: CPT | Performed by: PHYSICIAN ASSISTANT

## 2019-03-20 PROCEDURE — 99232 SBSQ HOSP IP/OBS MODERATE 35: CPT | Performed by: PHYSICIAN ASSISTANT

## 2019-03-20 PROCEDURE — 85025 COMPLETE CBC W/AUTO DIFF WBC: CPT | Performed by: PHYSICIAN ASSISTANT

## 2019-03-20 RX ORDER — ALBUTEROL SULFATE 2.5 MG/3ML
2.5 SOLUTION RESPIRATORY (INHALATION) EVERY 6 HOURS PRN
Status: DISCONTINUED | OUTPATIENT
Start: 2019-03-20 | End: 2019-03-20

## 2019-03-20 RX ORDER — ALBUTEROL SULFATE 2.5 MG/3ML
2.5 SOLUTION RESPIRATORY (INHALATION) EVERY 4 HOURS PRN
Status: DISCONTINUED | OUTPATIENT
Start: 2019-03-20 | End: 2019-03-21 | Stop reason: HOSPADM

## 2019-03-20 RX ORDER — POTASSIUM CHLORIDE 20 MEQ/1
40 TABLET, EXTENDED RELEASE ORAL ONCE
Status: COMPLETED | OUTPATIENT
Start: 2019-03-20 | End: 2019-03-20

## 2019-03-20 RX ORDER — BENZONATATE 100 MG/1
200 CAPSULE ORAL 3 TIMES DAILY
Status: DISCONTINUED | OUTPATIENT
Start: 2019-03-20 | End: 2019-03-20

## 2019-03-20 RX ORDER — BENZONATATE 100 MG/1
200 CAPSULE ORAL 3 TIMES DAILY
Status: DISCONTINUED | OUTPATIENT
Start: 2019-03-20 | End: 2019-03-21 | Stop reason: HOSPADM

## 2019-03-20 RX ADMIN — LEVOTHYROXINE SODIUM 50 MCG: 50 TABLET ORAL at 05:18

## 2019-03-20 RX ADMIN — IPRATROPIUM BROMIDE 0.5 MG: 0.5 SOLUTION RESPIRATORY (INHALATION) at 13:00

## 2019-03-20 RX ADMIN — BENZONATATE 200 MG: 100 CAPSULE ORAL at 12:27

## 2019-03-20 RX ADMIN — GUAIFENESIN 600 MG: 600 TABLET, EXTENDED RELEASE ORAL at 09:07

## 2019-03-20 RX ADMIN — BENZONATATE 200 MG: 100 CAPSULE ORAL at 21:07

## 2019-03-20 RX ADMIN — BUDESONIDE AND FORMOTEROL FUMARATE DIHYDRATE 2 PUFF: 160; 4.5 AEROSOL RESPIRATORY (INHALATION) at 18:43

## 2019-03-20 RX ADMIN — BENZONATATE 200 MG: 100 CAPSULE ORAL at 16:28

## 2019-03-20 RX ADMIN — PRAVASTATIN SODIUM 20 MG: 20 TABLET ORAL at 16:28

## 2019-03-20 RX ADMIN — DOXYCYCLINE HYCLATE 100 MG: 100 CAPSULE ORAL at 09:07

## 2019-03-20 RX ADMIN — GUAIFENESIN 600 MG: 600 TABLET, EXTENDED RELEASE ORAL at 21:07

## 2019-03-20 RX ADMIN — HYDROCHLOROTHIAZIDE 25 MG: 25 TABLET ORAL at 09:07

## 2019-03-20 RX ADMIN — POTASSIUM CHLORIDE 40 MEQ: 1500 TABLET, EXTENDED RELEASE ORAL at 09:03

## 2019-03-20 RX ADMIN — METHYLPREDNISOLONE SODIUM SUCCINATE 40 MG: 40 INJECTION, POWDER, FOR SOLUTION INTRAMUSCULAR; INTRAVENOUS at 09:07

## 2019-03-20 RX ADMIN — LEVALBUTEROL HYDROCHLORIDE 1.25 MG: 1.25 SOLUTION, CONCENTRATE RESPIRATORY (INHALATION) at 13:00

## 2019-03-20 RX ADMIN — IPRATROPIUM BROMIDE 0.5 MG: 0.5 SOLUTION RESPIRATORY (INHALATION) at 20:30

## 2019-03-20 RX ADMIN — BENZONATATE 100 MG: 100 CAPSULE ORAL at 09:06

## 2019-03-20 RX ADMIN — DOXYCYCLINE HYCLATE 100 MG: 100 CAPSULE ORAL at 21:07

## 2019-03-20 RX ADMIN — LEVALBUTEROL HYDROCHLORIDE 1.25 MG: 1.25 SOLUTION, CONCENTRATE RESPIRATORY (INHALATION) at 08:37

## 2019-03-20 RX ADMIN — BUDESONIDE AND FORMOTEROL FUMARATE DIHYDRATE 2 PUFF: 160; 4.5 AEROSOL RESPIRATORY (INHALATION) at 09:06

## 2019-03-20 RX ADMIN — ENOXAPARIN SODIUM 40 MG: 40 INJECTION SUBCUTANEOUS at 09:07

## 2019-03-20 RX ADMIN — LEVALBUTEROL HYDROCHLORIDE 1.25 MG: 1.25 SOLUTION, CONCENTRATE RESPIRATORY (INHALATION) at 20:30

## 2019-03-20 RX ADMIN — METHYLPREDNISOLONE SODIUM SUCCINATE 40 MG: 40 INJECTION, POWDER, FOR SOLUTION INTRAMUSCULAR; INTRAVENOUS at 21:07

## 2019-03-20 RX ADMIN — PANTOPRAZOLE SODIUM 40 MG: 40 TABLET, DELAYED RELEASE ORAL at 05:18

## 2019-03-20 RX ADMIN — IPRATROPIUM BROMIDE 0.5 MG: 0.5 SOLUTION RESPIRATORY (INHALATION) at 08:37

## 2019-03-20 RX ADMIN — FLUOXETINE HYDROCHLORIDE 40 MG: 20 CAPSULE ORAL at 09:07
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
## 2019-03-20 NOTE — ASSESSMENT & PLAN NOTE
· Continue Solumedrol IV for COPD exacerbation and Doxycycline course (needs 7 days total) for acute tracheobronchitis  · Respiratory protocol/Nebulizers, airway clearance measures  Symbicort  · Pulmonary following  Input appreciated

## 2019-03-21 VITALS
HEIGHT: 62 IN | BODY MASS INDEX: 37.61 KG/M2 | WEIGHT: 204.37 LBS | OXYGEN SATURATION: 93 % | TEMPERATURE: 98.2 F | RESPIRATION RATE: 20 BRPM | DIASTOLIC BLOOD PRESSURE: 78 MMHG | SYSTOLIC BLOOD PRESSURE: 136 MMHG | HEART RATE: 86 BPM

## 2019-03-21 LAB
ANION GAP SERPL CALCULATED.3IONS-SCNC: 10 MMOL/L (ref 4–13)
BASOPHILS # BLD AUTO: 0.02 THOUSANDS/ΜL (ref 0–0.1)
BASOPHILS NFR BLD AUTO: 0 % (ref 0–1)
BUN SERPL-MCNC: 16 MG/DL (ref 5–25)
CALCIUM SERPL-MCNC: 8.8 MG/DL (ref 8.3–10.1)
CHLORIDE SERPL-SCNC: 100 MMOL/L (ref 100–108)
CO2 SERPL-SCNC: 28 MMOL/L (ref 21–32)
CREAT SERPL-MCNC: 0.81 MG/DL (ref 0.6–1.3)
EOSINOPHIL # BLD AUTO: 0 THOUSAND/ΜL (ref 0–0.61)
EOSINOPHIL NFR BLD AUTO: 0 % (ref 0–6)
ERYTHROCYTE [DISTWIDTH] IN BLOOD BY AUTOMATED COUNT: 13.6 % (ref 11.6–15.1)
GFR SERPL CREATININE-BSD FRML MDRD: 70 ML/MIN/1.73SQ M
GLUCOSE SERPL-MCNC: 161 MG/DL (ref 65–140)
HCT VFR BLD AUTO: 37.7 % (ref 34.8–46.1)
HGB BLD-MCNC: 12.5 G/DL (ref 11.5–15.4)
IMM GRANULOCYTES # BLD AUTO: 0.06 THOUSAND/UL (ref 0–0.2)
IMM GRANULOCYTES NFR BLD AUTO: 1 % (ref 0–2)
LYMPHOCYTES # BLD AUTO: 0.51 THOUSANDS/ΜL (ref 0.6–4.47)
LYMPHOCYTES NFR BLD AUTO: 7 % (ref 14–44)
MCH RBC QN AUTO: 29 PG (ref 26.8–34.3)
MCHC RBC AUTO-ENTMCNC: 33.2 G/DL (ref 31.4–37.4)
MCV RBC AUTO: 88 FL (ref 82–98)
MONOCYTES # BLD AUTO: 0.22 THOUSAND/ΜL (ref 0.17–1.22)
MONOCYTES NFR BLD AUTO: 3 % (ref 4–12)
NEUTROPHILS # BLD AUTO: 6.23 THOUSANDS/ΜL (ref 1.85–7.62)
NEUTS SEG NFR BLD AUTO: 89 % (ref 43–75)
NRBC BLD AUTO-RTO: 0 /100 WBCS
PLATELET # BLD AUTO: 225 THOUSANDS/UL (ref 149–390)
PMV BLD AUTO: 9.5 FL (ref 8.9–12.7)
POTASSIUM SERPL-SCNC: 3.6 MMOL/L (ref 3.5–5.3)
RBC # BLD AUTO: 4.31 MILLION/UL (ref 3.81–5.12)
SODIUM SERPL-SCNC: 138 MMOL/L (ref 136–145)
WBC # BLD AUTO: 7.04 THOUSAND/UL (ref 4.31–10.16)

## 2019-03-21 PROCEDURE — 94668 MNPJ CHEST WALL SBSQ: CPT

## 2019-03-21 PROCEDURE — 80048 BASIC METABOLIC PNL TOTAL CA: CPT | Performed by: PHYSICIAN ASSISTANT

## 2019-03-21 PROCEDURE — 94640 AIRWAY INHALATION TREATMENT: CPT

## 2019-03-21 PROCEDURE — 99232 SBSQ HOSP IP/OBS MODERATE 35: CPT | Performed by: PHYSICIAN ASSISTANT

## 2019-03-21 PROCEDURE — 94760 N-INVAS EAR/PLS OXIMETRY 1: CPT

## 2019-03-21 PROCEDURE — 85025 COMPLETE CBC W/AUTO DIFF WBC: CPT | Performed by: PHYSICIAN ASSISTANT

## 2019-03-21 PROCEDURE — 99239 HOSP IP/OBS DSCHRG MGMT >30: CPT | Performed by: NURSE PRACTITIONER

## 2019-03-21 RX ORDER — PREDNISONE 20 MG/1
TABLET ORAL
Qty: 15 TABLET | Refills: 0 | Status: SHIPPED | OUTPATIENT
Start: 2019-03-21 | End: 2019-06-03

## 2019-03-21 RX ORDER — DOXYCYCLINE HYCLATE 100 MG/1
100 CAPSULE ORAL EVERY 12 HOURS SCHEDULED
Qty: 8 CAPSULE | Refills: 0 | Status: SHIPPED | OUTPATIENT
Start: 2019-03-21 | End: 2019-03-25

## 2019-03-21 RX ORDER — GUAIFENESIN 600 MG
600 TABLET, EXTENDED RELEASE 12 HR ORAL EVERY 12 HOURS SCHEDULED
Refills: 0
Start: 2019-03-21 | End: 2019-03-25

## 2019-03-21 RX ORDER — ALBUTEROL SULFATE 90 UG/1
2 AEROSOL, METERED RESPIRATORY (INHALATION) EVERY 6 HOURS PRN
Qty: 8.5 G | Refills: 0 | Status: SHIPPED | OUTPATIENT
Start: 2019-03-21 | End: 2019-03-21 | Stop reason: HOSPADM

## 2019-03-21 RX ORDER — ALBUTEROL SULFATE 0.63 MG/3ML
1 SOLUTION RESPIRATORY (INHALATION) 3 TIMES DAILY PRN
Qty: 270 ML | Refills: 0 | Status: SHIPPED | OUTPATIENT
Start: 2019-03-21 | End: 2020-06-29 | Stop reason: SDUPTHER

## 2019-03-21 RX ADMIN — BENZONATATE 200 MG: 100 CAPSULE ORAL at 09:24

## 2019-03-21 RX ADMIN — PANTOPRAZOLE SODIUM 40 MG: 40 TABLET, DELAYED RELEASE ORAL at 05:48

## 2019-03-21 RX ADMIN — LEVOTHYROXINE SODIUM 50 MCG: 50 TABLET ORAL at 05:48

## 2019-03-21 RX ADMIN — LEVALBUTEROL HYDROCHLORIDE 1.25 MG: 1.25 SOLUTION, CONCENTRATE RESPIRATORY (INHALATION) at 08:16

## 2019-03-21 RX ADMIN — GUAIFENESIN 600 MG: 600 TABLET, EXTENDED RELEASE ORAL at 09:24

## 2019-03-21 RX ADMIN — BUDESONIDE AND FORMOTEROL FUMARATE DIHYDRATE 2 PUFF: 160; 4.5 AEROSOL RESPIRATORY (INHALATION) at 09:25

## 2019-03-21 RX ADMIN — HYDROCHLOROTHIAZIDE 25 MG: 25 TABLET ORAL at 09:24

## 2019-03-21 RX ADMIN — FLUOXETINE HYDROCHLORIDE 40 MG: 20 CAPSULE ORAL at 09:24

## 2019-03-21 RX ADMIN — DOXYCYCLINE HYCLATE 100 MG: 100 CAPSULE ORAL at 09:24

## 2019-03-21 RX ADMIN — IPRATROPIUM BROMIDE 0.5 MG: 0.5 SOLUTION RESPIRATORY (INHALATION) at 08:16

## 2019-03-21 RX ADMIN — IPRATROPIUM BROMIDE 0.5 MG: 0.5 SOLUTION RESPIRATORY (INHALATION) at 13:15

## 2019-03-21 RX ADMIN — METHYLPREDNISOLONE SODIUM SUCCINATE 40 MG: 40 INJECTION, POWDER, FOR SOLUTION INTRAMUSCULAR; INTRAVENOUS at 09:25

## 2019-03-21 RX ADMIN — ENOXAPARIN SODIUM 40 MG: 40 INJECTION SUBCUTANEOUS at 09:25

## 2019-03-21 RX ADMIN — LEVALBUTEROL HYDROCHLORIDE 1.25 MG: 1.25 SOLUTION, CONCENTRATE RESPIRATORY (INHALATION) at 13:15
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
## 2019-03-21 NOTE — SOCIAL WORK
CM met with patient to inform of IMM and provided a copy of IMM  Signed IMM is in the chart  Patient is discharged home with [facility] services
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
## 2019-03-21 NOTE — PLAN OF CARE
Problem: DISCHARGE PLANNING - CARE MANAGEMENT  Goal: Discharge to post-acute care or home with appropriate resources  Description  INTERVENTIONS:  - Conduct assessment to determine patient/family and health care team treatment goals, and need for post-acute services based on payer coverage, community resources, and patient preferences, and barriers to discharge  - Address psychosocial, clinical, and financial barriers to discharge as identified in assessment in conjunction with the patient/family and health care team  - Arrange appropriate level of post-acute services according to patient?s   needs and preference and payer coverage in collaboration with the physician and health care team  - Communicate with and update the patient/family, physician, and health care team regarding progress on the discharge plan  - Arrange appropriate transportation to post-acute venues  Outcome: Progressing   CM met with patient at bedside  Patient state she lives alone in a two story house  There are 5 stairs to enter the house from outside  Patient does not use DME  Patient states she has the lifeline alert system  Patient does not have rehab hx  Patient does have hhc hx and is interested in having hhc at home  CM made referral   Patient fills prescriptions with [id number]  Patient is being treated for anxiety  Patient denies substance abuse  Patient states Carlito is POA  Patient is retired  Patient drives to appointments  Upon clearance for discharge patient will be transport home via friend  Patient's plan is to be discharge with [facility] services  Nurse and SLIM notified

## 2019-03-21 NOTE — DISCHARGE SUMMARY
Discharge- Arianna Leaver 1940, 69 y o  female MRN: [id number]    Unit/Bed#: -01 Encounter: [id number]    Primary Care Provider: Latanya Thompson MD   Date and time admitted to hospital: 3/18/2019 10:22 PM        * Hypoxia  Assessment & Plan  · Patient presented with productive cough and wheezing after a prolonged secondary smoke exposure over the weekend at a casino  She was 89% on room air when she arrived to the emergency department and was placed on 3 L of oxygen by nasal cannula in the ED  Currently, she is improving and off the oxygen  · She is admitted with a COPD exacerbation  · CXR showed minimal atelectasis right lung  · On Solu-Medrol 40 mg every 12 hours daily and Doxycycline course  Can transition to oral prednisone to complete slow taper and complete 7 day course of doxy this is day 3/7 patient cleared from pulmonology perspective for discharge home  · Respiratory protocol and airway clearance measures  · Continue Atrovent/Xopenex Nebs  · Influenza/RSV negative  Follow up sputum culture  · Patient kept an additional midnight in setting of bronchial spasm overall this has improved      COPD exacerbation ([address] )  Assessment & Plan  · Transition patient from Solumedrol IV for COPD exacerbation to oral taper and Doxycycline course (needs 7 days total) for acute tracheobronchitis  · Respiratory protocol/Nebulizers, airway clearance measures  Symbicort  · Pulmonary following  Input appreciated  Hypothyroidism  Assessment & Plan  · Continue Levothyroxine      Hypokalemia  Assessment & Plan  · Status post replete  · Potassium has normalized          Discharging Physician / Practitioner: MARIO ALBERTO Forbes  PCP: Latanya Thompson MD  Admission Date:   Admission Orders (From admission, onward)    Ordered        03/19/19 0007  Inpatient Admission  Once     Order ID Start Status   [id number] 03/19/19 0008 Completed              Discharge Date: 03/21/19    Resolved Problems  Date Reviewed: 3/21/2019    None          Consultations During Hospital Stay:  · Pulmonology    Procedures Performed:   · None    Significant Findings / Test Results:   · COPD exacerbation  · Acute pulmonary insufficiency  · Acute tracheal bronchitis    Incidental Findings:   · Hypokalemia status post replete resolved     Test Results Pending at Discharge (will require follow up): · None     Outpatient Tests Requested:  · Per pulmonology    Complications:  Bronchial spasm    Reason for Admission:  Hypoxia in setting of COPD exacerbation and acute tracheobronchitis    Hospital Course:     Zuly Malone is a 69 y o  female patient who originally presented to the hospital on 3/18/2019 due to increased productive cough shortness of breath progressively came worse with little improvement at home prompting patient come to the ED for further evaluation  Patient is a very active 52-year-old female who just recently return from a trip from [place] where she was on a cruise trip with a lot of smoke exposure and noted that since the trip she has become increasingly short of breath prompting the visit to the ED  Patient was given nebulizers and steroids with some improvement in the ED patient was admitted for pulmonary evaluation started on IV diet doxy was found to have COPD exacerbation with tracheobronchitis and hypoxia  Patient over showed steady improvement of her symptoms however was held an additional 24 hours due to bronchial spasms and wanted to make sure the patient was overall clinically improving  Patient showed overall resolution of her bronchial spasms was seen by pulmonology and cleared for discharge home and should follow up as an outpatient  Please see above list of diagnoses and related plan for additional information       Condition at Discharge: good     Discharge Day Visit / Exam:     Subjective:  Patient reports no further coughing and overall generally feels better that her chest symptoms she was experience have now resolved  Patient question if she could go on a bus trip next week patient was made aware that she is not cleared to go on a trip next week and would need to resume clearance from pulmonology and her PCP for her next cruise on 04/27/2019  Patient stated understanding  Vitals: Blood Pressure: 136/78 (03/21/19 0827)  Pulse: 86 (03/21/19 0827)  Temperature: 98 2 °F (36 8 °C) (03/21/19 0827)  Temp Source: Oral (03/21/19 0827)  Respirations: 20 (03/21/19 0827)  Height: 5' 2" (157 5 cm) (03/19/19 1810)  Weight - Scale: 92 7 kg (204 lb 5 9 oz) (03/19/19 1810)  SpO2: 93 % (03/21/19 1316)  Exam:   Physical Exam   Constitutional: She is oriented to person, place, and time  HENT:   Head: Normocephalic and atraumatic  Eyes: Conjunctivae and EOM are normal    Neck: Normal range of motion  Cardiovascular: Normal rate, regular rhythm and normal heart sounds  Pulmonary/Chest: Effort normal  She has wheezes in the right upper field, the right middle field, the right lower field, the left upper field and the left lower field  Abdominal: Soft  Bowel sounds are normal    Musculoskeletal: Normal range of motion  Neurological: She is alert and oriented to person, place, and time  Skin: Skin is warm and dry  Psychiatric: She has a normal mood and affect  Her behavior is normal        Discharge instructions/Information to patient and family:   See after visit summary for information provided to patient and family  Provisions for Follow-Up Care:  See after visit summary for information related to follow-up care and any pertinent home health orders  Disposition:     Home    For Discharges to [facility] SNF:   · Not Applicable to this Patient - Not Applicable to this Patient    Planned Readmission:  None     Discharge Statement:  I spent 40 minutes discharging the patient  This time was spent on the day of discharge  I had direct contact with the patient on the day of discharge  Greater than 50% of the total time was spent examining patient, answering all patient questions, arranging and discussing plan of care with patient as well as directly providing post-discharge instructions  Additional time then spent on discharge activities  Discharge Medications:  See after visit summary for reconciled discharge medications provided to patient and family        ** Please Note: This note has been constructed using a voice recognition system **

## 2019-03-21 NOTE — SOCIAL WORK
CM met with patient at bedside  Patient state she lives alone in a two story house  There are 5 stairs to enter the house from outside  Patient does not use DME  Patient states she has the lifeline alert system  Patient does not have rehab hx  Patient does have hhc hx and is interested in having hhc at home  CM made referral   Patient fills prescriptions with [facility]97  Patient is being treated for anxiety  Patient denies substance abuse  Patient states Carlito is POA  Patient is retired  Patient drives to appointments  Upon clearance for discharge patient will be transport home via friend  Patient's plan is to be discharge with [place] [id number] services  Nurse and SLIM notified  CM reviewed discharge planning process including the following: identifying help at home, patient preference for discharge planning needs, pharmacy preference, and availability of treatment team to discuss questions or concerns patient and/or family may have regarding understanding medications and recognizing signs and symptoms once discharged  CM also encouraged patient to follow up with all recommended appointments after discharge  Patient advised of importance for patient and family to participate in managing patients medical well being  CM name and role reviewed  Discharge Checklist reviewed and CM will continue to monitor for progress toward discharge goals in nursing and provider rounds

## 2019-03-21 NOTE — ASSESSMENT & PLAN NOTE
· Transition patient from Solumedrol IV for COPD exacerbation to oral taper and Doxycycline course (needs 7 days total) for acute tracheobronchitis  · Respiratory protocol/Nebulizers, airway clearance measures  Symbicort  · Pulmonary following  Input appreciated

## 2019-04-04 ENCOUNTER — OFFICE VISIT (OUTPATIENT)
Dept: PULMONOLOGY | Facility: CLINIC | Age: 79
End: 2019-04-04
Payer: MEDICARE

## 2019-04-04 ENCOUNTER — APPOINTMENT (OUTPATIENT)
Dept: LAB | Facility: CLINIC | Age: 79
End: 2019-04-04
Payer: MEDICARE

## 2019-04-04 VITALS
WEIGHT: 189 LBS | SYSTOLIC BLOOD PRESSURE: 116 MMHG | HEIGHT: 62 IN | DIASTOLIC BLOOD PRESSURE: 74 MMHG | HEART RATE: 74 BPM | BODY MASS INDEX: 34.78 KG/M2 | OXYGEN SATURATION: 94 %

## 2019-04-04 DIAGNOSIS — J30.9 ALLERGIC RHINITIS, UNSPECIFIED SEASONALITY, UNSPECIFIED TRIGGER: ICD-10-CM

## 2019-04-04 DIAGNOSIS — J44.9 ASTHMA WITH COPD (HCC): Primary | ICD-10-CM

## 2019-04-04 DIAGNOSIS — Z87.891 HISTORY OF TOBACCO USE: ICD-10-CM

## 2019-04-04 DIAGNOSIS — B37.0 THRUSH, ORAL: ICD-10-CM

## 2019-04-04 DIAGNOSIS — J44.9 ASTHMA WITH COPD (HCC): ICD-10-CM

## 2019-04-04 PROCEDURE — 82785 ASSAY OF IGE: CPT

## 2019-04-04 PROCEDURE — 36415 COLL VENOUS BLD VENIPUNCTURE: CPT

## 2019-04-04 PROCEDURE — 99214 OFFICE O/P EST MOD 30 MIN: CPT | Performed by: PHYSICIAN ASSISTANT

## 2019-04-04 PROCEDURE — 86003 ALLG SPEC IGE CRUDE XTRC EA: CPT

## 2019-04-04 RX ORDER — MONTELUKAST SODIUM 10 MG/1
10 TABLET ORAL
Qty: 30 TABLET | Refills: 3 | Status: SHIPPED | OUTPATIENT
Start: 2019-04-04 | End: 2020-06-29 | Stop reason: SDUPTHER

## 2019-04-04 RX ORDER — FLUTICASONE PROPIONATE 50 MCG
1 SPRAY, SUSPENSION (ML) NASAL DAILY
Qty: 1 BOTTLE | Refills: 3 | Status: SHIPPED | OUTPATIENT
Start: 2019-04-04 | End: 2021-03-23

## 2019-04-04 RX ORDER — CLOTRIMAZOLE 10 MG/1
10 LOZENGE ORAL; TOPICAL
Qty: 70 TABLET | Refills: 0 | Status: SHIPPED | OUTPATIENT
Start: 2019-04-04

## 2019-04-07 LAB
A ALTERNATA IGE QN: <0.1 KUA/I
A FUMIGATUS IGE QN: <0.1 KUA/I
ALLERGEN COMMENT: ABNORMAL
BERMUDA GRASS IGE QN: <0.1 KUA/I
BOXELDER IGE QN: <0.1 KUA/I
C HERBARUM IGE QN: <0.1 KUA/I
CAT DANDER IGE QN: <0.1 KUA/I
CMN PIGWEED IGE QN: <0.1 KUA/I
COMMON RAGWEED IGE QN: <0.1 KUA/I
COTTONWOOD IGE QN: <0.1 KUA/I
D FARINAE IGE QN: <0.1 KUA/I
D PTERONYSS IGE QN: <0.1 KUA/I
DOG DANDER IGE QN: <0.1 KUA/I
LONDON PLANE IGE QN: <0.1 KUA/I
MOUSE URINE PROT IGE QN: <0.1 KUA/I
MT JUNIPER IGE QN: <0.1 KUA/I
MUGWORT IGE QN: <0.1 KUA/I
P NOTATUM IGE QN: <0.1 KUA/I
ROACH IGE QN: <0.1 KUA/I
SHEEP SORREL IGE QN: <0.1 KUA/I
SILVER BIRCH IGE QN: 0.47 KUA/I
TIMOTHY IGE QN: <0.1 KUA/I
TOTAL IGE SMQN RAST: 9.63 KU/L (ref 0–113)
WALNUT IGE QN: <0.1 KUA/I
WHITE ASH IGE QN: <0.1 KUA/I
WHITE ELM IGE QN: <0.1 KUA/I
WHITE MULBERRY IGE QN: <0.1 KUA/I
WHITE OAK IGE QN: 0.27 KUA/I

## 2019-05-20 ENCOUNTER — HOSPITAL ENCOUNTER (OUTPATIENT)
Dept: PULMONOLOGY | Facility: HOSPITAL | Age: 79
Discharge: HOME | End: 2019-05-20
Payer: MEDICARE

## 2019-05-20 DIAGNOSIS — J44.9 ASTHMA WITH COPD (HCC): ICD-10-CM

## 2019-05-20 PROCEDURE — 94761 N-INVAS EAR/PLS OXIMETRY MLT: CPT

## 2019-05-20 PROCEDURE — 94727 GAS DIL/WSHOT DETER LNG VOL: CPT

## 2019-05-20 PROCEDURE — 94060 EVALUATION OF WHEEZING: CPT | Performed by: INTERNAL MEDICINE

## 2019-05-20 PROCEDURE — 94729 DIFFUSING CAPACITY: CPT | Performed by: INTERNAL MEDICINE

## 2019-05-20 PROCEDURE — 94727 GAS DIL/WSHOT DETER LNG VOL: CPT | Performed by: INTERNAL MEDICINE

## 2019-05-20 PROCEDURE — 94729 DIFFUSING CAPACITY: CPT

## 2019-05-20 PROCEDURE — 94618 PULMONARY STRESS TESTING: CPT | Performed by: INTERNAL MEDICINE

## 2019-05-20 PROCEDURE — 94060 EVALUATION OF WHEEZING: CPT

## 2019-05-20 RX ORDER — ALBUTEROL SULFATE 2.5 MG/3ML
2.5 SOLUTION RESPIRATORY (INHALATION) ONCE
Status: COMPLETED | OUTPATIENT
Start: 2019-05-20 | End: 2019-05-20

## 2019-05-20 RX ADMIN — ALBUTEROL SULFATE 2.5 MG: 2.5 SOLUTION RESPIRATORY (INHALATION) at 10:23

## 2019-06-03 ENCOUNTER — OFFICE VISIT (OUTPATIENT)
Dept: PULMONOLOGY | Facility: CLINIC | Age: 79
End: 2019-06-03
Payer: MEDICARE

## 2019-06-03 VITALS
SYSTOLIC BLOOD PRESSURE: 120 MMHG | WEIGHT: 188 LBS | HEIGHT: 62 IN | DIASTOLIC BLOOD PRESSURE: 88 MMHG | BODY MASS INDEX: 34.6 KG/M2 | OXYGEN SATURATION: 95 % | HEART RATE: 65 BPM

## 2019-06-03 DIAGNOSIS — J44.9 ASTHMA WITH COPD (HCC): Primary | ICD-10-CM

## 2019-06-03 PROCEDURE — 1124F ACP DISCUSS-NO DSCNMKR DOCD: CPT | Performed by: PHYSICIAN ASSISTANT

## 2019-06-03 PROCEDURE — 99214 OFFICE O/P EST MOD 30 MIN: CPT | Performed by: PHYSICIAN ASSISTANT

## 2020-03-04 ENCOUNTER — TELEPHONE (OUTPATIENT)
Dept: PULMONOLOGY | Facility: CLINIC | Age: 80
End: 2020-03-04

## 2020-03-04 NOTE — TELEPHONE ENCOUNTER
Pt called and lm that she is in [facility]  She is supposed to fly home soon and is very concerned about travel  Her family is asking her to not fly  She states she has copd and wants some guidance  She can be reached at [phone]   Please advise

## 2020-03-04 NOTE — TELEPHONE ENCOUNTER
As of now, there are no CDC guidelines regarding travel from [place] to [place]  She is at increased risk for infection given her history of COPD  If she is thinking about pushing back her travel plans, it couldn't hurt  If she decides to travel, I would advise careful handwashing and avoiding contact with people who appear to be ill/coughing or sneezing  I will also forward this to Dr Almond Schaumann to see if she has any additional recommendations

## 2020-05-28 ENCOUNTER — TELEPHONE (OUTPATIENT)
Dept: PULMONOLOGY | Facility: CLINIC | Age: 80
End: 2020-05-28

## 2020-06-02 ENCOUNTER — TELEPHONE (OUTPATIENT)
Dept: PULMONOLOGY | Facility: CLINIC | Age: 80
End: 2020-06-02

## 2020-06-26 ENCOUNTER — TELEPHONE (OUTPATIENT)
Dept: PULMONOLOGY | Facility: CLINIC | Age: 80
End: 2020-06-26

## 2020-06-29 ENCOUNTER — APPOINTMENT (OUTPATIENT)
Dept: RADIOLOGY | Facility: CLINIC | Age: 80
End: 2020-06-29
Payer: MEDICARE

## 2020-06-29 ENCOUNTER — OFFICE VISIT (OUTPATIENT)
Dept: PULMONOLOGY | Facility: CLINIC | Age: 80
End: 2020-06-29
Payer: MEDICARE

## 2020-06-29 VITALS
HEIGHT: 62 IN | BODY MASS INDEX: 33.31 KG/M2 | TEMPERATURE: 97.6 F | WEIGHT: 181 LBS | HEART RATE: 64 BPM | SYSTOLIC BLOOD PRESSURE: 104 MMHG | OXYGEN SATURATION: 94 % | DIASTOLIC BLOOD PRESSURE: 80 MMHG

## 2020-06-29 DIAGNOSIS — J44.9 ASTHMA WITH COPD (HCC): ICD-10-CM

## 2020-06-29 DIAGNOSIS — J45.909 MODERATE ASTHMA WITHOUT COMPLICATION, UNSPECIFIED WHETHER PERSISTENT: ICD-10-CM

## 2020-06-29 DIAGNOSIS — J45.909 MODERATE ASTHMA WITHOUT COMPLICATION, UNSPECIFIED WHETHER PERSISTENT: Primary | ICD-10-CM

## 2020-06-29 DIAGNOSIS — J30.9 ALLERGIC RHINITIS, UNSPECIFIED SEASONALITY, UNSPECIFIED TRIGGER: ICD-10-CM

## 2020-06-29 DIAGNOSIS — Z87.891 HISTORY OF TOBACCO USE: ICD-10-CM

## 2020-06-29 PROCEDURE — 99214 OFFICE O/P EST MOD 30 MIN: CPT | Performed by: INTERNAL MEDICINE

## 2020-06-29 PROCEDURE — 71046 X-RAY EXAM CHEST 2 VIEWS: CPT

## 2020-06-29 RX ORDER — MONTELUKAST SODIUM 10 MG/1
10 TABLET ORAL
Qty: 30 TABLET | Refills: 0 | Status: SHIPPED | OUTPATIENT
Start: 2020-06-29 | End: 2020-06-29 | Stop reason: SDUPTHER

## 2020-06-29 RX ORDER — ALBUTEROL SULFATE 0.63 MG/3ML
1 SOLUTION RESPIRATORY (INHALATION) 3 TIMES DAILY PRN
Qty: 270 ML | Refills: 0 | Status: SHIPPED | OUTPATIENT
Start: 2020-06-29 | End: 2020-06-29 | Stop reason: SDUPTHER

## 2020-06-29 RX ORDER — MONTELUKAST SODIUM 10 MG/1
10 TABLET ORAL
Qty: 90 TABLET | Refills: 1 | Status: SHIPPED | OUTPATIENT
Start: 2020-06-29 | End: 2020-10-20

## 2020-06-29 RX ORDER — ALBUTEROL SULFATE 0.63 MG/3ML
1 SOLUTION RESPIRATORY (INHALATION) 3 TIMES DAILY PRN
Qty: 270 ML | Refills: 0 | Status: SHIPPED | OUTPATIENT
Start: 2020-06-29 | End: 2020-10-02 | Stop reason: SDUPTHER

## 2020-07-01 ENCOUNTER — TELEPHONE (OUTPATIENT)
Dept: PULMONOLOGY | Facility: CLINIC | Age: 80
End: 2020-07-01

## 2020-08-13 ENCOUNTER — TELEPHONE (OUTPATIENT)
Dept: PULMONOLOGY | Facility: MEDICAL CENTER | Age: 80
End: 2020-08-13

## 2020-08-13 ENCOUNTER — TELEPHONE (OUTPATIENT)
Dept: PULMONOLOGY | Facility: CLINIC | Age: 80
End: 2020-08-13

## 2020-08-13 NOTE — TELEPHONE ENCOUNTER

## 2020-08-14 ENCOUNTER — TELEMEDICINE (OUTPATIENT)
Dept: PULMONOLOGY | Facility: CLINIC | Age: 80
End: 2020-08-14
Payer: MEDICARE

## 2020-08-14 DIAGNOSIS — J44.9 ASTHMA WITH COPD (HCC): Primary | ICD-10-CM

## 2020-08-14 DIAGNOSIS — J30.2 SEASONAL ALLERGIC RHINITIS, UNSPECIFIED TRIGGER: ICD-10-CM

## 2020-08-14 PROCEDURE — 99213 OFFICE O/P EST LOW 20 MIN: CPT | Performed by: PHYSICIAN ASSISTANT

## 2020-08-14 NOTE — PROGRESS NOTES
Virtual Regular Visit      Assessment/Plan:    Problem List Items Addressed This Visit        Respiratory    Asthma with COPD ([facility] [address] ) - Primary      Other Visit Diagnoses     Seasonal allergic rhinitis, unspecified trigger            Video visit was done today for follow-up  Patient continues on her Symbicort twice per day  She has been using her nebulizer 2-3 times per day with the very humid weather which she does feel helps her breathing significantly  She does still have some dyspnea on exertion particularly when she does a lot of heavy lifting  She will continue with her nebulizer to 3 times per day until the weather cools off  She can then cut it down at that time and see how she feels  She will continue with her Singulair, can add an over-the-counter antihistamine given her increased nasal symptoms  She does have Flonase and saline nasal spray, she is not using either of these regularly  Did tell her to start using the Flonase on a daily basis given her increased postnasal drip  She will follow-up with us in 3 months or sooner if necessary  Reason for visit is   Chief Complaint   Patient presents with    Virtual Regular Visit        Encounter provider Maria Del Carmen Wong PA-C    Provider located at [address] [place] [address] [place] [address]      Recent Visits  Date Type Provider Dept   08/13/20 Telephone Marcelle Goetz [facility]   Showing recent visits within past 7 days and meeting all other requirements     Today's Visits  Date Type Provider Dept   08/14/20 Telemedicine Maria Del Carmen Wong PA-C [facility]   Showing today's visits and meeting all other requirements     Future Appointments  No visits were found meeting these conditions     Showing future appointments within next 150 days and meeting all other requirements        The patient was identified by name and date of birth  Yaa Plasencia was informed that this is a telemedicine visit and that the visit is being conducted through [address] and patient was informed that this is not a secure, HIPAA-complaint platform  She agrees to proceed     My office door was closed  No one else was in the room  She acknowledged consent and understanding of privacy and security of the video platform  The patient has agreed to participate and understands they can discontinue the visit at any time  Patient is aware this is a billable service  Subjective  Yaa Plasencia is a 67 y o  female  Patient is a 68 yo female former smoker with PMH of asthma/COPD  Video visit was done today for follow-up  She is overall stable with her breathing, she has been using her nebulizer more regularly which she feels helps especially with the more humid weather  She continues with her Symbicort twice per day, Singulair at night  She has been having some increased postnasal drip  She does have Flonase as well as saline nasal spray, neither which she uses regularly                                  Past Medical History:   Diagnosis Date    Anxiety     COPD (chronic obstructive pulmonary disease) ([facility] [address] )     Depression     Disease of thyroid gland     Hypotension     Wrist fracture        Past Surgical History:   Procedure Laterality Date    BACK SURGERY  2017    INCONTINENCE SURGERY      NEPHRECTOMY TRANSPLANTED ORGAN      REPLACEMENT TOTAL KNEE BILATERAL         Current Outpatient Medications   Medication Sig Dispense Refill    albuterol (ACCUNEB) 0 63 MG/3ML nebulizer solution Take 3 mL (0 63 mg total) by nebulization 3 (three) times a day as needed for wheezing 270 mL 0    albuterol (PROVENTIL HFA,VENTOLIN HFA) 90 mcg/act inhaler Inhale 1 puff every 6 (six) hours as needed for wheezing      aspirin 81 mg chewable tablet Chew 81 mg daily      B Complex-Folic Acid (B COMPLEX FORMULA 1) TABS Take by mouth      benzonatate (TESSALON PERLES) 100 mg capsule Take 100 mg by mouth 3 (three) times a day as needed for cough      budesonide-formoterol (SYMBICORT) 160-4 5 mcg/act inhaler Symbicort 160 mcg-4 5 mcg/actuation HFA aerosol inhaler      clotrimazole (MYCELEX) 10 mg james Take 1 tablet (10 mg total) by mouth 5 (five) times a day 70 tablet 0    FLUoxetine (PROzac) 40 MG capsule Take 40 mg by mouth daily      fluticasone (FLONASE) 50 mcg/act nasal spray 1 spray into each nostril daily 1 Bottle 3    hydrochlorothiazide (HYDRODIURIL) 25 mg tablet Take 25 mg by mouth daily      levothyroxine 50 mcg tablet Take 50 mcg by mouth daily      LORazepam (ATIVAN) 0 5 mg tablet lorazepam 0 5 mg tablet      montelukast (SINGULAIR) 10 mg tablet Take 1 tablet (10 mg total) by mouth daily at bedtime 90 tablet 1    pantoprazole (PROTONIX) 40 mg tablet Take 40 mg by mouth daily      simvastatin (ZOCOR) 10 mg tablet Take 10 mg by mouth daily at bedtime      sodium chloride (OCEAN) 0 65 % nasal spray 1 spray into each nostril as needed for congestion or rhinitis 15 mL 3    Zoster Vaccine Live (ZOSTAVAX) 63438 UNT/0 65ML SUSR Zostavax (PF) 19,400 unit/0 65 mL subcutaneous suspension       No current facility-administered medications for this visit  Allergies   Allergen Reactions    Fruit C [Ascorbate]      Reports allergy to fresh fruit       Review of Systems   Constitutional: Negative  HENT: Positive for congestion and postnasal drip  Respiratory: Positive for shortness of breath  Cardiovascular: Negative  Gastrointestinal: Negative  Genitourinary: Negative  Musculoskeletal: Negative  Skin: Negative  Allergic/Immunologic: Positive for environmental allergies  Neurological: Negative  Psychiatric/Behavioral: Negative  Video Exam    There were no vitals filed for this visit  Physical Exam  Constitutional:       General: She is not in acute distress  Appearance: Normal appearance   She is not ill-appearing  HENT:      Head: Normocephalic and atraumatic  Neck:      Musculoskeletal: Normal range of motion  Pulmonary:      Effort: Pulmonary effort is normal  No respiratory distress  Abdominal:      General: There is no distension  Musculoskeletal: Normal range of motion  Right lower leg: No edema  Left lower leg: No edema  Neurological:      Mental Status: She is alert and oriented to person, place, and time  Psychiatric:         Mood and Affect: Mood normal          Behavior: Behavior normal          Thought Content: Thought content normal          Judgment: Judgment normal           I spent 12 minutes directly with the patient during this visit      VIRTUAL VISIT [address] acknowledges that she has consented to an online visit or consultation  She understands that the online visit is based solely on information provided by her, and that, in the absence of a face-to-face physical evaluation by the physician, the diagnosis she receives is both limited and provisional in terms of accuracy and completeness  This is not intended to replace a full medical face-to-face evaluation by the physician  Alexandria Bush understands and accepts these terms

## 2020-09-02 ENCOUNTER — TELEPHONE (OUTPATIENT)
Dept: PULMONOLOGY | Facility: CLINIC | Age: 80
End: 2020-09-02

## 2020-09-02 NOTE — TELEPHONE ENCOUNTER
PT CALLED TO LET US KNOW SHE STILL FEELS A BIT SOB  SHE ADMITS THAT SHE INCREASED NEB TO 3 TIMES DAILY BUT STOPPED ONCE SHE FELT OK  I ENCOURAGED HER TO US NEB UP TO 4 TIMES DAILY TO GET THROUGH THE HUMID WEATHER  SHE WILL CALL US WITH AN UPDATE ON Friday

## 2020-09-29 DIAGNOSIS — J44.9 ASTHMA WITH COPD (HCC): Primary | ICD-10-CM

## 2020-09-29 RX ORDER — ALBUTEROL SULFATE 90 UG/1
1 AEROSOL, METERED RESPIRATORY (INHALATION) EVERY 6 HOURS PRN
Qty: 3 INHALER | Refills: 5 | Status: SHIPPED | OUTPATIENT
Start: 2020-09-29

## 2020-10-02 ENCOUNTER — TELEPHONE (OUTPATIENT)
Dept: PULMONOLOGY | Facility: CLINIC | Age: 80
End: 2020-10-02

## 2020-10-02 DIAGNOSIS — J44.9 ASTHMA WITH COPD (HCC): Primary | ICD-10-CM

## 2020-10-02 DIAGNOSIS — J30.2 SEASONAL ALLERGIC RHINITIS, UNSPECIFIED TRIGGER: ICD-10-CM

## 2020-10-02 RX ORDER — ALBUTEROL SULFATE 0.63 MG/3ML
1 SOLUTION RESPIRATORY (INHALATION) EVERY 6 HOURS PRN
Qty: 1080 ML | Refills: 3 | Status: SHIPPED | OUTPATIENT
Start: 2020-10-02

## 2020-10-02 RX ORDER — FLUTICASONE PROPIONATE 50 MCG
1 SPRAY, SUSPENSION (ML) NASAL DAILY
Qty: 1 BOTTLE | Refills: 3 | Status: SHIPPED | OUTPATIENT
Start: 2020-10-02 | End: 2021-03-23

## 2020-10-20 DIAGNOSIS — J44.9 ASTHMA WITH COPD (HCC): ICD-10-CM

## 2020-10-20 RX ORDER — MONTELUKAST SODIUM 10 MG/1
TABLET ORAL
Qty: 90 TABLET | Refills: 3 | Status: SHIPPED | OUTPATIENT
Start: 2020-10-20 | End: 2021-04-05 | Stop reason: SDUPTHER

## 2020-11-09 ENCOUNTER — OFFICE VISIT (OUTPATIENT)
Dept: PULMONOLOGY | Facility: CLINIC | Age: 80
End: 2020-11-09
Payer: MEDICARE

## 2020-11-09 VITALS
SYSTOLIC BLOOD PRESSURE: 120 MMHG | HEIGHT: 62 IN | HEART RATE: 77 BPM | WEIGHT: 178 LBS | OXYGEN SATURATION: 95 % | TEMPERATURE: 97.8 F | DIASTOLIC BLOOD PRESSURE: 84 MMHG | BODY MASS INDEX: 32.76 KG/M2

## 2020-11-09 DIAGNOSIS — E66.9 OBESITY (BMI 30-39.9): ICD-10-CM

## 2020-11-09 DIAGNOSIS — R06.02 SHORTNESS OF BREATH: Primary | ICD-10-CM

## 2020-11-09 DIAGNOSIS — F17.211 NICOTINE DEPENDENCE, CIGARETTES, IN REMISSION: ICD-10-CM

## 2020-11-09 DIAGNOSIS — J30.89 ENVIRONMENTAL AND SEASONAL ALLERGIES: ICD-10-CM

## 2020-11-09 DIAGNOSIS — K44.9 HIATAL HERNIA WITH GERD: ICD-10-CM

## 2020-11-09 DIAGNOSIS — K21.9 HIATAL HERNIA WITH GERD: ICD-10-CM

## 2020-11-09 DIAGNOSIS — R91.1 PULMONARY NODULE: ICD-10-CM

## 2020-11-09 DIAGNOSIS — J45.40 MODERATE PERSISTENT ASTHMA WITHOUT COMPLICATION: ICD-10-CM

## 2020-11-09 PROCEDURE — 99214 OFFICE O/P EST MOD 30 MIN: CPT | Performed by: PHYSICIAN ASSISTANT

## 2020-12-17 ENCOUNTER — TELEPHONE (OUTPATIENT)
Dept: OTHER | Facility: OTHER | Age: 80
End: 2020-12-17

## 2021-02-08 ENCOUNTER — IMMUNIZATIONS (OUTPATIENT)
Dept: FAMILY MEDICINE CLINIC | Facility: HOSPITAL | Age: 81
End: 2021-02-08

## 2021-02-08 DIAGNOSIS — Z23 ENCOUNTER FOR IMMUNIZATION: Primary | ICD-10-CM

## 2021-02-08 PROCEDURE — 0011A SARS-COV-2 / COVID-19 MRNA VACCINE (MODERNA) 100 MCG: CPT

## 2021-02-08 PROCEDURE — 91301 SARS-COV-2 / COVID-19 MRNA VACCINE (MODERNA) 100 MCG: CPT

## 2021-03-09 ENCOUNTER — IMMUNIZATIONS (OUTPATIENT)
Dept: FAMILY MEDICINE CLINIC | Facility: HOSPITAL | Age: 81
End: 2021-03-09

## 2021-03-09 DIAGNOSIS — Z23 ENCOUNTER FOR IMMUNIZATION: Primary | ICD-10-CM

## 2021-03-09 PROCEDURE — 0012A SARS-COV-2 / COVID-19 MRNA VACCINE (MODERNA) 100 MCG: CPT

## 2021-03-09 PROCEDURE — 91301 SARS-COV-2 / COVID-19 MRNA VACCINE (MODERNA) 100 MCG: CPT

## 2021-03-23 ENCOUNTER — OFFICE VISIT (OUTPATIENT)
Dept: PULMONOLOGY | Facility: CLINIC | Age: 81
End: 2021-03-23
Payer: MEDICARE

## 2021-03-23 VITALS
WEIGHT: 185.8 LBS | BODY MASS INDEX: 34.19 KG/M2 | HEART RATE: 76 BPM | DIASTOLIC BLOOD PRESSURE: 84 MMHG | HEIGHT: 62 IN | TEMPERATURE: 97.7 F | OXYGEN SATURATION: 94 % | SYSTOLIC BLOOD PRESSURE: 120 MMHG

## 2021-03-23 DIAGNOSIS — G47.33 OSA (OBSTRUCTIVE SLEEP APNEA): ICD-10-CM

## 2021-03-23 DIAGNOSIS — J44.9 ASTHMA WITH COPD (HCC): Primary | ICD-10-CM

## 2021-03-23 DIAGNOSIS — J30.9 ALLERGIC RHINITIS, UNSPECIFIED SEASONALITY, UNSPECIFIED TRIGGER: ICD-10-CM

## 2021-03-23 PROCEDURE — 99214 OFFICE O/P EST MOD 30 MIN: CPT | Performed by: PHYSICIAN ASSISTANT

## 2021-03-23 RX ORDER — AZELASTINE 1 MG/ML
1 SPRAY, METERED NASAL 2 TIMES DAILY
Qty: 1 BOTTLE | Refills: 3 | Status: SHIPPED | OUTPATIENT
Start: 2021-03-23

## 2021-03-23 NOTE — PROGRESS NOTES
Assessment/Plan:   Diagnoses and all orders for this visit:    Asthma with COPD ([facility] [place] [address] )    CHUYITA (obstructive sleep apnea)  -     Diagnostic Sleep Study; Future    Allergic rhinitis, unspecified seasonality, unspecified trigger  -     azelastine (ASTELIN) 0 1 % nasal spray; 1 spray into each nostril 2 (two) times a day Use in each nostril as directed     Patient is here today for follow-up  She overall remains stable with her breathing, continues on Symbicort, Spiriva, albuterol as needed  She does continue to have a chronic cough as well as chronic dyspnea on exertion both of which are stable  She has used Flonase but would like to try a different nasal spray  She does already takes Singulair, she can try adding an antihistamine as well to help with the postnasal drip  Will send Astelin to the pharmacy for her  Sleep apnea has been discussed with her in the past, she does have nighttime awakenings as well as some vivid dreams  Does have excessive daytime sleepiness and finds she falls asleep during the day such as when watching TV  She is now agreeable to a diagnostic sleep study which I will order  She currently takes Prozac, has noted over the last several months that she is more irritable  Finds that minor issues anger her more easily than they had in the past   She does not find much interest in doing her previous activities  Discussed with her that it sounds like her depression has worsened, likely a lot to do with COVID and not being able to do her regular activities such as going to [employer]  She does have a follow-up with her PCP tomorrow and will discuss this with him  Now that the weather is nicer, she did notice that she felt a little better sitting out in the sun the other day  She will follow-up with us in 4 months or sooner if necessary  Return in about 4 months (around 7/23/2021)  All questions are answered to the patient's satisfaction and understanding    She verbalizes understanding  She is encouraged to call with any further questions or concerns  Portions of the record may have been created with voice recognition software  Occasional wrong word or "sound a like" substitutions may have occurred due to the inherent limitations of voice recognition software  Read the chart carefully and recognize, using context, where substitutions have occurred  Electronically Signed by Chucky Sadler PA-C    ______________________________________________________________________    Chief Complaint: No chief complaint on file  Patient ID: Noel Olvera is a 79 y o  y o  female has a past medical history of Anxiety, COPD (chronic obstructive pulmonary disease) ([address] ), Depression, Disease of thyroid gland, Hypotension, and Wrist fracture  3/23/2021  Patient presents today for follow-up visit  Patient is a 69-year-old female former smoker who quit about 20 years ago with past medical history of asthma, chronic bronchitis, seasonal and environmental allergies, hiatal hernia, GERD, hypertension, obesity  She is here today for follow-up  Overall her breathing is stable, has chronic dyspnea on exertion as well as a chronic cough with clear mucus production  She continues with her Symbicort and Spiriva, occasionally does use her albuterol inhaler, does not like to use her nebulizer  She continues to have daytime fatigue, falls asleep easily during the day if she is sitting watching TV  She continues to have nighttime awakenings, sometimes will have vivid dreams and wake up doing an activity  We have discussed the past doing a sleep study but she has declined previously  She has also noted increased irritability and finds that very small issues bother her much more over the past year  She does not enjoy the activity she used to, she is already on Prozac and does have a follow-up with her PCP tomorrow  Review of Systems   Constitutional: Negative  HENT: Negative  Respiratory: Positive for shortness of breath  Cardiovascular: Negative  Gastrointestinal: Negative  Genitourinary: Negative  Musculoskeletal: Negative  Skin: Negative  Allergic/Immunologic: Negative  Neurological: Negative  Psychiatric/Behavioral: Positive for dysphoric mood and sleep disturbance  The patient is nervous/anxious  Smoking history: She reports that she quit smoking about 21 years ago  She started smoking about 51 years ago  She has a 7 50 pack-year smoking history   She has never used smokeless tobacco     The following portions of the patient's history were reviewed and updated as appropriate: allergies, current medications, past family history, past medical history, past social history, past surgical history and problem list     Immunization History   Administered Date(s) Administered    Influenza Split High Dose Preservative Free IM 09/12/2012    Influenza, seasonal, injectable 1940    Pneumococcal Conjugate 13-Valent 03/23/2016    Pneumococcal Polysaccharide PPV23 1940, 01/01/2014    SARS-CoV-2 / COVID-19 mRNA IM (Moderna) 02/08/2021, 03/09/2021    Tdap [birth year], 09/26/2016    Zoster [birth year]     Current Outpatient Medications   Medication Sig Dispense Refill    albuterol (ACCUNEB) 0 63 MG/3ML nebulizer solution Take 3 mL (0 63 mg total) by nebulization every 6 (six) hours as needed for wheezing or shortness of breath 1080 mL 3    albuterol (PROVENTIL HFA,VENTOLIN HFA) 90 mcg/act inhaler Inhale 1 puff every 6 (six) hours as needed for wheezing 3 Inhaler 5    aspirin 81 mg chewable tablet Chew 81 mg daily      B Complex-Folic Acid (B COMPLEX FORMULA 1) TABS Take by mouth      benzonatate (TESSALON PERLES) 100 mg capsule Take 100 mg by mouth 3 (three) times a day as needed for cough      budesonide-formoterol (SYMBICORT) 160-4 5 mcg/act inhaler Symbicort 160 mcg-4 5 mcg/actuation HFA aerosol inhaler      clotrimazole (MYCELEX) 10 mg james Take 1 tablet (10 mg total) by mouth 5 (five) times a day 70 tablet 0    FLUoxetine (PROzac) 40 MG capsule Take 40 mg by mouth daily      hydrochlorothiazide (HYDRODIURIL) 25 mg tablet Take 25 mg by mouth daily      levothyroxine 50 mcg tablet Take 50 mcg by mouth daily      LORazepam (ATIVAN) 0 5 mg tablet lorazepam 0 5 mg tablet      montelukast (SINGULAIR) 10 mg tablet TAKE 1 TABLET BY MOUTH  DAILY AT BEDTIME 90 tablet 3    pantoprazole (PROTONIX) 40 mg tablet Take 40 mg by mouth daily      simvastatin (ZOCOR) 10 mg tablet Take 10 mg by mouth daily at bedtime      sodium chloride (OCEAN) 0 65 % nasal spray 1 spray into each nostril as needed for congestion or rhinitis 15 mL 3    Zoster Vaccine Live (ZOSTAVAX) 96814 UNT/0 65ML SUSR Zostavax (PF) 19,400 unit/0 65 mL subcutaneous suspension      azelastine (ASTELIN) 0 1 % nasal spray 1 spray into each nostril 2 (two) times a day Use in each nostril as directed 1 Bottle 3     No current facility-administered medications for this visit  Allergies: Fruit c [ascorbate]    Objective:  Vitals:    03/23/21 1421   BP: 120/84   Pulse: 76   Temp: 97 7 °F (36 5 °C)   SpO2: 94%   Weight: 84 3 kg (185 lb 12 8 oz)   Height: 5' 2" (1 575 m)   Oxygen Therapy  SpO2: 94 %    Wt Readings from Last 3 Encounters:   03/23/21 84 3 kg (185 lb 12 8 oz)   11/09/20 80 7 kg (178 lb)   06/29/20 82 1 kg (181 lb)     Body mass index is 33 98 kg/m²  Physical Exam  Vitals signs reviewed  Constitutional:       Appearance: Normal appearance  She is obese  She is not ill-appearing  HENT:      Head: Normocephalic and atraumatic  Eyes:      Conjunctiva/sclera: Conjunctivae normal    Neck:      Musculoskeletal: Normal range of motion  Cardiovascular:      Rate and Rhythm: Normal rate and regular rhythm  Pulmonary:      Effort: Pulmonary effort is normal  No respiratory distress  Breath sounds: No wheezing, rhonchi or rales  Abdominal:      General: Abdomen is flat  There is no distension  Musculoskeletal: Normal range of motion  Right lower leg: No edema  Left lower leg: No edema  Skin:     General: Skin is warm and dry  Neurological:      Mental Status: She is alert and oriented to person, place, and time  Psychiatric:         Mood and Affect: Mood normal          Behavior: Behavior normal          Lab Review:   Lab Results   Component Value Date     05/19/2015    K 3 6 03/21/2019    K 4 3 05/19/2015     03/21/2019     05/19/2015    CO2 28 03/21/2019    CO2 29 8 05/19/2015    BUN 16 03/21/2019    BUN 19 05/19/2015    CREATININE 0 81 03/21/2019    CREATININE 0 7 05/19/2015    GLUCOSE 107 (H) 05/19/2015    CALCIUM 8 8 03/21/2019    CALCIUM 9 1 05/19/2015     Lab Results   Component Value Date    WBC 7 04 03/21/2019    WBC 4 7 05/19/2015    HGB 12 5 03/21/2019    HGB 15 2 05/19/2015    HCT 37 7 03/21/2019    HCT 44 8 05/19/2015    MCV 88 03/21/2019    MCV 89 05/19/2015     03/21/2019     05/19/2015       Diagnostics:    none pertinent  Office Spirometry Results:     ESS:    No results found

## 2021-04-05 ENCOUNTER — TELEPHONE (OUTPATIENT)
Dept: PULMONOLOGY | Facility: CLINIC | Age: 81
End: 2021-04-05

## 2021-04-05 DIAGNOSIS — J44.9 ASTHMA WITH COPD (HCC): ICD-10-CM

## 2021-04-05 RX ORDER — MONTELUKAST SODIUM 10 MG/1
10 TABLET ORAL
Qty: 90 TABLET | Refills: 3 | Status: SHIPPED | OUTPATIENT
Start: 2021-04-05

## 2021-04-05 NOTE — TELEPHONE ENCOUNTER
I refilled her Singulair  I would have her try using the albuterol inhaler or nebulizer when she has the cough   She can take over the counter cough syrup like Robitussin or Delsym but I would try the albuterol first

## 2021-04-23 ENCOUNTER — HOSPITAL ENCOUNTER (OUTPATIENT)
Dept: SLEEP CENTER | Facility: CLINIC | Age: 81
Discharge: HOME | End: 2021-04-23
Payer: MEDICARE

## 2021-04-23 DIAGNOSIS — G47.33 OSA (OBSTRUCTIVE SLEEP APNEA): ICD-10-CM

## 2021-04-23 PROCEDURE — 95810 POLYSOM 6/> YRS 4/> PARAM: CPT

## 2021-04-23 PROCEDURE — 95810 POLYSOM 6/> YRS 4/> PARAM: CPT | Performed by: INTERNAL MEDICINE

## 2021-04-24 NOTE — PROGRESS NOTES
Sleep Study Documentation    Pre-Sleep Study       Sleep testing procedure explained to patient:YES    Patient napped prior to study:YES- less than 30 minutes  Napped after 2PM: no    Caffeine:Dayshift worker after 12PM   Caffeine use:YES- tea  6 to 18 ounces    Alcohol:Dayshift workers after 5PM: Alcohol use:NO    Typical day for patient:YES       Study Documentation    Sleep Study Indications: excessive daytime sleepiness, BMI>30, unrefreshing sleep    Sleep Study: Diagnostic   Snore: Moderate  Supplemental O2: no    O2 flow rate (L/min) range n/a  O2 flow rate (L/min) final n/a  Minimum SaO2 84%  Baseline SaO2 97%        Mode of Therapy:n/a    EKG abnormalities: no     EEG abnormalities: no    Sleep Study Recorded < 2 hours: N/A    Sleep Study Recorded > 2 hours but incomplete study: N/A    Sleep Study Recorded 6 hours but no sleep obtained: NO    Patient classification: retired       Post-Sleep Study    Medication used at bedtime or during sleep study:YES other prescription medications    Patient reports time it took to fall asleep:20 to 30 minutes    Patient reports waking up during study:3 or more times  Patient reports returning to sleep without difficulty  Patient reports sleeping 4 to 6 hours with dreaming  Patient reports sleep during study:better than usual    Patient rated sleepiness: Somewhat sleepy or tired    PAP treatment:no

## 2021-04-29 ENCOUNTER — TELEPHONE (OUTPATIENT)
Dept: PULMONOLOGY | Facility: CLINIC | Age: 81
End: 2021-04-29

## 2021-04-29 NOTE — TELEPHONE ENCOUNTER
Patient is looking for sleep diagnostic results  Can you please call when you have a moment? Thank you

## 2021-05-03 ENCOUNTER — DOCUMENTATION (OUTPATIENT)
Dept: PULMONOLOGY | Facility: CLINIC | Age: 81
End: 2021-05-03

## 2021-05-03 DIAGNOSIS — G47.33 OSA (OBSTRUCTIVE SLEEP APNEA): Primary | ICD-10-CM

## 2021-05-14 ENCOUNTER — TELEPHONE (OUTPATIENT)
Dept: PULMONOLOGY | Facility: CLINIC | Age: 81
End: 2021-05-14

## 2021-05-20 ENCOUNTER — EVALUATION (OUTPATIENT)
Dept: PHYSICAL THERAPY | Facility: CLINIC | Age: 81
End: 2021-05-20
Payer: MEDICARE

## 2021-05-20 ENCOUNTER — TRANSCRIBE ORDERS (OUTPATIENT)
Dept: PHYSICAL THERAPY | Facility: CLINIC | Age: 81
End: 2021-05-20

## 2021-05-20 DIAGNOSIS — G89.29 CHRONIC BILATERAL LOW BACK PAIN WITHOUT SCIATICA: Primary | ICD-10-CM

## 2021-05-20 DIAGNOSIS — M54.50 CHRONIC BILATERAL LOW BACK PAIN WITHOUT SCIATICA: Primary | ICD-10-CM

## 2021-05-20 PROCEDURE — 97535 SELF CARE MNGMENT TRAINING: CPT | Performed by: PHYSICAL THERAPIST

## 2021-05-20 PROCEDURE — 97161 PT EVAL LOW COMPLEX 20 MIN: CPT | Performed by: PHYSICAL THERAPIST

## 2021-05-20 NOTE — LETTER
May 24, 2021    Desiree Dugan MD  [address] [facility] [address]    Patient: Lorena Arriaga   YOB: 1940   Date of Visit: 2021     Encounter Diagnosis     ICD-10-CM    1  Chronic bilateral low back pain without sciatica  M54 5     G89 29        Dear Dr Rachel Lr: Thank you for your recent referral of Lorena Arriaga  Please review the attached evaluation summary from [facility] recent visit  Please verify that you agree with the plan of care by signing the attached order  If you have any questions or concerns, please do not hesitate to call  I sincerely appreciate the opportunity to share in the care of one of your patients and hope to have another opportunity to work with you in the near future  Sincerely,    Shikha Sanderson, PT      Referring Provider:      I certify that I have read the below Plan of Care and certify the need for these services furnished under this plan of treatment while under my care  Desiree Dugan MD  [address] [facility] [address]  Via Fax: [phone]          PT Evaluation     Today's date: 2021  Patient name: Lorena Arriaga  : 1940  MRN: [id number]  Referring provider: Cheko Arechiga MD  Dx:   Encounter Diagnosis     ICD-10-CM    1  Chronic bilateral low back pain without sciatica  M54 5     G89 29        Start Time: 1400  Stop Time: 1440  Total time in clinic (min): 40 minutes    Assessment  Assessment details: Pt presents with c/c of bilateral low back pain  PT notes the patient with decreased ROM in the lumbar spine and decreased mm strength of BLEs, L>R  No reproduction of symptoms noted with repeated motion testing of the lumbar spine  No TTP in the lumbar region  Discussed findings of evaluation with the patient, current limitations, and POC to address deficits   Pt would benefit from course of skilled therapy to decrease symptoms and restore normal functional level  Prognosis is good with adherence to skilled PT 2-3x/wk and compliance to HEP  Based upon examination, no other referral appears necessary at this time  Impairments: abnormal or restricted ROM, activity intolerance, impaired balance, impaired physical strength, lacks appropriate home exercise program, pain with function and poor posture   Understanding of Dx/Px/POC: good   Prognosis: good    Goals  Short Term Goals  1  Pt will decrease pain in lumbar spine 25-50% in 4 wks   2  Pt will improve L-spine ROM to WNL in 4 wks   3  Pt will be independent with HEP in 4 wks     Long Term Goals  1  Pt will decrease pain in lumbar spine % in 8 wks   2  Pt will improve LE mm strength to 5/5 t/o in 8 wks   3  Pt will demonstrate improved postural awareness and control in 8 wks     Plan  Patient would benefit from: PT eval and skilled physical therapy  Referral necessary: No  Planned modality interventions: cryotherapy and thermotherapy: hydrocollator packs  Planned therapy interventions: abdominal trunk stabilization, balance, flexibility, functional ROM exercises, graded exercise, home exercise program, joint mobilization, manual therapy, neuromuscular re-education, patient education, postural training, strengthening, stretching and therapeutic exercise  Frequency: 2x week  Duration in weeks: 12  Treatment plan discussed with: patient        Subjective Evaluation    History of Present Illness  Mechanism of injury: Pt presents with c/c of low back pain  Reports symptoms only present after sitting for long period of time, approx 2-4 hrs  Pain across the low back  No radicular symptoms noted  Pain is relieved with lying or activity, but pt notes if she returns to sitting, pain returns after about 15-20 minutes  Notes some weakness in the left knee when waking up in the morning, but this improves with activity  Notes hx of spinal surgery with "clip" in the low back to relieve radicular symptoms   No current report of radicular symptoms  Pt feels that she has decreased activity levels overall recently  Pt had x-ray of low back at doctor visit, no change to previous imaging  Currently presents with orders for OPPT       Quality of life: good    Pain  Current pain ratin  At best pain ratin  At worst pain ratin  Location: Low Back   Quality: dull ache and tight  Relieving factors: medications, ice and heat  Aggravating factors: sitting  Progression: no change      Diagnostic Tests  X-ray: abnormal  Treatments  Current treatment: physical therapy  Patient Goals  Patient goals for therapy: decreased pain, increased motion, increased strength and return to sport/leisure activities  Patient goal: return to playing board games        Objective     Concurrent Complaints  Negative for night pain and disturbed sleep    Postural Observations  Seated posture: poor  Standing posture: fair  Correction of posture: makes symptoms better        Active Range of Motion     Lumbar   Flexion:  Restriction level: minimal  Extension:  Restriction level: moderate  Left lateral flexion:  Restriction level: minimal  Right lateral flexion:  Restriction level: minimal  Left rotation:  Restriction level: minimal  Right rotation:  Restriction level: minimal  Mechanical Assessment    Cervical      Thoracic      Lumbar    Sitting flexion: repeated movements  Pain location: no change  Standing extension: repeated movements  Pain location: no change  Left Sidegliding: repeated movements  Pain location: no change  Right sidegliding: repeated movements  Pain location: no change    Strength/Myotome Testing     Left Hip   Planes of Motion   Flexion: 4  Extension: 4+  Abduction: 4+  Adduction: 4+  External rotation: 4  Internal rotation: 4    Right Hip   Planes of Motion   Flexion: 4  Extension: 4+  Abduction: 4+  Adduction: 4+  External rotation: 4  Internal rotation: 4    Left Knee   Flexion: 4  Extension: 4    Right Knee   Flexion: 4+  Extension: 4+    Additional Strength Details  No pain reported with resisted MMT       Flowsheet Rows      Most Recent Value   PT/OT G-Codes   Current Score  62   Projected Score  60                Precautions Hx of B/L TKA, COPD       Manuals 5/20/21                       Neuro Re-Ed         EO/EC Foam        Tandem Stance         Postural Correction                                 Ther Ex        NuStep        Mini Squat         St Hip 3-way        TB MTP/LTP        TB T & Y        Bridge         Hip Add        Hip Abd         SLR                HEP Provided        Ther Activity                        Gait Training                        Modalities        MHP/CP Prn

## 2021-05-20 NOTE — PROGRESS NOTES
PT Evaluation     Today's date: 2021  Patient name: Otilia Saldivar  : 1940  MRN: [id number]  Referring provider: Dakota Loyd MD  Dx:   Encounter Diagnosis     ICD-10-CM    1  Chronic bilateral low back pain without sciatica  M54 5     G89 29        Start Time: 1400  Stop Time: 1440  Total time in clinic (min): 40 minutes    Assessment  Assessment details: Pt presents with c/c of bilateral low back pain  PT notes the patient with decreased ROM in the lumbar spine and decreased mm strength of BLEs, L>R  No reproduction of symptoms noted with repeated motion testing of the lumbar spine  No TTP in the lumbar region  Discussed findings of evaluation with the patient, current limitations, and POC to address deficits  Pt would benefit from course of skilled therapy to decrease symptoms and restore normal functional level  Prognosis is good with adherence to skilled PT 2-3x/wk and compliance to HEP  Based upon examination, no other referral appears necessary at this time  Impairments: abnormal or restricted ROM, activity intolerance, impaired balance, impaired physical strength, lacks appropriate home exercise program, pain with function and poor posture   Understanding of Dx/Px/POC: good   Prognosis: good    Goals  Short Term Goals  1  Pt will decrease pain in lumbar spine 25-50% in 4 wks   2  Pt will improve L-spine ROM to WNL in 4 wks   3  Pt will be independent with HEP in 4 wks     Long Term Goals  1  Pt will decrease pain in lumbar spine % in 8 wks   2  Pt will improve LE mm strength to 5/5 t/o in 8 wks   3   Pt will demonstrate improved postural awareness and control in 8 wks     Plan  Patient would benefit from: PT eval and skilled physical therapy  Referral necessary: No  Planned modality interventions: cryotherapy and thermotherapy: hydrocollator packs  Planned therapy interventions: abdominal trunk stabilization, balance, flexibility, functional ROM exercises, graded exercise, home exercise program, joint mobilization, manual therapy, neuromuscular re-education, patient education, postural training, strengthening, stretching and therapeutic exercise  Frequency: 2x week  Duration in weeks: 12  Treatment plan discussed with: patient        Subjective Evaluation    History of Present Illness  Mechanism of injury: Pt presents with c/c of low back pain  Reports symptoms only present after sitting for long period of time, approx 2-4 hrs  Pain across the low back  No radicular symptoms noted  Pain is relieved with lying or activity, but pt notes if she returns to sitting, pain returns after about 15-20 minutes  Notes some weakness in the left knee when waking up in the morning, but this improves with activity  Notes hx of spinal surgery with "clip" in the low back to relieve radicular symptoms  No current report of radicular symptoms  Pt feels that she has decreased activity levels overall recently  Pt had x-ray of low back at doctor visit, no change to previous imaging  Currently presents with orders for OPPT       Quality of life: good    Pain  Current pain ratin  At best pain ratin  At worst pain ratin  Location: Low Back   Quality: dull ache and tight  Relieving factors: medications, ice and heat  Aggravating factors: sitting  Progression: no change      Diagnostic Tests  X-ray: abnormal  Treatments  Current treatment: physical therapy  Patient Goals  Patient goals for therapy: decreased pain, increased motion, increased strength and return to sport/leisure activities  Patient goal: return to playing board games        Objective     Concurrent Complaints  Negative for night pain and disturbed sleep    Postural Observations  Seated posture: poor  Standing posture: fair  Correction of posture: makes symptoms better        Active Range of Motion     Lumbar   Flexion:  Restriction level: minimal  Extension:  Restriction level: moderate  Left lateral flexion:  Restriction level: minimal  Right lateral flexion:  Restriction level: minimal  Left rotation:  Restriction level: minimal  Right rotation:  Restriction level: minimal  Mechanical Assessment    Cervical      Thoracic      Lumbar    Sitting flexion: repeated movements  Pain location: no change  Standing extension: repeated movements  Pain location: no change  Left Sidegliding: repeated movements  Pain location: no change  Right sidegliding: repeated movements  Pain location: no change    Strength/Myotome Testing     Left Hip   Planes of Motion   Flexion: 4  Extension: 4+  Abduction: 4+  Adduction: 4+  External rotation: 4  Internal rotation: 4    Right Hip   Planes of Motion   Flexion: 4  Extension: 4+  Abduction: 4+  Adduction: 4+  External rotation: 4  Internal rotation: 4    Left Knee   Flexion: 4  Extension: 4    Right Knee   Flexion: 4+  Extension: 4+    Additional Strength Details  No pain reported with resisted MMT       Flowsheet Rows      Most Recent Value   PT/OT G-Codes   Current Score  62   Projected Score  60                Precautions Hx of B/L TKA, COPD       Manuals 5/20/21                       Neuro Re-Ed         EO/EC Foam        Tandem Stance         Postural Correction                                 Ther Ex        NuStep        Mini Squat         St Hip 3-way        TB MTP/LTP        TB T & Y        Bridge         Hip Add        Hip Abd         SLR                HEP Provided        Ther Activity                        Gait Training                        Modalities        MHP/CP Prn

## 2021-05-24 ENCOUNTER — OFFICE VISIT (OUTPATIENT)
Dept: PHYSICAL THERAPY | Facility: CLINIC | Age: 81
End: 2021-05-24
Payer: MEDICARE

## 2021-05-24 DIAGNOSIS — G89.29 CHRONIC BILATERAL LOW BACK PAIN WITHOUT SCIATICA: Primary | ICD-10-CM

## 2021-05-24 DIAGNOSIS — M54.50 CHRONIC BILATERAL LOW BACK PAIN WITHOUT SCIATICA: Primary | ICD-10-CM

## 2021-05-24 PROCEDURE — 97110 THERAPEUTIC EXERCISES: CPT | Performed by: PHYSICAL THERAPIST

## 2021-05-24 NOTE — PROGRESS NOTES
Daily Note     Today's date: 2021  Patient name: Lorena Arriaga  : 1940  MRN: [id number]  Referring provider: Cheko Arehciga MD  Dx:   Encounter Diagnosis     ICD-10-CM    1  Chronic bilateral low back pain without sciatica  M54 5     G89 29        Start Time: 1430  Stop Time: 1515  Total time in clinic (min): 45 minutes    Subjective: Pt reports she had some discomfort while doing laundry, pain decreased when she stopped activity  No difficulty reported with HEP  Objective: See treatment diary below      Assessment: Initiated TE program  Tolerated treatment well  Muscular fatigue noted with standing exercises, no pain  Occasional cuing required for proper exercise technique  Continue to progress as tolerated  Patient would benefit from continued PT      Plan: Continue per plan of care           Precautions Hx of B/L TKA, COPD       Manuals 21                      Neuro Re-Ed         EO/EC Foam        Tandem Stance         Postural Correction                                 Ther Ex        NuStep  L3 12'      Mini Squat   20x       St Hip 3-way  20x ea B/L      TB MTP/LTP  20x ea Green      TB T & Y  20x ea Red       Bridge   20x 2-3" hd       Hip Add  20x 2-3" hd       Hip Abd   20x 2-3" hd   Green TB      SLR                HEP Provided        Ther Activity                        Gait Training                        Modalities        MHP/CP Prn

## 2021-05-27 ENCOUNTER — OFFICE VISIT (OUTPATIENT)
Dept: PHYSICAL THERAPY | Facility: CLINIC | Age: 81
End: 2021-05-27
Payer: MEDICARE

## 2021-05-27 DIAGNOSIS — M54.50 CHRONIC BILATERAL LOW BACK PAIN WITHOUT SCIATICA: Primary | ICD-10-CM

## 2021-05-27 DIAGNOSIS — G89.29 CHRONIC BILATERAL LOW BACK PAIN WITHOUT SCIATICA: Primary | ICD-10-CM

## 2021-05-27 PROCEDURE — 97110 THERAPEUTIC EXERCISES: CPT | Performed by: PHYSICAL THERAPIST

## 2021-05-27 NOTE — PROGRESS NOTES
Daily Note     Today's date: 2021  Patient name: Shahnaz Caldwell  : 1940  MRN: [id number]  Referring provider: Indira Sullivan MD  Dx:   Encounter Diagnosis     ICD-10-CM    1  Chronic bilateral low back pain without sciatica  M54 5     G89 29        Start Time: 1430  Stop Time: 1515  Total time in clinic (min): 45 minutes    Subjective: Pt reported some soreness in the left knee and low back following last treatment, resolved within one day  Objective: See treatment diary below      Assessment: Tolerated treatment well  Occasional cuing required for proper exercise technique  No pain reported per pt  Continue to progress as tolerated  Patient would benefit from continued PT      Plan: Continue per plan of care           Precautions Hx of B/L TKA, COPD       Manuals 21                     Neuro Re-Ed         EO/EC Foam        Tandem Stance         Postural Correction                                 Ther Ex        NuStep  L3 12' L3 12'      Mini Squat   20x  20x      St Hip 3-way  20x ea B/L 20x ea B/L     TB MTP/LTP  20x ea Green 25x Green      TB T & Y  20x ea Red  20x Red      Bridge   20x 2-3" hd  20x 2-3" hd      Hip Add  20x 2-3" hd  20x 2-3" hd      Hip Abd   20x 2-3" hd   Green TB 20x 2-3" hd   Green TB      SLR   2x10 Flex              HEP Provided        Ther Activity                        Gait Training                        Modalities        MHP/CP Prn

## 2021-06-03 ENCOUNTER — OFFICE VISIT (OUTPATIENT)
Dept: PHYSICAL THERAPY | Facility: CLINIC | Age: 81
End: 2021-06-03
Payer: MEDICARE

## 2021-06-03 DIAGNOSIS — M54.50 CHRONIC BILATERAL LOW BACK PAIN WITHOUT SCIATICA: Primary | ICD-10-CM

## 2021-06-03 DIAGNOSIS — G89.29 CHRONIC BILATERAL LOW BACK PAIN WITHOUT SCIATICA: Primary | ICD-10-CM

## 2021-06-03 PROCEDURE — 97110 THERAPEUTIC EXERCISES: CPT

## 2021-06-03 NOTE — PROGRESS NOTES
Daily Note     Today's date: 6/3/2021  Patient name: Shaista Pa  : 1940  MRN: [id number]  Referring provider: Kymberly Fu PA-C  Dx:   Encounter Diagnosis     ICD-10-CM    1  Chronic bilateral low back pain without sciatica  M54 5     G89 29                   Subjective: The patient states that she is doing alright  Objective: See treatment diary below      Assessment: Tolerated treatment well  Patient exhibited good technique with therapeutic exercises and would benefit from continued PT  Added in B LE strengthening exercises  No discomfort with new exercises  Progress as able  Plan: Continue per plan of care           Precautions Hx of B/L TKA, COPD       Manuals 5/20/21 5/24/21 5/27/21 6/3/21                    Neuro Re-Ed         EO/EC Foam        Tandem Stance         Postural Correction                                 Ther Ex        NuStep  L3 12' L3 12'  L3 12'    Mini Squat   20x  20x  20x    St Hip 3-way  20x ea B/L 20x ea B/L 20x B/L    TB MTP/LTP  20x ea Green 25x Green  30x Green    TB T & Y  20x ea Red  20x Red  20x Red    Bridge   20x 2-3" hd  20x 2-3" hd  30x 3"    Hip Add  20x 2-3" hd  20x 2-3" hd  30x 5"    Hip Abd   20x 2-3" hd   Green TB 20x 2-3" hd   Green TB  30x 3" Green TBand    SLR   2x10 Flex  20x Flex    Leg Press    10# 30x    Leg Curl    10# 20x    Leg Ext    10# 30x    HEP Provided        Ther Activity                        Gait Training                        Modalities        MHP/CP Prn

## 2021-06-07 ENCOUNTER — OFFICE VISIT (OUTPATIENT)
Dept: PHYSICAL THERAPY | Facility: CLINIC | Age: 81
End: 2021-06-07
Payer: MEDICARE

## 2021-06-07 DIAGNOSIS — M54.50 CHRONIC BILATERAL LOW BACK PAIN WITHOUT SCIATICA: Primary | ICD-10-CM

## 2021-06-07 DIAGNOSIS — G89.29 CHRONIC BILATERAL LOW BACK PAIN WITHOUT SCIATICA: Primary | ICD-10-CM

## 2021-06-07 PROCEDURE — 97110 THERAPEUTIC EXERCISES: CPT

## 2021-06-07 NOTE — PROGRESS NOTES
Daily Note     Today's date: 2021  Patient name: Shahnaz Caldwell  : 1940  MRN: [id number]  Referring provider: Don Morris PA-C  Dx:   Encounter Diagnosis     ICD-10-CM    1  Chronic bilateral low back pain without sciatica  M54 5     G89 29                   Subjective: The patient states that she is doing much better  Objective: See treatment diary below      Assessment: Tolerated treatment well  Patient exhibited good technique with therapeutic exercises and would benefit from continued PT  The patient was able to increase her reps with exercises  No difficulty  Plan: Continue per plan of care           Precautions Hx of B/L TKA, COPD       Manuals 5/20/21 5/24/21 5/27/21 6/3/21 6/7/21                   Neuro Re-Ed         EO/EC Foam        Tandem Stance         Postural Correction                                 Ther Ex        NuStep  L3 12' L3 12'  L3 12' L3 12'   Mini Squat   20x  20x  20x 30x   St Hip 3-way  20x ea B/L 20x ea B/L 20x B/L 30x   TB MTP/LTP  20x ea Green 25x Green  30x Green 30x Green   TB T & Y  20x ea Red  20x Red  20x Red 30x Red   Bridge   20x 2-3" hd  20x 2-3" hd  30x 3" 30x 3"   Hip Add  20x 2-3" hd  20x 2-3" hd  30x 5" 30x 5"   Hip Abd   20x 2-3" hd   Green TB 20x 2-3" hd   Green TB  30x 3" Green TBand 30x Green TBand   SLR   2x10 Flex  20x Flex 20x Flex   Leg Press    10# 30x 10# 30x   Leg Curl    10# 20x 15# 30x   Leg Ext    10# 30x 10# 30x   HEP Provided        Ther Activity                        Gait Training                        Modalities        MHP/CP Prn

## 2021-06-09 ENCOUNTER — APPOINTMENT (OUTPATIENT)
Dept: PHYSICAL THERAPY | Facility: CLINIC | Age: 81
End: 2021-06-09
Payer: MEDICARE

## 2021-06-10 ENCOUNTER — APPOINTMENT (OUTPATIENT)
Dept: PHYSICAL THERAPY | Facility: CLINIC | Age: 81
End: 2021-06-10
Payer: MEDICARE

## 2021-06-11 ENCOUNTER — OFFICE VISIT (OUTPATIENT)
Dept: PHYSICAL THERAPY | Facility: CLINIC | Age: 81
End: 2021-06-11
Payer: MEDICARE

## 2021-06-11 DIAGNOSIS — M54.50 CHRONIC BILATERAL LOW BACK PAIN WITHOUT SCIATICA: Primary | ICD-10-CM

## 2021-06-11 DIAGNOSIS — G89.29 CHRONIC BILATERAL LOW BACK PAIN WITHOUT SCIATICA: Primary | ICD-10-CM

## 2021-06-11 PROCEDURE — 97110 THERAPEUTIC EXERCISES: CPT

## 2021-06-11 NOTE — PROGRESS NOTES
Daily Note     Today's date: 2021  Patient name: Josh Monteiro  : 1940  MRN: [id number]  Referring provider: Deb Sanchez PA-C  Dx:   Encounter Diagnosis     ICD-10-CM    1  Chronic bilateral low back pain without sciatica  M54 5     G89 29                   Subjective: The patient reports that she is doing well today with no complaints of pain  Objective: See treatment diary below      Assessment: Tolerated treatment well  Continued to focus on strengthening this session  She fatigued most with tband Y's today with minimal cuing needed to avoid any UT compensation  Overall pt is making good progress towards her long term goals  Patient exhibited good technique with therapeutic exercises and would benefit from continued PT  Plan: Continue per plan of care           Precautions Hx of B/L TKA, COPD       Manuals 6/11/21 5/24/21 5/27/21 6/3/21 6/7/21                   Neuro Re-Ed         EO/EC Foam        Tandem Stance         Postural Correction                                 Ther Ex        NuStep L4 12' L3 12' L3 12'  L3 12' L3 12'   Mini Squat  30x 20x  20x  20x 30x   St Hip 3-way 30x b/l 20x ea B/L 20x ea B/L 20x B/L 30x   TB MTP/LTP 30x GTB 20x ea Green 25x Green  30x Green 30x Green   TB T & Y 30x Red 20x ea Red  20x Red  20x Red 30x Red   Bridge  30x 3" 20x 2-3" hd  20x 2-3" hd  30x 3" 30x 3"   Hip Add 30x5" 20x 2-3" hd  20x 2-3" hd  30x 5" 30x 5"   Hip Abd  30x Green TB 20x 2-3" hd   Green TB 20x 2-3" hd   Green TB  30x 3" Green TBand 30x Green TBand   SLR 20x Flex  2x10 Flex  20x Flex 20x Flex   Leg Press 10# 30x   10# 30x 10# 30x   Leg Curl 15# 30x   10# 20x 15# 30x   Leg Ext 10# 30x   10# 30x 10# 30x   HEP        Ther Activity                        Gait Training                        Modalities        MHP/CP

## 2021-06-16 ENCOUNTER — OFFICE VISIT (OUTPATIENT)
Dept: PHYSICAL THERAPY | Facility: CLINIC | Age: 81
End: 2021-06-16
Payer: MEDICARE

## 2021-06-16 DIAGNOSIS — M54.50 CHRONIC BILATERAL LOW BACK PAIN WITHOUT SCIATICA: Primary | ICD-10-CM

## 2021-06-16 DIAGNOSIS — G89.29 CHRONIC BILATERAL LOW BACK PAIN WITHOUT SCIATICA: Primary | ICD-10-CM

## 2021-06-16 PROCEDURE — 97110 THERAPEUTIC EXERCISES: CPT | Performed by: PHYSICAL THERAPIST

## 2021-06-16 NOTE — PROGRESS NOTES
Daily Note     Today's date: 2021  Patient name: Taylor Jimenez  : 1940  MRN: [id number]  Referring provider: Liz Keith PA-C  Dx:   Encounter Diagnosis     ICD-10-CM    1  Chronic bilateral low back pain without sciatica  M54 5     G89 29        Start Time: 1100  Stop Time: 1150  Total time in clinic (min): 50 minutes    Subjective: Pt reports discomfort in the left knee following last treatment that lasted a few days  Objective: See treatment diary below      Assessment: Tolerated treatment well  Modified squats to reduce knee pain, however, pt able to perform 20 reps  No pain reported with other exercises  Occasional cuing required for proper exercise technique  Progress as tolerated  Patient would benefit from continued PT      Plan: Continue per plan of care           Precautions Hx of B/L TKA, COPD       Manuals 6/11/21 6/16/21 5/27/21 6/3/21 6/7/21                   Neuro Re-Ed         EO/EC Foam        Tandem Stance         Postural Correction                                 Ther Ex        NuStep L4 12' L4 12'  L3 12'  L3 12' L3 12'   Mini Squat  30x 20x  20x  20x 30x   St Hip 3-way 30x b/l 30x B/L 20x ea B/L 20x B/L 30x   TB MTP/LTP 30x GTB 30x Green  25x Green  30x Green 30x Green   TB T & Y 30x Red 30x Red  20x Red  20x Red 30x Red   Bridge  30x 3" 30x 3" 20x 2-3" hd  30x 3" 30x 3"   Hip Add 30x5" 30x 5" 20x 2-3" hd  30x 5" 30x 5"   Hip Abd  30x Green TB 30x Green TB 20x 2-3" hd   Green TB  30x 3" Green TBand 30x Green TBand   SLR 20x Flex NV 2x10 Flex  20x Flex 20x Flex   Leg Press 10# 30x 30x 10#  10# 30x 10# 30x   Leg Curl 15# 30x 30x 15#  10# 20x 15# 30x   Leg Ext 10# 30x 30x 10#   10# 30x 10# 30x   HEP        Ther Activity                        Gait Training                        Modalities        MHP/CP

## 2021-06-22 ENCOUNTER — OFFICE VISIT (OUTPATIENT)
Dept: PHYSICAL THERAPY | Facility: CLINIC | Age: 81
End: 2021-06-22
Payer: MEDICARE

## 2021-06-22 DIAGNOSIS — G89.29 CHRONIC BILATERAL LOW BACK PAIN WITHOUT SCIATICA: Primary | ICD-10-CM

## 2021-06-22 DIAGNOSIS — M54.50 CHRONIC BILATERAL LOW BACK PAIN WITHOUT SCIATICA: Primary | ICD-10-CM

## 2021-06-22 PROCEDURE — 97110 THERAPEUTIC EXERCISES: CPT

## 2021-06-22 NOTE — PROGRESS NOTES
Daily Note     Today's date: 2021  Patient name: Mimi Cuellar  : 1940  MRN: [id number]  Referring provider: Gladys Mart PA-C  Dx:   Encounter Diagnosis     ICD-10-CM    1  Chronic bilateral low back pain without sciatica  M54 5     G89 29                   Subjective: The patient states that she is doing ok, but does feel a little stiff today  Objective: See treatment diary below      Assessment: Tolerated treatment well  Patient exhibited good technique with therapeutic exercises and would benefit from continued PT  The patient reported feeling less stiff post therapy session  Plan: Continue per plan of care           Precautions Hx of B/L TKA, COPD       Manuals 6/11/21 6/16/21 6/22/21 6/3/21 6/7/21                   Neuro Re-Ed         EO/EC Foam        Tandem Stance         Postural Correction                                 Ther Ex        NuStep L4 12' L4 12'  L4 12'  L3 12' L3 12'   Mini Squat  30x 20x  NV 20x 30x   St Hip 3-way 30x b/l 30x B/L NV 20x B/L 30x   TB MTP/LTP 30x GTB 30x Green  30x Green  30x Green 30x Green   TB T & Y 30x Red 30x Red  30x Red  20x Red 30x Red   Bridge  30x 3" 30x 3" 30x 2-3" hd  30x 3" 30x 3"   Hip Add 30x5" 30x 5" 30x 2-3" hd  30x 5" 30x 5"   Hip Abd  30x Green TB 30x Green TB 30x 2-3" hd   Green TB  30x 3" Green TBand 30x Green TBand   SLR 20x Flex NV 3x10 Flex  20x Flex 20x Flex   Leg Press 10# 30x 30x 10# 30x 10# 10# 30x 10# 30x   Leg Curl 15# 30x 30x 15# 30x 15# 10# 20x 15# 30x   Leg Ext 10# 30x 30x 10#  30x 10# 10# 30x 10# 30x   HEP        Ther Activity                        Gait Training                        Modalities        MHP/CP

## 2021-06-23 ENCOUNTER — OFFICE VISIT (OUTPATIENT)
Dept: PHYSICAL THERAPY | Facility: CLINIC | Age: 81
End: 2021-06-23
Payer: MEDICARE

## 2021-06-23 DIAGNOSIS — G89.29 CHRONIC BILATERAL LOW BACK PAIN WITHOUT SCIATICA: Primary | ICD-10-CM

## 2021-06-23 DIAGNOSIS — M54.50 CHRONIC BILATERAL LOW BACK PAIN WITHOUT SCIATICA: Primary | ICD-10-CM

## 2021-06-23 PROCEDURE — 97110 THERAPEUTIC EXERCISES: CPT

## 2021-06-23 NOTE — PROGRESS NOTES
Daily Note     Today's date: 2021  Patient name: Jerry Marsh  : 1940  MRN: [id number]  Referring provider: Bobbi Cuevas PA-C  Dx:   Encounter Diagnosis     ICD-10-CM    1  Chronic bilateral low back pain without sciatica  M54 5     G89 29                   Subjective: The patient states that she is doing better than yesterday  Objective: See treatment diary below      Assessment: Tolerated treatment well  Patient exhibited good technique with therapeutic exercises and would benefit from continued PT  The patent did have fatigue post therapy session  Increase resistance next visit  Plan: Continue per plan of care           Precautions Hx of B/L TKA, COPD       Manuals 21                   Neuro Re-Ed         EO/EC Foam        Tandem Stance         Postural Correction                                 Ther Ex        NuStep L4 12' L4 12'  L4 12'  L3 12' L3 12'   Mini Squat  30x 20x  NV 20x 30x   St Hip 3-way 30x b/l 30x B/L NV 20x B/L 30x   TB MTP/LTP 30x GTB 30x Green  30x Green  30x Green 30x Green   TB T & Y 30x Red 30x Red  30x Red  20x Red 30x Red   Bridge  30x 3" 30x 3" 30x 2-3" hd  30x 3" 30x 3"   Hip Add 30x5" 30x 5" 30x 2-3" hd  30x 5" 30x 5"   Hip Abd  30x Green TB 30x Green TB 30x 2-3" hd   Green TB  30x 3" Green TBand 30x Green TBand   SLR 20x Flex NV 3x10 Flex  20x Flex 20x Flex   Leg Press 10# 30x 30x 10# 30x 10# 10# 30x 10# 30x   Leg Curl 15# 30x 30x 15# 30x 15# 15# 20x 15# 30x   Leg Ext 10# 30x 30x 10#  30x 10# 10# 30x 10# 30x   HEP        Ther Activity                        Gait Training                        Modalities        MHP/CP

## 2021-06-26 ENCOUNTER — HOSPITAL ENCOUNTER (EMERGENCY)
Facility: HOSPITAL | Age: 81
Discharge: HOME | End: 2021-06-27
Attending: EMERGENCY MEDICINE
Payer: MEDICARE

## 2021-06-26 ENCOUNTER — APPOINTMENT (EMERGENCY)
Dept: RADIOLOGY | Facility: HOSPITAL | Age: 81
End: 2021-06-26
Payer: MEDICARE

## 2021-06-26 DIAGNOSIS — M79.605 LEFT LEG PAIN: Primary | ICD-10-CM

## 2021-06-26 DIAGNOSIS — M54.9 BACK PAIN: ICD-10-CM

## 2021-06-26 PROCEDURE — 72100 X-RAY EXAM L-S SPINE 2/3 VWS: CPT

## 2021-06-26 PROCEDURE — 99283 EMERGENCY DEPT VISIT LOW MDM: CPT

## 2021-06-26 PROCEDURE — 96372 THER/PROPH/DIAG INJ SC/IM: CPT

## 2021-06-26 PROCEDURE — 73502 X-RAY EXAM HIP UNI 2-3 VIEWS: CPT

## 2021-06-26 RX ORDER — KETOROLAC TROMETHAMINE 30 MG/ML
15 INJECTION, SOLUTION INTRAMUSCULAR; INTRAVENOUS ONCE
Status: COMPLETED | OUTPATIENT
Start: 2021-06-26 | End: 2021-06-26

## 2021-06-26 RX ADMIN — KETOROLAC TROMETHAMINE 15 MG: 30 INJECTION, SOLUTION INTRAMUSCULAR at 22:31

## 2021-06-27 VITALS
BODY MASS INDEX: 29.25 KG/M2 | TEMPERATURE: 98.3 F | WEIGHT: 193 LBS | OXYGEN SATURATION: 94 % | HEIGHT: 68 IN | DIASTOLIC BLOOD PRESSURE: 79 MMHG | RESPIRATION RATE: 18 BRPM | HEART RATE: 90 BPM | SYSTOLIC BLOOD PRESSURE: 159 MMHG

## 2021-06-27 PROCEDURE — 99284 EMERGENCY DEPT VISIT MOD MDM: CPT | Performed by: EMERGENCY MEDICINE

## 2021-06-27 RX ORDER — IBUPROFEN 800 MG/1
800 TABLET ORAL 3 TIMES DAILY
Qty: 21 TABLET | Refills: 0 | Status: SHIPPED | OUTPATIENT
Start: 2021-06-27

## 2021-06-27 RX ORDER — PREDNISONE 20 MG/1
40 TABLET ORAL DAILY
Qty: 10 TABLET | Refills: 0 | Status: SHIPPED | OUTPATIENT
Start: 2021-06-27 | End: 2021-07-02

## 2021-06-30 ENCOUNTER — TELEPHONE (OUTPATIENT)
Dept: PULMONOLOGY | Facility: CLINIC | Age: 81
End: 2021-06-30

## 2021-06-30 ENCOUNTER — APPOINTMENT (OUTPATIENT)
Dept: PHYSICAL THERAPY | Facility: CLINIC | Age: 81
End: 2021-06-30
Payer: MEDICARE

## 2021-07-10 NOTE — ED PROVIDER NOTES
History  Chief Complaint   Patient presents with    Leg Pain     Patient reports L leg pain since yesterday  Patient took shoes off the wrong way last night and has had pain ever since  80 yo f presenting to the emergency department for eval of left leg and abck pain  Pt has had left back pain radiating into her left leg and groin since feeling a "pulling" sensation in her sciatic/groin area yesterday while taking her shoes off  Has had aching pain radiating down the leg since  No numbness, weakness, saddle anesthesia or bowel/ bladder incontinence  Prior to Admission Medications   Prescriptions Last Dose Informant Patient Reported? Taking?    B Complex-Folic Acid (B COMPLEX FORMULA 1) TABS   Yes No   Sig: Take by mouth   FLUoxetine (PROzac) 40 MG capsule   Yes No   Sig: Take 40 mg by mouth daily   LORazepam (ATIVAN) 0 5 mg tablet   Yes No   Sig: lorazepam 0 5 mg tablet   Zoster Vaccine Live (ZOSTAVAX) 20668 UNT/0 65ML SUSR   Yes No   Sig: Zostavax (PF) 19,400 unit/0 65 mL subcutaneous suspension   albuterol (ACCUNEB) 0 63 MG/3ML nebulizer solution   No No   Sig: Take 3 mL (0 63 mg total) by nebulization every 6 (six) hours as needed for wheezing or shortness of breath   albuterol (PROVENTIL HFA,VENTOLIN HFA) 90 mcg/act inhaler   No No   Sig: Inhale 1 puff every 6 (six) hours as needed for wheezing   aspirin 81 mg chewable tablet   Yes No   Sig: Chew 81 mg daily   azelastine (ASTELIN) 0 1 % nasal spray   No No   Si spray into each nostril 2 (two) times a day Use in each nostril as directed   benzonatate (TESSALON PERLES) 100 mg capsule   Yes No   Sig: Take 100 mg by mouth 3 (three) times a day as needed for cough   budesonide-formoterol (SYMBICORT) 160-4 5 mcg/act inhaler   Yes No   Sig: Symbicort 160 mcg-4 5 mcg/actuation HFA aerosol inhaler   clotrimazole (MYCELEX) 10 mg james   No No   Sig: Take 1 tablet (10 mg total) by mouth 5 (five) times a day   hydrochlorothiazide (HYDRODIURIL) 25 mg tablet   Yes No   Sig: Take 25 mg by mouth daily   levothyroxine 50 mcg tablet   Yes No   Sig: Take 50 mcg by mouth daily   montelukast (SINGULAIR) 10 mg tablet   No No   Sig: Take 1 tablet (10 mg total) by mouth daily at bedtime   pantoprazole (PROTONIX) 40 mg tablet   Yes No   Sig: Take 40 mg by mouth daily   simvastatin (ZOCOR) 10 mg tablet   Yes No   Sig: Take 10 mg by mouth daily at bedtime   sodium chloride (OCEAN) 0 65 % nasal spray   No No   Si spray into each nostril as needed for congestion or rhinitis      Facility-Administered Medications: None       Past Medical History:   Diagnosis Date    Anxiety     COPD (chronic obstructive pulmonary disease) ([facility] [address] )     Depression     Disease of thyroid gland     Hyperlipidemia     Hypotension     Wrist fracture        Past Surgical History:   Procedure Laterality Date    BACK SURGERY  2017    INCONTINENCE SURGERY      NEPHRECTOMY TRANSPLANTED ORGAN      REPLACEMENT TOTAL KNEE BILATERAL         History reviewed  No pertinent family history  I have reviewed and agree with the history as documented  E-Cigarette/Vaping    E-Cigarette Use Never User      E-Cigarette/Vaping Substances     Social History     Tobacco Use    Smoking status: Former Smoker     Packs/day: 0 25     Years: 30 00     Pack years: 7 50     Start date: 1970     Quit date: 2000     Years since quittin 5    Smokeless tobacco: Never Used   Vaping Use    Vaping Use: Never used   Substance Use Topics    Alcohol use: No    Drug use: No       Review of Systems   Constitutional: Negative for appetite change, chills, fatigue and fever  HENT: Negative for sneezing and sore throat  Eyes: Negative for visual disturbance  Respiratory: Negative for cough, choking, chest tightness, shortness of breath and wheezing  Cardiovascular: Negative for chest pain and palpitations  Gastrointestinal: Negative for abdominal pain, constipation, diarrhea, nausea and vomiting  Genitourinary: Negative for difficulty urinating and dysuria  Musculoskeletal: Positive for arthralgias, back pain and myalgias  Neurological: Negative for dizziness, weakness, light-headedness, numbness and headaches  All other systems reviewed and are negative  Physical Exam  Physical Exam  Vitals and nursing note reviewed  Constitutional:       General: She is not in acute distress  Appearance: She is well-developed  She is not diaphoretic  HENT:      Head: Normocephalic and atraumatic  Eyes:      Pupils: Pupils are equal, round, and reactive to light  Neck:      Vascular: No JVD  Trachea: No tracheal deviation  Cardiovascular:      Rate and Rhythm: Normal rate and regular rhythm  Heart sounds: Normal heart sounds  No murmur heard  No friction rub  No gallop  Pulmonary:      Effort: Pulmonary effort is normal  No respiratory distress  Breath sounds: Normal breath sounds  No wheezing or rales  Abdominal:      General: Bowel sounds are normal  There is no distension  Palpations: Abdomen is soft  Tenderness: There is no abdominal tenderness  There is no guarding or rebound  Musculoskeletal:      Comments: Tenderness over left sciatic distribution  nvi distally   Skin:     General: Skin is warm and dry  Coloration: Skin is not pale  Neurological:      Mental Status: She is alert and oriented to person, place, and time  Cranial Nerves: No cranial nerve deficit  Motor: No abnormal muscle tone     Psychiatric:         Behavior: Behavior normal          Vital Signs  ED Triage Vitals   Temperature Pulse Respirations Blood Pressure SpO2   06/26/21 2128 06/26/21 2128 06/26/21 2128 06/26/21 2128 06/26/21 2128   98 3 °F (36 8 °C) 84 18 143/67 95 %      Temp Source Heart Rate Source Patient Position - Orthostatic VS BP Location FiO2 (%)   06/26/21 2128 06/26/21 2128 06/26/21 2128 06/26/21 2128 --   Oral Monitor Sitting Left arm       Pain Score 06/26/21 2204       8           Vitals:    06/26/21 2128 06/26/21 2204 06/26/21 2300 06/27/21 0025   BP: 143/67 (!) 187/85 137/66 159/79   Pulse: 84 95 85 90   Patient Position - Orthostatic VS: Sitting Lying  Lying         Visual Acuity      ED Medications  Medications   ketorolac (TORADOL) injection 15 mg (15 mg Intramuscular Given 6/26/21 2231)       Diagnostic Studies  Results Reviewed     None                 XR spine lumbar 2 or 3 views injury   Final Result by Sofiya Suazo MD (06/27 1112)      No acute fracture identified  Workstation performed: [id number]         XR hip/pelv 2-3 vws left if performed   Final Result by Sofiya Suazo MD (06/27 1114)      No acute osseous abnormality  Workstation performed: [id number]                    Procedures  Procedures         ED Course                             SBIRT 22yo+      Most Recent Value   SBIRT (22 yo +)   In order to provide better care to our patients, we are screening all of our patients for alcohol and drug use  Would it be okay to ask you these screening questions? No Filed at: 06/26/2021 2203                    MDM  Number of Diagnoses or Management Options  Back pain  Left leg pain  Diagnosis management comments: 80 yo  With left leg and back pain will check xr, give nsaid, reassess    Pt is feeling better, will discharge with comp spine f/u       Disposition  Final diagnoses:   Left leg pain   Back pain     Time reflects when diagnosis was documented in both MDM as applicable and the Disposition within this note     Time User Action Codes Description Comment    6/27/2021 12:35 AM Humberto Kamara Add [M79 605] Left leg pain     6/27/2021 12:35 AM Leoncio Qureshi Add [M54 9] Back pain       ED Disposition     ED Disposition Condition Date/Time Comment    Discharge Stable Sun Jun 27, 2021 12:35 AM [facility] discharge to home/self care              Follow-up Information     Follow up With Specialties Details Why Nic [address] Maribell Lisa MD Internal Medicine   [address]  [phone]            Discharge Medication List as of 6/27/2021 12:35 AM      START taking these medications    Details   predniSONE 20 mg tablet Take 2 tablets (40 mg total) by mouth daily for 5 days, Starting Sun 6/27/2021, Until Fri 7/2/2021, Normal         CONTINUE these medications which have NOT CHANGED    Details   albuterol (ACCUNEB) 0 63 MG/3ML nebulizer solution Take 3 mL (0 63 mg total) by nebulization every 6 (six) hours as needed for wheezing or shortness of breath, Starting Fri 10/2/2020, Normal      albuterol (PROVENTIL HFA,VENTOLIN HFA) 90 mcg/act inhaler Inhale 1 puff every 6 (six) hours as needed for wheezing, Starting Tue 9/29/2020, Normal      aspirin 81 mg chewable tablet Chew 81 mg daily, Historical Med      azelastine (ASTELIN) 0 1 % nasal spray 1 spray into each nostril 2 (two) times a day Use in each nostril as directed, Starting Tue 3/23/2021, Normal      B Complex-Folic Acid (B COMPLEX FORMULA 1) TABS Take by mouth, Historical Med      benzonatate (TESSALON PERLES) 100 mg capsule Take 100 mg by mouth 3 (three) times a day as needed for cough, Historical Med      budesonide-formoterol (SYMBICORT) 160-4 5 mcg/act inhaler Symbicort 160 mcg-4 5 mcg/actuation HFA aerosol inhaler, Historical Med      clotrimazole (MYCELEX) 10 mg james Take 1 tablet (10 mg total) by mouth 5 (five) times a day, Starting Thu 4/4/2019, Normal      FLUoxetine (PROzac) 40 MG capsule Take 40 mg by mouth daily, Historical Med      hydrochlorothiazide (HYDRODIURIL) 25 mg tablet Take 25 mg by mouth daily, Historical Med      levothyroxine 50 mcg tablet Take 50 mcg by mouth daily, Historical Med      LORazepam (ATIVAN) 0 5 mg tablet lorazepam 0 5 mg tablet, Historical Med      montelukast (SINGULAIR) 10 mg tablet Take 1 tablet (10 mg total) by mouth daily at bedtime, Starting Mon 4/5/2021, Normal      pantoprazole (PROTONIX) 40 mg tablet Take 40 mg by mouth daily, Historical Med      simvastatin (ZOCOR) 10 mg tablet Take 10 mg by mouth daily at bedtime, Historical Med      sodium chloride (OCEAN) 0 65 % nasal spray 1 spray into each nostril as needed for congestion or rhinitis, Starting Thu 4/4/2019, Normal      Zoster Vaccine Live (ZOSTAVAX) 16720 UNT/0 65ML SUSR Zostavax (PF) 19,400 unit/0 65 mL subcutaneous suspension, Historical Med           No discharge procedures on file      PDMP Review     None          ED Provider  Electronically Signed by           Tyshawn Domingo MD  07/10/21 6947

## 2021-07-22 NOTE — PROGRESS NOTES
PT Discharge    Today's date: 2021  Patient name: Lorena Arriaga  : 1940  MRN: [id number]  Referring provider: Nakia Love PA-C  Dx:   Encounter Diagnosis     ICD-10-CM    1  Chronic bilateral low back pain without sciatica  M54 5     G89 29        Start Time: 0815  Stop Time: 0900  Total time in clinic (min): 45 minutes    Assessment  Assessment details: Pt presented with c/c of bilateral low back pain  DC 21  Pt seen for 9 total sessions of PT with last visit on   Additional appts cx by the patient after going to ED 2* increased pain in the left LE  Pt advised to contact facility if additional PT services were required  Pt has not contacted facility at this point and will be d/c from PT 2* expiration of PT script  No additional objective measurements taken 2* not returning for formal d/c  Impairments: abnormal or restricted ROM, activity intolerance, impaired balance, impaired physical strength, lacks appropriate home exercise program, pain with function and poor posture   Understanding of Dx/Px/POC: good   Prognosis: good    Goals  Short Term Goals  1  Pt will decrease pain in lumbar spine 25-50% in 4 wks   2  Pt will improve L-spine ROM to WNL in 4 wks   3  Pt will be independent with HEP in 4 wks     Long Term Goals  1  Pt will decrease pain in lumbar spine % in 8 wks   2  Pt will improve LE mm strength to 5/5 t/o in 8 wks   3  Pt will demonstrate improved postural awareness and control in 8 wks     Plan  Referral necessary: No        Subjective Evaluation    History of Present Illness  Mechanism of injury: Pt presents with c/c of low back pain  Reports symptoms only present after sitting for long period of time, approx 2-4 hrs  Pain across the low back  No radicular symptoms noted  Pain is relieved with lying or activity, but pt notes if she returns to sitting, pain returns after about 15-20 minutes   Notes some weakness in the left knee when waking up in the morning, but this improves with activity  Notes hx of spinal surgery with "clip" in the low back to relieve radicular symptoms  No current report of radicular symptoms  Pt feels that she has decreased activity levels overall recently  Pt had x-ray of low back at doctor visit, no change to previous imaging  Currently presents with orders for OPPT       Quality of life: good    Pain  Current pain ratin  At best pain ratin  At worst pain ratin  Location: Low Back   Quality: dull ache and tight  Relieving factors: medications, ice and heat  Aggravating factors: sitting  Progression: no change      Diagnostic Tests  X-ray: abnormal  Treatments  Current treatment: physical therapy  Patient Goals  Patient goals for therapy: decreased pain, increased motion, increased strength and return to sport/leisure activities  Patient goal: return to playing board games        Objective     Concurrent Complaints  Negative for night pain and disturbed sleep    Postural Observations  Seated posture: poor  Standing posture: fair  Correction of posture: makes symptoms better        Active Range of Motion     Lumbar   Flexion:  Restriction level: minimal  Extension:  Restriction level: moderate  Left lateral flexion:  Restriction level: minimal  Right lateral flexion:  Restriction level: minimal  Left rotation:  Restriction level: minimal  Right rotation:  Restriction level: minimal  Mechanical Assessment    Cervical      Thoracic      Lumbar    Sitting flexion: repeated movements  Pain location: no change  Standing extension: repeated movements  Pain location: no change  Left Sidegliding: repeated movements  Pain location: no change  Right sidegliding: repeated movements  Pain location: no change    Strength/Myotome Testing     Left Hip   Planes of Motion   Flexion: 4  Extension: 4+  Abduction: 4+  Adduction: 4+  External rotation: 4  Internal rotation: 4    Right Hip   Planes of Motion   Flexion: 4  Extension: 4+  Abduction: 4+  Adduction: 4+  External rotation: 4  Internal rotation: 4    Left Knee   Flexion: 4  Extension: 4    Right Knee   Flexion: 4+  Extension: 4+    Additional Strength Details  No pain reported with resisted MMT       Flowsheet Rows      Most Recent Value   PT/OT G-Codes   Current Score  70   Projected Score  60

## 2021-07-23 ENCOUNTER — OFFICE VISIT (OUTPATIENT)
Dept: PULMONOLOGY | Facility: CLINIC | Age: 81
End: 2021-07-23
Payer: MEDICARE

## 2021-07-23 VITALS
DIASTOLIC BLOOD PRESSURE: 72 MMHG | SYSTOLIC BLOOD PRESSURE: 122 MMHG | HEART RATE: 51 BPM | OXYGEN SATURATION: 97 % | HEIGHT: 62 IN | WEIGHT: 193 LBS | BODY MASS INDEX: 35.51 KG/M2 | TEMPERATURE: 97.2 F

## 2021-07-23 DIAGNOSIS — J44.9 ASTHMA WITH COPD (CHRONIC OBSTRUCTIVE PULMONARY DISEASE) (HCC): ICD-10-CM

## 2021-07-23 DIAGNOSIS — G47.33 OSA (OBSTRUCTIVE SLEEP APNEA): Primary | ICD-10-CM

## 2021-07-23 PROCEDURE — 99214 OFFICE O/P EST MOD 30 MIN: CPT | Performed by: PHYSICIAN ASSISTANT

## 2021-07-23 RX ORDER — UMECLIDINIUM 62.5 UG/1
1 AEROSOL, POWDER ORAL DAILY
Qty: 30 BLISTER | Refills: 0 | Status: SHIPPED | OUTPATIENT
Start: 2021-07-23 | End: 2021-08-19

## 2021-07-23 NOTE — PROGRESS NOTES
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) ([facility])  -     umeclidinium bromide (Incruse Ellipta) 62 5 mcg/inh AEPB inhaler; Inhale 1 puff daily       Patient is here today for follow-up  She was recently started on a CPAP for her moderate sleep apnea with AHI of 23  She is overall tolerating the CPAP well, did recently receive a new mask as the initial mask was too big  Reviewed her CPAP compliance which shows she is compliant with the CPAP, does show a residual AHI of 9 with a mask leak of 31 minutes  She does feel that she is overall sleeping better, given that she did have an incorrect fit on her mask, will recheck another compliance in 3 months before adjusting any pressures  She continues with her Symbicort twice per day, Singulair, nasal sprays  Continues to have a chronic cough, does usually have some clear mucus production  Will add Incruse to give her the added inhaled anticholinergic to see if this helps with her cough  She will follow-up with us in 3 months or sooner if necessary  Return in about 3 months (around 10/23/2021)  All questions are answered to the patient's satisfaction and understanding  She verbalizes understanding  She is encouraged to call with any further questions or concerns  Portions of the record may have been created with voice recognition software  Occasional wrong word or "sound a like" substitutions may have occurred due to the inherent limitations of voice recognition software  Read the chart carefully and recognize, using context, where substitutions have occurred  Electronically Signed by Ruddy Birch PA-C    ______________________________________________________________________    Chief Complaint: No chief complaint on file        Patient ID: Anna Casillas is a 79 y o  y o  female has a past medical history of Anxiety, COPD (chronic obstructive pulmonary disease) ([facility] [address] ), Depression, Disease of thyroid gland, Hyperlipidemia, Hypotension, and Wrist fracture  7/23/2021  Patient presents today for follow-up visit  Patient is a 80-year-old female former smoker who quit about 20 years ago with past medical history of asthma, chronic bronchitis, seasonal and environmental allergies, hiatal hernia, GERD, hypertension, obesity  She was recently diagnosed with obstructive sleep apnea and was started on a CPAP  She is here today for CPAP follow-up  She is overall tolerating the CPAP well, did have a mask that was too big and it was recently changed  She does find that she is having less nightmares during the night  Does feel generally well rested during the day  She continues on her Symbicort twice per day, not using her albuterol on a regular basis  She continues to have a chronic cough which has been stable for some time  Review of Systems   Constitutional: Negative  HENT: Negative  Respiratory: Positive for cough  Cardiovascular: Negative  Gastrointestinal: Negative  Genitourinary: Negative  Musculoskeletal: Negative  Skin: Negative  Allergic/Immunologic: Negative  Neurological: Negative  Psychiatric/Behavioral: Negative  Smoking history: She reports that she quit smoking about 21 years ago  She started smoking about 51 years ago  She has a 7 50 pack-year smoking history   She has never used smokeless tobacco     The following portions of the patient's history were reviewed and updated as appropriate: allergies, current medications, past family history, past medical history, past social history, past surgical history and problem list     Immunization History   Administered Date(s) Administered    Influenza Split High Dose Preservative Free IM 09/12/2012    Influenza, seasonal, injectable 1940    Pneumococcal Conjugate 13-Valent 03/23/2016    Pneumococcal Polysaccharide PPV23 1940, 01/01/2014  SARS-CoV-2 / COVID-19 mRNA IM (Moderna) 02/08/2021, 03/09/2021    Tdap [birth year] 09/26/2016    Zoster [birth year]     Current Outpatient Medications   Medication Sig Dispense Refill    albuterol (ACCUNEB) 0 63 MG/3ML nebulizer solution Take 3 mL (0 63 mg total) by nebulization every 6 (six) hours as needed for wheezing or shortness of breath 1080 mL 3    albuterol (PROVENTIL HFA,VENTOLIN HFA) 90 mcg/act inhaler Inhale 1 puff every 6 (six) hours as needed for wheezing 3 Inhaler 5    aspirin 81 mg chewable tablet Chew 81 mg daily      azelastine (ASTELIN) 0 1 % nasal spray 1 spray into each nostril 2 (two) times a day Use in each nostril as directed 1 Bottle 3    B Complex-Folic Acid (B COMPLEX FORMULA 1) TABS Take by mouth      benzonatate (TESSALON PERLES) 100 mg capsule Take 100 mg by mouth 3 (three) times a day as needed for cough      budesonide-formoterol (SYMBICORT) 160-4 5 mcg/act inhaler Symbicort 160 mcg-4 5 mcg/actuation HFA aerosol inhaler      clotrimazole (MYCELEX) 10 mg james Take 1 tablet (10 mg total) by mouth 5 (five) times a day 70 tablet 0    FLUoxetine (PROzac) 40 MG capsule Take 40 mg by mouth daily      hydrochlorothiazide (HYDRODIURIL) 25 mg tablet Take 25 mg by mouth daily      ibuprofen (MOTRIN) 800 mg tablet Take 1 tablet (800 mg total) by mouth 3 (three) times a day 21 tablet 0    levothyroxine 50 mcg tablet Take 50 mcg by mouth daily      LORazepam (ATIVAN) 0 5 mg tablet lorazepam 0 5 mg tablet      montelukast (SINGULAIR) 10 mg tablet Take 1 tablet (10 mg total) by mouth daily at bedtime 90 tablet 3    pantoprazole (PROTONIX) 40 mg tablet Take 40 mg by mouth daily      simvastatin (ZOCOR) 10 mg tablet Take 10 mg by mouth daily at bedtime      sodium chloride (OCEAN) 0 65 % nasal spray 1 spray into each nostril as needed for congestion or rhinitis 15 mL 3    Zoster Vaccine Live (ZOSTAVAX) 78575 UNT/0 65ML SUSR Zostavax (PF) 19,400 unit/0 65 mL subcutaneous suspension      umeclidinium bromide (Incruse Ellipta) 62 5 mcg/inh AEPB inhaler Inhale 1 puff daily 30 blister 0     No current facility-administered medications for this visit  Allergies: Fruit c [ascorbate - food allergy] and Seasonal ic [cholestatin]    Objective:  Vitals:    07/23/21 1307   BP: 122/72   Pulse: (!) 51   Temp: (!) 97 2 °F (36 2 °C)   SpO2: 97%   Weight: 87 5 kg (193 lb)   Height: 5' 2" (1 575 m)   Oxygen Therapy  SpO2: 97 %    Wt Readings from Last 3 Encounters:   07/23/21 87 5 kg (193 lb)   06/26/21 87 5 kg (193 lb)   03/23/21 84 3 kg (185 lb 12 8 oz)     Body mass index is 35 3 kg/m²  Physical Exam  Vitals reviewed  Constitutional:       General: She is not in acute distress  Appearance: Normal appearance  She is obese  She is not ill-appearing  HENT:      Head: Normocephalic and atraumatic  Eyes:      Conjunctiva/sclera: Conjunctivae normal    Cardiovascular:      Rate and Rhythm: Normal rate and regular rhythm  Pulmonary:      Effort: Pulmonary effort is normal  No respiratory distress  Breath sounds: Normal breath sounds  No decreased breath sounds, wheezing, rhonchi or rales  Abdominal:      General: Abdomen is flat  There is no distension  Musculoskeletal:         General: Normal range of motion  Cervical back: Normal range of motion  Right lower leg: No edema  Left lower leg: No edema  Skin:     General: Skin is warm and dry  Neurological:      Mental Status: She is alert and oriented to person, place, and time           Lab Review:   Lab Results   Component Value Date     05/19/2015    K 3 6 03/21/2019    K 4 3 05/19/2015     03/21/2019     05/19/2015    CO2 28 03/21/2019    CO2 29 8 05/19/2015    BUN 16 03/21/2019    BUN 19 05/19/2015    CREATININE 0 81 03/21/2019    CREATININE 0 7 05/19/2015    GLUCOSE 107 (H) 05/19/2015    CALCIUM 8 8 03/21/2019    CALCIUM 9 1 05/19/2015     Lab Results   Component Value Date    WBC 7 04 03/21/2019    WBC 4 7 05/19/2015    HGB 12 5 03/21/2019    HGB 15 2 05/19/2015    HCT 37 7 03/21/2019    HCT 44 8 05/19/2015    MCV 88 03/21/2019    MCV 89 05/19/2015     03/21/2019     05/19/2015       Diagnostics:    none pertinent  Office Spirometry Results:     ESS: Total score: 4  XR spine lumbar 2 or 3 views injury    Result Date: 6/27/2021  Narrative: LUMBAR SPINE INDICATION:   back pain  COMPARISON:  None VIEWS:  XR SPINE LUMBAR 2 OR 3 VIEWS INJURY FINDINGS: There are 5 non rib bearing lumbar vertebral bodies  There is no evidence of acute fracture or destructive osseous lesion  Mild anterolisthesis L5-S1  Interspinous fusion device noted  Multilevel ventral osteophytosis  Disc space narrowing noted in the lower thoracic and upper lumbar spine  The pedicles appear intact  Soft tissues are unremarkable  Impression: No acute fracture identified  Workstation performed: [id number]     XR hip/pelv 2-3 vws left if performed    Result Date: 6/27/2021  Narrative: LEFT HIP INDICATION:   left hip pain  COMPARISON:  None VIEWS:  XR HIP/PELV 2-3 VWS LEFT  W PELVIS IF PERFORMED FINDINGS: There is no acute fracture or dislocation  There is mild bilateral hip joint space narrowing  No lytic or blastic osseous lesion  Stool throughout the colon  The visualized lumbar spine is unremarkable  Impression: No acute osseous abnormality   Workstation performed: [id number]

## 2021-08-17 ENCOUNTER — TELEPHONE (OUTPATIENT)
Dept: PULMONOLOGY | Facility: CLINIC | Age: 81
End: 2021-08-17

## 2021-08-17 NOTE — TELEPHONE ENCOUNTER
Patient calling stating she has been having a lot of mucus build up in what she described as the top of her throat   Please advise [phone]

## 2021-08-17 NOTE — TELEPHONE ENCOUNTER
Left her a message to discuss her symptoms  Wanted to be sure she was using her inhalers, nebs, as well as nasal sprays  Can add an antihistamine to help

## 2021-08-18 NOTE — TELEPHONE ENCOUNTER
I spoke to her and told her to continue with the inhalers incruse as per devins message and also to use the albuterol via nebulizer and to continue with montelukast and if still with the cough and the phlegm she give us a call

## 2021-09-09 ENCOUNTER — TELEPHONE (OUTPATIENT)
Dept: PULMONOLOGY | Facility: CLINIC | Age: 81
End: 2021-09-09

## 2021-09-09 NOTE — TELEPHONE ENCOUNTER
Patient is calling in this morning stating she is still sick with a bad cough, a lot of mucus she can not get rid of, very raspy and extremely tired  Can you please call her with medical advise when you have a moment? Thank you

## 2021-09-09 NOTE — TELEPHONE ENCOUNTER
Spoke with patient and sent over prednisone and z-pack for her  She will continue her nebulizer and inhalers, will let us know if no improvement in her symptoms

## 2021-09-15 ENCOUNTER — TELEPHONE (OUTPATIENT)
Dept: PULMONOLOGY | Facility: CLINIC | Age: 81
End: 2021-09-15

## 2021-09-15 NOTE — TELEPHONE ENCOUNTER
FYI-    Patient is calling in today to inform us she is feeling back to herself after starting the medication recently prescribed       Thank you

## 2021-10-25 ENCOUNTER — OFFICE VISIT (OUTPATIENT)
Dept: PULMONOLOGY | Facility: CLINIC | Age: 81
End: 2021-10-25
Payer: MEDICARE

## 2021-10-25 ENCOUNTER — TELEPHONE (OUTPATIENT)
Dept: PULMONOLOGY | Facility: CLINIC | Age: 81
End: 2021-10-25

## 2021-10-25 VITALS
BODY MASS INDEX: 35.51 KG/M2 | TEMPERATURE: 97.1 F | DIASTOLIC BLOOD PRESSURE: 68 MMHG | HEART RATE: 82 BPM | HEIGHT: 62 IN | WEIGHT: 193 LBS | SYSTOLIC BLOOD PRESSURE: 128 MMHG | OXYGEN SATURATION: 98 %

## 2021-10-25 DIAGNOSIS — E66.9 OBESITY (BMI 30-39.9): ICD-10-CM

## 2021-10-25 DIAGNOSIS — J44.9 ASTHMA WITH COPD (HCC): ICD-10-CM

## 2021-10-25 DIAGNOSIS — E66.01 OBESITY, MORBID (HCC): ICD-10-CM

## 2021-10-25 DIAGNOSIS — G47.33 OSA (OBSTRUCTIVE SLEEP APNEA): Primary | ICD-10-CM

## 2021-10-25 PROCEDURE — 99214 OFFICE O/P EST MOD 30 MIN: CPT | Performed by: PHYSICIAN ASSISTANT

## 2022-02-28 ENCOUNTER — TELEPHONE (OUTPATIENT)
Dept: PULMONOLOGY | Facility: CLINIC | Age: 82
End: 2022-02-28

## 2022-02-28 NOTE — TELEPHONE ENCOUNTER
Patient is calling in to find out if she still needs to take Symbicort twice a day  She is not home and will be away until April  She does have other inhalers with her and wants to know if she can just use one of them  Please advise  Thank you

## 2022-05-11 ENCOUNTER — TELEPHONE (OUTPATIENT)
Dept: PULMONOLOGY | Facility: CLINIC | Age: 82
End: 2022-05-11

## 2022-05-11 NOTE — TELEPHONE ENCOUNTER
Left message for pt to call office to reschedule June 2 appt from in person with harshad alcala to virtual   Please confirm with phone call and number or virtual and email detailed exam

## 2022-06-02 NOTE — TELEPHONE ENCOUNTER
Pt called back and accidentally cancelled her 6/2 appt through the automated service  Can you please call her back to reschedule    Please advise: [phone]

## 2022-06-15 ENCOUNTER — TELEPHONE (OUTPATIENT)
Dept: PULMONOLOGY | Facility: CLINIC | Age: 82
End: 2022-06-15

## 2022-06-15 DIAGNOSIS — R05.9 COUGH: Primary | ICD-10-CM

## 2022-06-15 RX ORDER — PREDNISONE 20 MG/1
TABLET ORAL
Qty: 18 TABLET | Refills: 0 | Status: SHIPPED | OUTPATIENT
Start: 2022-06-15 | End: 2022-08-16

## 2022-06-15 NOTE — TELEPHONE ENCOUNTER
Patient is calling, she thought she just had a head cold but it turns out she is covid positive  Her doctor told her to speak with Pham Zuniga if she recommended anything for her   Please advise

## 2022-06-15 NOTE — TELEPHONE ENCOUNTER
Spoke with the patient, she states she just feels as if there is a head cold some coughing no fevers states she does not feel as if she short of breath continues to use her Symbicort as well as a nebulizer, checked pulse ox 92-93 %, discussed with the patient to continue with the current inhalers and I am sending course of prednisone tapering down dose and will also order a chest x-ray   Continue to monitor the oxygen saturations if less than 91% she will go into the ER right away understands and verbalizes

## 2022-06-16 ENCOUNTER — HOSPITAL ENCOUNTER (OUTPATIENT)
Dept: RADIOLOGY | Facility: HOSPITAL | Age: 82
Discharge: HOME | End: 2022-06-16
Payer: MEDICARE

## 2022-06-16 DIAGNOSIS — R05.9 COUGH: ICD-10-CM

## 2022-06-16 PROCEDURE — 71046 X-RAY EXAM CHEST 2 VIEWS: CPT

## 2022-06-16 NOTE — TELEPHONE ENCOUNTER
Pt called re: she received a script from Dr Joyce Cheng for [facility] and was told not to take it while on prednisone  Also she was told to get a CXR and wanted to know if she can do that with being COVID positive if theres anything she needs to do  She is very confused and anxious and would appreciate a call back from the doctor     Please advise: [phone]

## 2022-06-16 NOTE — TELEPHONE ENCOUNTER
Spoke to the patient, she states she is more short of breath today and having more coughing spells discussed with the patient to go into the ER right away but she states does not want to go into the ER  Discussed to start on the Paxil with the prescription that has been given and continue with the long-acting inhalers and the nebulizers and hold off on the prednisone for now    She can get the chest x-ray done when able she states when her daughter comes in today tomorrow she will go in to get the chest x-ray done and again discussed with the patient that if she is more short of breath or oxygen level dropping below 91% she will go into the ER right away understands and verbalizes

## 2022-06-20 NOTE — TELEPHONE ENCOUNTER
Spoke with patient about the CXR which is clear  She is still having a lot of cough  She finishes the [employer]  She will start the prednisone tomorrow if she is still coughing a lot  She is still using her inhaler twice per day and nebs 3 times per day

## 2022-06-20 NOTE — TELEPHONE ENCOUNTER
Patient calling asking for the results of the CXR, she also wants to know if she should start the steroids   Please advise

## 2022-07-07 ENCOUNTER — TELEPHONE (OUTPATIENT)
Dept: PULMONOLOGY | Facility: CLINIC | Age: 82
End: 2022-07-07

## 2022-07-07 NOTE — TELEPHONE ENCOUNTER
Pt LM re: she is "over COVID' but sometimes get short of breath  She would like someone to call her back to go over the nebulizer regime/protocol with her    Please advise: [phone]

## 2022-07-07 NOTE — TELEPHONE ENCOUNTER
Pt returning Dr Laurie Ceballos call  I did advise her of previous note  Pt confirmed understanding and will call us back if she is still not feeling better

## 2022-07-07 NOTE — TELEPHONE ENCOUNTER
Called and left a message for the patient at the above number to give us a call back so that we can discuss the nebulizer regimen protocol  She needs to use her Symbicort 2 puffs twice daily rinse mouth after use and the albuterol via nebulizer 4 times daily as needed if somebody can tell her in case she calls back

## 2022-07-27 ENCOUNTER — HOSPITAL ENCOUNTER (EMERGENCY)
Facility: HOSPITAL | Age: 82
Discharge: HOME | End: 2022-07-27
Attending: EMERGENCY MEDICINE
Payer: MEDICARE

## 2022-07-27 ENCOUNTER — OFFICE VISIT (OUTPATIENT)
Dept: URGENT CARE | Facility: CLINIC | Age: 82
End: 2022-07-27
Payer: MEDICARE

## 2022-07-27 ENCOUNTER — APPOINTMENT (EMERGENCY)
Dept: RADIOLOGY | Facility: HOSPITAL | Age: 82
End: 2022-07-27
Payer: MEDICARE

## 2022-07-27 VITALS
HEART RATE: 61 BPM | DIASTOLIC BLOOD PRESSURE: 74 MMHG | TEMPERATURE: 98 F | RESPIRATION RATE: 19 BRPM | SYSTOLIC BLOOD PRESSURE: 126 MMHG | OXYGEN SATURATION: 95 %

## 2022-07-27 VITALS
TEMPERATURE: 97.9 F | BODY MASS INDEX: 35.51 KG/M2 | OXYGEN SATURATION: 98 % | RESPIRATION RATE: 18 BRPM | WEIGHT: 193 LBS | HEIGHT: 62 IN | HEART RATE: 77 BPM | SYSTOLIC BLOOD PRESSURE: 157 MMHG | DIASTOLIC BLOOD PRESSURE: 97 MMHG

## 2022-07-27 DIAGNOSIS — R07.89 ATYPICAL CHEST PAIN: Primary | ICD-10-CM

## 2022-07-27 DIAGNOSIS — K21.9 GERD (GASTROESOPHAGEAL REFLUX DISEASE): ICD-10-CM

## 2022-07-27 DIAGNOSIS — R07.9 CHEST PAIN, UNSPECIFIED TYPE: Primary | ICD-10-CM

## 2022-07-27 LAB
ANION GAP SERPL CALCULATED.3IONS-SCNC: 9 MMOL/L (ref 4–13)
BASOPHILS # BLD AUTO: 0.06 THOUSANDS/ΜL (ref 0–0.1)
BASOPHILS NFR BLD AUTO: 1 % (ref 0–1)
BUN SERPL-MCNC: 16 MG/DL (ref 5–25)
CALCIUM SERPL-MCNC: 8.8 MG/DL (ref 8.3–10.1)
CARDIAC TROPONIN I PNL SERPL HS: 2 NG/L
CHLORIDE SERPL-SCNC: 102 MMOL/L (ref 96–108)
CO2 SERPL-SCNC: 25 MMOL/L (ref 21–32)
CREAT SERPL-MCNC: 0.84 MG/DL (ref 0.6–1.3)
EOSINOPHIL # BLD AUTO: 0.18 THOUSAND/ΜL (ref 0–0.61)
EOSINOPHIL NFR BLD AUTO: 3 % (ref 0–6)
ERYTHROCYTE [DISTWIDTH] IN BLOOD BY AUTOMATED COUNT: 13.6 % (ref 11.6–15.1)
GFR SERPL CREATININE-BSD FRML MDRD: 65 ML/MIN/1.73SQ M
GLUCOSE SERPL-MCNC: 98 MG/DL (ref 65–140)
HCT VFR BLD AUTO: 43 % (ref 34.8–46.1)
HGB BLD-MCNC: 14.3 G/DL (ref 11.5–15.4)
IMM GRANULOCYTES # BLD AUTO: 0.02 THOUSAND/UL (ref 0–0.2)
IMM GRANULOCYTES NFR BLD AUTO: 0 % (ref 0–2)
LYMPHOCYTES # BLD AUTO: 1.01 THOUSANDS/ΜL (ref 0.6–4.47)
LYMPHOCYTES NFR BLD AUTO: 16 % (ref 14–44)
MCH RBC QN AUTO: 30.2 PG (ref 26.8–34.3)
MCHC RBC AUTO-ENTMCNC: 33.3 G/DL (ref 31.4–37.4)
MCV RBC AUTO: 91 FL (ref 82–98)
MONOCYTES # BLD AUTO: 0.59 THOUSAND/ΜL (ref 0.17–1.22)
MONOCYTES NFR BLD AUTO: 9 % (ref 4–12)
NEUTROPHILS # BLD AUTO: 4.67 THOUSANDS/ΜL (ref 1.85–7.62)
NEUTS SEG NFR BLD AUTO: 71 % (ref 43–75)
NRBC BLD AUTO-RTO: 0 /100 WBCS
PLATELET # BLD AUTO: 231 THOUSANDS/UL (ref 149–390)
PMV BLD AUTO: 9.1 FL (ref 8.9–12.7)
POTASSIUM SERPL-SCNC: 3.9 MMOL/L (ref 3.5–5.3)
RBC # BLD AUTO: 4.73 MILLION/UL (ref 3.81–5.12)
SODIUM SERPL-SCNC: 136 MMOL/L (ref 135–147)
WBC # BLD AUTO: 6.53 THOUSAND/UL (ref 4.31–10.16)

## 2022-07-27 PROCEDURE — 71045 X-RAY EXAM CHEST 1 VIEW: CPT

## 2022-07-27 PROCEDURE — 93005 ELECTROCARDIOGRAM TRACING: CPT | Performed by: PHYSICIAN ASSISTANT

## 2022-07-27 PROCEDURE — 36415 COLL VENOUS BLD VENIPUNCTURE: CPT | Performed by: EMERGENCY MEDICINE

## 2022-07-27 PROCEDURE — 99285 EMERGENCY DEPT VISIT HI MDM: CPT

## 2022-07-27 PROCEDURE — 99203 OFFICE O/P NEW LOW 30 MIN: CPT | Performed by: PHYSICIAN ASSISTANT

## 2022-07-27 PROCEDURE — 85025 COMPLETE CBC W/AUTO DIFF WBC: CPT | Performed by: EMERGENCY MEDICINE

## 2022-07-27 PROCEDURE — G0463 HOSPITAL OUTPT CLINIC VISIT: HCPCS | Performed by: PHYSICIAN ASSISTANT

## 2022-07-27 PROCEDURE — 99285 EMERGENCY DEPT VISIT HI MDM: CPT | Performed by: EMERGENCY MEDICINE

## 2022-07-27 PROCEDURE — 84484 ASSAY OF TROPONIN QUANT: CPT | Performed by: EMERGENCY MEDICINE

## 2022-07-27 PROCEDURE — 93005 ELECTROCARDIOGRAM TRACING: CPT

## 2022-07-27 PROCEDURE — C9113 INJ PANTOPRAZOLE SODIUM, VIA: HCPCS | Performed by: EMERGENCY MEDICINE

## 2022-07-27 PROCEDURE — 80048 BASIC METABOLIC PNL TOTAL CA: CPT | Performed by: EMERGENCY MEDICINE

## 2022-07-27 PROCEDURE — 96374 THER/PROPH/DIAG INJ IV PUSH: CPT

## 2022-07-27 RX ORDER — FAMOTIDINE 20 MG/1
20 TABLET, FILM COATED ORAL DAILY
Qty: 20 TABLET | Refills: 0 | Status: SHIPPED | OUTPATIENT
Start: 2022-07-27 | End: 2022-10-07

## 2022-07-27 RX ORDER — PANTOPRAZOLE SODIUM 40 MG/10ML
40 INJECTION, POWDER, LYOPHILIZED, FOR SOLUTION INTRAVENOUS ONCE
Status: COMPLETED | OUTPATIENT
Start: 2022-07-27 | End: 2022-07-27

## 2022-07-27 RX ORDER — MAGNESIUM HYDROXIDE/ALUMINUM HYDROXICE/SIMETHICONE 120; 1200; 1200 MG/30ML; MG/30ML; MG/30ML
30 SUSPENSION ORAL ONCE
Status: COMPLETED | OUTPATIENT
Start: 2022-07-27 | End: 2022-07-27

## 2022-07-27 RX ORDER — LIDOCAINE HYDROCHLORIDE 20 MG/ML
15 SOLUTION OROPHARYNGEAL ONCE
Status: COMPLETED | OUTPATIENT
Start: 2022-07-27 | End: 2022-07-27

## 2022-07-27 RX ORDER — SUCRALFATE 1 G/1
1 TABLET ORAL 4 TIMES DAILY
Qty: 90 TABLET | Refills: 0 | Status: SHIPPED | OUTPATIENT
Start: 2022-07-27

## 2022-07-27 RX ADMIN — PANTOPRAZOLE SODIUM 40 MG: 40 INJECTION, POWDER, FOR SOLUTION INTRAVENOUS at 11:05

## 2022-07-27 RX ADMIN — ALUMINA, MAGNESIA, AND SIMETHICONE ORAL SUSPENSION REGULAR STRENGTH 30 ML: 1200; 1200; 120 SUSPENSION ORAL at 11:05

## 2022-07-27 RX ADMIN — LIDOCAINE HYDROCHLORIDE 15 ML: 20 SOLUTION ORAL; TOPICAL at 11:05

## 2022-07-27 NOTE — ED NOTES
Patient noting relief of pain after medications   Patient requesting discharge home      Tyshawn Urias RN  07/27/22 [id number]

## 2022-07-27 NOTE — ED PROVIDER NOTES
History  Chief Complaint   Patient presents with    Chest Pain     Presents from urgent care by EMS for two days of substernal/epigastric pain non-radiating  Received 324 asa by ems  [de-identified] y o  F presents w CP, sent by EMS from urgent care  Given ASA enroute  NSR - no abnormalities on EMS ekgs  2-4 days of CP, epigastric, low sternal CP  Felt like her bra was on too tight, but she took it off and it was still hurting her  Nausea without vomiting  No SOB  No syncope, no palpitations, no radiating CP, no other complaints  Hx of reflux that does feel similar to this  Prior to Admission Medications   Prescriptions Last Dose Informant Patient Reported? Taking?    B Complex-Folic Acid (B COMPLEX FORMULA 1) TABS   Yes No   Sig: Take by mouth   FLUoxetine (PROzac) 40 MG capsule   Yes No   Sig: Take 40 mg by mouth daily   LORazepam (ATIVAN) 0 5 mg tablet   Yes No   Sig: lorazepam 0 5 mg tablet   Patient not taking: Reported on 2022   Zoster Vaccine Live 29749 UNT/0 65ML SUSR   Yes No   Sig: Zostavax (PF) 19,400 unit/0 65 mL subcutaneous suspension   Patient not taking: Reported on 2022   albuterol (ACCUNEB) 0 63 MG/3ML nebulizer solution   No No   Sig: Take 3 mL (0 63 mg total) by nebulization every 6 (six) hours as needed for wheezing or shortness of breath   Patient not taking: Reported on 2022   albuterol (PROVENTIL HFA,VENTOLIN HFA) 90 mcg/act inhaler   No No   Sig: Inhale 1 puff every 6 (six) hours as needed for wheezing   aspirin 81 mg chewable tablet   Yes No   Sig: Chew 81 mg daily   azelastine (ASTELIN) 0 1 % nasal spray   No No   Si spray into each nostril 2 (two) times a day Use in each nostril as directed   benzonatate (TESSALON PERLES) 100 mg capsule   Yes No   Sig: Take 100 mg by mouth 3 (three) times a day as needed for cough   budesonide-formoterol (SYMBICORT) 160-4 5 mcg/act inhaler   Yes No   Sig: Symbicort 160 mcg-4 5 mcg/actuation HFA aerosol inhaler   Patient not taking: Reported on 2022   budesonide-formoterol (Symbicort) 160-4 5 mcg/act inhaler   No No   Sig: Inhale 2 puffs 2 (two) times a day Rinse mouth after use  clotrimazole (MYCELEX) 10 mg james   No No   Sig: Take 1 tablet (10 mg total) by mouth 5 (five) times a day   Patient not taking: Reported on 2022   hydrochlorothiazide (HYDRODIURIL) 25 mg tablet   Yes No   Sig: Take 25 mg by mouth daily   ibuprofen (MOTRIN) 800 mg tablet   No No   Sig: Take 1 tablet (800 mg total) by mouth 3 (three) times a day   levothyroxine 50 mcg tablet   Yes No   Sig: Take 50 mcg by mouth daily   montelukast (SINGULAIR) 10 mg tablet   No No   Sig: Take 1 tablet (10 mg total) by mouth daily at bedtime   pantoprazole (PROTONIX) 40 mg tablet   Yes No   Sig: Take 40 mg by mouth daily   predniSONE 20 mg tablet   No No   Sig: Use 2 tablets for 5 days 1 tablet for 5 days and half a tablet for 5 days   Patient not taking: Reported on 2022   simvastatin (ZOCOR) 10 mg tablet   Yes No   Sig: Take 10 mg by mouth daily at bedtime   Patient not taking: Reported on 2022   sodium chloride (OCEAN) 0 65 % nasal spray   No No   Si spray into each nostril as needed for congestion or rhinitis      Facility-Administered Medications: None       Past Medical History:   Diagnosis Date    Anxiety     COPD (chronic obstructive pulmonary disease) (HCC)     Depression     Disease of thyroid gland     Hyperlipidemia     Hypotension     Wrist fracture        Past Surgical History:   Procedure Laterality Date    BACK SURGERY  2017    INCONTINENCE SURGERY      NEPHRECTOMY TRANSPLANTED ORGAN      REPLACEMENT TOTAL KNEE BILATERAL         History reviewed  No pertinent family history  I have reviewed and agree with the history as documented      E-Cigarette/Vaping    E-Cigarette Use Never User      E-Cigarette/Vaping Substances     Social History     Tobacco Use    Smoking status: Former Smoker     Packs/day: 0 25     Years: 30 00 Pack years: 7 50     Start date: 1970     Quit date: 2000     Years since quittin 5    Smokeless tobacco: Never Used   Vaping Use    Vaping Use: Never used   Substance Use Topics    Alcohol use: No    Drug use: No       Review of Systems   Constitutional: Positive for fatigue  Negative for chills and fever  HENT: Negative for congestion and sore throat  Respiratory: Negative for cough, chest tightness and shortness of breath  Cardiovascular: Positive for chest pain  Negative for palpitations and leg swelling  Gastrointestinal: Positive for abdominal pain (epigastric) and nausea  Negative for constipation, diarrhea and vomiting  Genitourinary: Negative for dysuria and flank pain  Musculoskeletal: Negative for back pain and neck pain  Skin: Negative for color change and rash  Allergic/Immunologic: Negative for immunocompromised state  Neurological: Negative for dizziness, syncope, weakness, light-headedness, numbness and headaches  Physical Exam  Physical Exam  Vitals reviewed  Constitutional:       General: She is not in acute distress  Appearance: She is well-developed  She is not ill-appearing, toxic-appearing or diaphoretic  HENT:      Head: Normocephalic and atraumatic  Eyes:      General: No scleral icterus  Right eye: No discharge  Left eye: No discharge  Conjunctiva/sclera: Conjunctivae normal       Pupils: Pupils are equal, round, and reactive to light  Neck:      Vascular: No JVD  Cardiovascular:      Rate and Rhythm: Normal rate and regular rhythm  Heart sounds: Normal heart sounds  No murmur heard  No friction rub  No gallop  Pulmonary:      Effort: Pulmonary effort is normal  No respiratory distress  Breath sounds: Normal breath sounds  No decreased breath sounds, wheezing, rhonchi or rales  Chest:      Chest wall: Tenderness (low sternal / epigastric) present     Abdominal:      General: Bowel sounds are normal  There is no distension  Palpations: Abdomen is soft  Tenderness: There is no abdominal tenderness  There is no guarding or rebound  Musculoskeletal:         General: No tenderness or deformity  Normal range of motion  Cervical back: Normal range of motion and neck supple  Right lower leg: No tenderness  No edema  Left lower leg: No tenderness  No edema  Skin:     General: Skin is warm and dry  Coloration: Skin is not pale  Findings: No erythema or rash  Neurological:      Mental Status: She is alert and oriented to person, place, and time  Cranial Nerves: No cranial nerve deficit     Psychiatric:         Behavior: Behavior normal          Vital Signs  ED Triage Vitals [07/27/22 1100]   Temperature Pulse Respirations Blood Pressure SpO2   98 °F (36 7 °C) 73 19 (!) 173/82 93 %      Temp src Heart Rate Source Patient Position - Orthostatic VS BP Location FiO2 (%)   -- -- -- -- --      Pain Score       4           Vitals:    07/27/22 1100 07/27/22 1130 07/27/22 1230   BP: (!) 173/82 156/72 126/74   Pulse: 73 68 61         Visual Acuity      ED Medications  Medications   aluminum-magnesium hydroxide-simethicone (MYLANTA) oral suspension 30 mL (30 mL Oral Given 7/27/22 1105)   pantoprazole (PROTONIX) injection 40 mg (40 mg Intravenous Given 7/27/22 1105)   Lidocaine Viscous HCl (XYLOCAINE) 2 % mucosal solution 15 mL (15 mL Oral Given 7/27/22 1105)       Diagnostic Studies  Results Reviewed     Procedure Component Value Units Date/Time    HS Troponin 0hr (reflex protocol) [[id number]]  (Normal) Collected: 07/27/22 1101    Lab Status: Final result Specimen: Blood from Arm, Left Updated: 07/27/22 1140     hs TnI 0hr 2 ng/L     Basic metabolic panel [[id number]] Collected: 07/27/22 1101    Lab Status: Final result Specimen: Blood from Arm, Left Updated: 07/27/22 1127     Sodium 136 mmol/L      Potassium 3 9 mmol/L      Chloride 102 mmol/L      CO2 25 mmol/L      ANION GAP 9 mmol/L BUN 16 mg/dL      Creatinine 0 84 mg/dL      Glucose 98 mg/dL      Calcium 8 8 mg/dL      eGFR 65 ml/min/1 73sq m     Narrative:      [place] guidelines for Chronic Kidney Disease (CKD):     Stage 1 with normal or high GFR (GFR > 90 mL/min/1 73 square meters)    Stage 2 Mild CKD (GFR = 60-89 mL/min/1 73 square meters)    Stage 3A Moderate CKD (GFR = 45-59 mL/min/1 73 square meters)    Stage 3B Moderate CKD (GFR = 30-44 mL/min/1 73 square meters)    Stage 4 Severe CKD (GFR = 15-29 mL/min/1 73 square meters)    Stage 5 End Stage CKD (GFR <15 mL/min/1 73 square meters)  Note: GFR calculation is accurate only with a steady state creatinine    CBC and differential [[id number]] Collected: 07/27/22 1101    Lab Status: Final result Specimen: Blood from Arm, Left Updated: 07/27/22 1114     WBC 6 53 Thousand/uL      RBC 4 73 Million/uL      Hemoglobin 14 3 g/dL      Hematocrit 43 0 %      MCV 91 fL      MCH 30 2 pg      MCHC 33 3 g/dL      RDW 13 6 %      MPV 9 1 fL      Platelets 369 Thousands/uL      nRBC 0 /100 WBCs      Neutrophils Relative 71 %      Immat GRANS % 0 %      Lymphocytes Relative 16 %      Monocytes Relative 9 %      Eosinophils Relative 3 %      Basophils Relative 1 %      Neutrophils Absolute 4 67 Thousands/µL      Immature Grans Absolute 0 02 Thousand/uL      Lymphocytes Absolute 1 01 Thousands/µL      Monocytes Absolute 0 59 Thousand/µL      Eosinophils Absolute 0 18 Thousand/µL      Basophils Absolute 0 06 Thousands/µL                  X-ray chest 1 view portable   Final Result by Hayde Costa MD (07/27 3782)      No acute cardiopulmonary disease                    Workstation performed: [id number]                    Procedures  ECG 12 Lead Documentation Only    Date/Time: 7/27/2022 10:58 AM  Performed by: Nelly Weeks DO  Authorized by: Nelly Weeks DO     Indications / Diagnosis:  Chest pain  ECG reviewed by me, the ED Provider: yes Patient location:  ED  Previous ECG:     Comparison to cardiac monitor: Yes    Interpretation:     Interpretation: normal    Rate:     ECG rate:  74    ECG rate assessment: normal    Rhythm:     Rhythm: sinus rhythm    Ectopy:     Ectopy: none    QRS:     QRS axis:  Normal    QRS intervals:  Normal  Conduction:     Conduction: normal    ST segments:     ST segments:  Normal  T waves:     T waves: normal               ED Course  ED Course as of 07/27/22 1458   Wed Jul 27, 2022   1223 hs TnI 0hr: 2                               SBIRT 20yo+    Flowsheet Row Most Recent Value   SBIRT (25 yo +)    In order to provide better care to our patients, we are screening all of our patients for alcohol and drug use  Would it be okay to ask you these screening questions? Yes Filed at: 07/27/2022 1056   Initial Alcohol Screen: US AUDIT-C     1  How often do you have a drink containing alcohol? 0 Filed at: 07/27/2022 1056   2  How many drinks containing alcohol do you have on a typical day you are drinking? 0 Filed at: 07/27/2022 1056   3b  FEMALE Any Age, or MALE 65+: How often do you have 4 or more drinks on one occassion? 0 Filed at: 07/27/2022 1056   Audit-C Score 0 Filed at: 07/27/2022 1056   LIDIA: How many times in the past year have you    Used an illegal drug or used a prescription medication for non-medical reasons? Never Filed at: 07/27/2022 1056                    MDM  Number of Diagnoses or Management Options  Atypical chest pain  GERD (gastroesophageal reflux disease)  Diagnosis management comments: Chest pain, with normal ACS work up - given that the symptoms onset 2-4 days ago, a single cardiac eval is sufficient to r/o cardiac disease  Feels improved after GERD medications - pain is likely reflux, advised f/u GI  Advised return if worsening or change in sx  Discussed with patient and they understood the risks and benefits of discharge    Patient had opportunity to ask questions regarding care and discharge instructions and had no further questions  Advised follow up with PCP/GI, advised returning if worsening, and discussed disease specific return precautions  Patient understood discharge instructions  Amount and/or Complexity of Data Reviewed  Clinical lab tests: ordered and reviewed  Tests in the radiology section of CPT®: ordered and reviewed        Disposition  Final diagnoses:   Atypical chest pain   GERD (gastroesophageal reflux disease)     Time reflects when diagnosis was documented in both MDM as applicable and the Disposition within this note     Time User Action Codes Description Comment    7/27/2022 12:27 PM Donaldo Rodriguez Add [R07 89] Atypical chest pain     7/27/2022 12:27 PM Dora Rodriguez Add [K21 9] GERD (gastroesophageal reflux disease)       ED Disposition     ED Disposition   Discharge    Condition   Stable    Date/Time   Wed Jul 27, 2022 12:27 PM    [address] discharge to home/self care                 Follow-up Information     Follow up With Specialties Details Why Contact Info Additional Information    Jessie Mitchell MD Internal Medicine Schedule an appointment as soon as possible for a visit  For follow up to ensure improvement, and for further testing and treatment as needed [address] [facility] [address] [facility] Emergency Department Emergency Medicine Go to  If symptoms worsen: chest pain, sweating, vomiting, trouble breathing, etc [address] [place] [address] [facility] Department, [address] [place], [place], [address]  [facility] Schedule an appointment as soon as possible for a visit  For follow up to ensure improvement, and for further testing and treatment as needed [address] [address] [phone] [facility], [address], [address] [facility], [facility], [address] [facility], [address] [facility]           Discharge Medication List as of 7/27/2022 12:32 PM      START taking these medications    Details   famotidine (PEPCID) 20 mg tablet Take 1 tablet (20 mg total) by mouth daily, Starting Wed 7/27/2022, Normal      sucralfate (CARAFATE) 1 g tablet Take 1 tablet (1 g total) by mouth 4 (four) times a day Before meals and before bed as needed for reflux, Starting Wed 7/27/2022, Normal         CONTINUE these medications which have NOT CHANGED    Details   albuterol (ACCUNEB) 0 63 MG/3ML nebulizer solution Take 3 mL (0 63 mg total) by nebulization every 6 (six) hours as needed for wheezing or shortness of breath, Starting Fri 10/2/2020, Normal      albuterol (PROVENTIL HFA,VENTOLIN HFA) 90 mcg/act inhaler Inhale 1 puff every 6 (six) hours as needed for wheezing, Starting Tue 9/29/2020, Normal      aspirin 81 mg chewable tablet Chew 81 mg daily, Historical Med      azelastine (ASTELIN) 0 1 % nasal spray 1 spray into each nostril 2 (two) times a day Use in each nostril as directed, Starting Tue 3/23/2021, Normal      B Complex-Folic Acid (B COMPLEX FORMULA 1) TABS Take by mouth, Historical Med      benzonatate (TESSALON PERLES) 100 mg capsule Take 100 mg by mouth 3 (three) times a day as needed for cough, Historical Med      !! budesonide-formoterol (SYMBICORT) 160-4 5 mcg/act inhaler Symbicort 160 mcg-4 5 mcg/actuation HFA aerosol inhaler, Historical Med      !! budesonide-formoterol (Symbicort) 160-4 5 mcg/act inhaler Inhale 2 puffs 2 (two) times a day Rinse mouth after use , Starting Mon 2/28/2022, Normal      clotrimazole (MYCELEX) 10 mg james Take 1 tablet (10 mg total) by mouth 5 (five) times a day, Starting Thu 4/4/2019, Normal      FLUoxetine (PROzac) 40 MG capsule Take 40 mg by mouth daily, Historical Med      hydrochlorothiazide (HYDRODIURIL) 25 mg tablet Take 25 mg by mouth daily, Historical Med      ibuprofen (MOTRIN) 800 mg tablet Take 1 tablet (800 mg total) by mouth 3 (three) times a day, Starting Sun 6/27/2021, Normal      levothyroxine 50 mcg tablet Take 50 mcg by mouth daily, Historical Med      LORazepam (ATIVAN) 0 5 mg tablet lorazepam 0 5 mg tablet, Historical Med      montelukast (SINGULAIR) 10 mg tablet Take 1 tablet (10 mg total) by mouth daily at bedtime, Starting Mon 4/5/2021, Normal      pantoprazole (PROTONIX) 40 mg tablet Take 40 mg by mouth daily, Historical Med      predniSONE 20 mg tablet Use 2 tablets for 5 days 1 tablet for 5 days and half a tablet for 5 days, Normal      simvastatin (ZOCOR) 10 mg tablet Take 10 mg by mouth daily at bedtime, Historical Med      sodium chloride (OCEAN) 0 65 % nasal spray 1 spray into each nostril as needed for congestion or rhinitis, Starting Thu 4/4/2019, Normal      Zoster Vaccine Live 88220 UNT/0 65ML SUSR Zostavax (PF) 19,400 unit/0 65 mL subcutaneous suspension, Historical Med       !! - Potential duplicate medications found  Please discuss with provider  No discharge procedures on file      PDMP Review     None          ED Provider  Electronically Signed by           Cyril Thornton DO  07/27/22 8903

## 2022-07-31 LAB
ATRIAL RATE: 74 BPM
ATRIAL RATE: 74 BPM
P AXIS: 41 DEGREES
P AXIS: 9 DEGREES
PR INTERVAL: 140 MS
PR INTERVAL: 142 MS
QRS AXIS: -2 DEGREES
QRS AXIS: 24 DEGREES
QRSD INTERVAL: 74 MS
QRSD INTERVAL: 76 MS
QT INTERVAL: 408 MS
QT INTERVAL: 410 MS
QTC INTERVAL: 452 MS
QTC INTERVAL: 455 MS
T WAVE AXIS: -17 DEGREES
T WAVE AXIS: 67 DEGREES
VENTRICULAR RATE: 74 BPM
VENTRICULAR RATE: 74 BPM

## 2022-07-31 PROCEDURE — 93010 ELECTROCARDIOGRAM REPORT: CPT | Performed by: INTERNAL MEDICINE

## 2022-08-16 ENCOUNTER — OFFICE VISIT (OUTPATIENT)
Dept: PULMONOLOGY | Facility: CLINIC | Age: 82
End: 2022-08-16
Payer: MEDICARE

## 2022-08-16 VITALS
RESPIRATION RATE: 18 BRPM | SYSTOLIC BLOOD PRESSURE: 128 MMHG | HEIGHT: 62 IN | HEART RATE: 72 BPM | WEIGHT: 181 LBS | DIASTOLIC BLOOD PRESSURE: 72 MMHG | BODY MASS INDEX: 33.31 KG/M2 | OXYGEN SATURATION: 95 %

## 2022-08-16 DIAGNOSIS — G47.33 OSA (OBSTRUCTIVE SLEEP APNEA): ICD-10-CM

## 2022-08-16 DIAGNOSIS — E66.01 OBESITY, MORBID (HCC): ICD-10-CM

## 2022-08-16 DIAGNOSIS — J44.9 ASTHMA WITH COPD (HCC): Primary | ICD-10-CM

## 2022-08-16 PROCEDURE — 99213 OFFICE O/P EST LOW 20 MIN: CPT | Performed by: PHYSICIAN ASSISTANT

## 2022-08-16 RX ORDER — IBUPROFEN 600 MG/1
TABLET ORAL
COMMUNITY
End: 2022-10-07

## 2022-08-16 RX ORDER — FAMOTIDINE 40 MG/1
TABLET, FILM COATED ORAL
COMMUNITY
Start: 2022-04-20 | End: 2022-10-07

## 2022-08-16 RX ORDER — MONTELUKAST SODIUM 10 MG/1
TABLET ORAL
COMMUNITY

## 2022-08-16 RX ORDER — DIAZEPAM 5 MG/1
TABLET ORAL
COMMUNITY
End: 2022-10-07

## 2022-08-16 RX ORDER — HYDROCODONE BITARTRATE AND ACETAMINOPHEN 5; 325 MG/1; MG/1
TABLET ORAL
COMMUNITY

## 2022-08-16 RX ORDER — IPRATROPIUM BROMIDE 42 UG/1
SPRAY, METERED NASAL
COMMUNITY
Start: 2022-06-15

## 2022-08-16 RX ORDER — FLUOXETINE HYDROCHLORIDE 20 MG/1
CAPSULE ORAL
Status: ON HOLD | COMMUNITY
End: 2022-10-05 | Stop reason: SDUPTHER

## 2022-08-16 RX ORDER — LEVOTHYROXINE SODIUM 0.05 MG/1
TABLET ORAL
Status: ON HOLD | COMMUNITY
End: 2022-10-05 | Stop reason: SDUPTHER

## 2022-08-16 RX ORDER — DOXYCYCLINE HYCLATE 100 MG/1
CAPSULE ORAL
COMMUNITY
End: 2022-10-07

## 2022-08-16 RX ORDER — CELECOXIB 200 MG/1
CAPSULE ORAL
COMMUNITY
End: 2022-10-07

## 2022-08-16 RX ORDER — FLUTICASONE PROPIONATE 50 MCG
SPRAY, SUSPENSION (ML) NASAL
COMMUNITY

## 2022-08-16 RX ORDER — MELOXICAM 15 MG/1
TABLET ORAL
COMMUNITY
End: 2022-10-07

## 2022-08-16 RX ORDER — CIPROFLOXACIN 500 MG/1
TABLET, FILM COATED ORAL
COMMUNITY
End: 2022-10-07

## 2022-08-16 RX ORDER — ARIPIPRAZOLE 5 MG/1
TABLET ORAL
COMMUNITY

## 2022-08-16 RX ORDER — LORAZEPAM 0.5 MG/1
TABLET ORAL
COMMUNITY
End: 2022-10-07

## 2022-08-17 NOTE — PROGRESS NOTES
Assessment/Plan:   Diagnoses and all orders for this visit:    Asthma with COPD ([facility] [address] )    Obesity, morbid ([address] )    CHUYITA (obstructive sleep apnea)    Other orders  -     ARIPiprazole (ABILIFY) 5 mg tablet; aripiprazole 5 mg tablet   TAKE 1 TABLET BY MOUTH EVERY DAY  -     celecoxib (CeleBREX) 200 mg capsule; celecoxib 200 mg capsule  -     ciprofloxacin (CIPRO) 500 mg tablet; ciprofloxacin 500 mg tablet   TAKE 1 TABLET BY MOUTH EVERY 12 HOURS FOR 5 DAYS  -     diazepam (VALIUM) 5 mg tablet; diazepam 5 mg tablet  -     doxycycline hyclate (VIBRAMYCIN) 100 mg capsule; doxycycline hyclate 100 mg capsule  -     famotidine (PEPCID) 40 MG tablet; famotidine 40 mg tablet  -     FLUoxetine (PROzac) 20 mg capsule; fluoxetine 20 mg capsule  -     fluticasone (FLONASE) 50 mcg/act nasal spray; fluticasone propionate 50 mcg/actuation nasal spray,suspension   SPRAY 1 SPRAY INTO EACH NOSTRIL EVERY DAY  -     HYDROcodone-acetaminophen (NORCO) 5-325 mg per tablet; hydrocodone 5 mg-acetaminophen 325 mg tablet  -     ibuprofen (MOTRIN) 600 mg tablet; ibuprofen 600 mg tablet  -     ipratropium (ATROVENT) 0 06 % nasal spray; ipratropium bromide 42 mcg (0 06 %) nasal spray  -     levothyroxine 50 mcg tablet; levothyroxine 50 mcg tablet  -     LORazepam (ATIVAN) 0 5 mg tablet; lorazepam 0 5 mg tablet  -     meloxicam (MOBIC) 15 mg tablet; meloxicam 15 mg tablet  -     montelukast (SINGULAIR) 10 mg tablet; montelukast 10 mg tablet  -     Paxlovid tablet therapy pack; PLEASE SEE ATTACHED FOR DETAILED DIRECTIONS     Patient is here today for follow-up  Overall remains stable with her breathing  She will continue with her Symbicort twice per day, albuterol 4 times per day as needed, Singulair  She had a recent chest x-ray done that was clear  She does have a CPAP at home to use for her CHUYITA  She is not always compliant with CPAP  She will follow-up with us in about 4 months or sooner if necessary                              Return in about 4 months (around 12/16/2022)  All questions are answered to the patient's satisfaction and understanding  She verbalizes understanding  She is encouraged to call with any further questions or concerns  Portions of the record may have been created with voice recognition software  Occasional wrong word or "sound a like" substitutions may have occurred due to the inherent limitations of voice recognition software  Read the chart carefully and recognize, using context, where substitutions have occurred  Electronically Signed by Rafal Saba PA-C    ______________________________________________________________________    Chief Complaint:   Chief Complaint   Patient presents with    Follow-up       Patient ID: Aashish Wang is a [de-identified] y o  y o  female has a past medical history of Anxiety, COPD (chronic obstructive pulmonary disease) ([address] ), Depression, Disease of thyroid gland, Hyperlipidemia, Hypotension, and Wrist fracture  8/16/2022  Patient presents today for follow-up visit  Patient is an 50-year-old female former smoker who quit over 20 years ago with past medical history of asthma, COPD, seasonal environmental allergies, hiatal hernia, GERD, hypertension, obesity, CHUYITA  She is here today for follow-up  She is overall stable with her breathing  She continues with her Symbicort twice per day, albuterol as needed  She does find that she fatigues easier than she had in the past though overall still able to do all of her daily activities  Review of Systems   Constitutional: Positive for fatigue  HENT: Negative  Respiratory: Positive for shortness of breath  Cardiovascular: Negative  Gastrointestinal: Negative  Genitourinary: Negative  Musculoskeletal: Negative  Skin: Negative  Allergic/Immunologic: Negative  Neurological: Negative  Psychiatric/Behavioral: Negative  Smoking history: She reports that she quit smoking about 22 years ago   She started smoking about 52 years ago  She has a 7 50 pack-year smoking history  She has never used smokeless tobacco     The following portions of the patient's history were reviewed and updated as appropriate: allergies, current medications, past family history, past medical history, past social history, past surgical history and problem list     Immunization History   Administered Date(s) Administered    COVID-19 MODERNA VACC 0 5 ML IM 02/08/2021, 03/09/2021, 10/24/2021, 04/08/2022    Influenza Split High Dose Preservative Free IM 09/12/2012    Influenza, seasonal, injectable 1941    Pneumococcal Conjugate 13-Valent 03/23/2016    Pneumococcal Polysaccharide PPV23 1941, 01/01/2014    Tdap 1940, 1941, 09/26/2016    Zoster 1940, 1941     Current Outpatient Medications   Medication Sig Dispense Refill    albuterol (ACCUNEB) 0 63 MG/3ML nebulizer solution Take 3 mL (0 63 mg total) by nebulization every 6 (six) hours as needed for wheezing or shortness of breath 1080 mL 3    albuterol (PROVENTIL HFA,VENTOLIN HFA) 90 mcg/act inhaler Inhale 1 puff every 6 (six) hours as needed for wheezing 3 Inhaler 5    ARIPiprazole (ABILIFY) 5 mg tablet aripiprazole 5 mg tablet   TAKE 1 TABLET BY MOUTH EVERY DAY      aspirin 81 mg chewable tablet Chew 81 mg daily      azelastine (ASTELIN) 0 1 % nasal spray 1 spray into each nostril 2 (two) times a day Use in each nostril as directed 1 Bottle 3    B Complex-Folic Acid (B COMPLEX FORMULA 1) TABS Take by mouth      benzonatate (TESSALON PERLES) 100 mg capsule Take 100 mg by mouth 3 (three) times a day as needed for cough      budesonide-formoterol (Symbicort) 160-4 5 mcg/act inhaler Inhale 2 puffs 2 (two) times a day Rinse mouth after use   30 6 g 2    celecoxib (CeleBREX) 200 mg capsule celecoxib 200 mg capsule      ciprofloxacin (CIPRO) 500 mg tablet ciprofloxacin 500 mg tablet   TAKE 1 TABLET BY MOUTH EVERY 12 HOURS FOR 5 DAYS      diazepam (VALIUM) 5 mg tablet diazepam 5 mg tablet      doxycycline hyclate (VIBRAMYCIN) 100 mg capsule doxycycline hyclate 100 mg capsule      famotidine (PEPCID) 20 mg tablet Take 1 tablet (20 mg total) by mouth daily 20 tablet 0    famotidine (PEPCID) 40 MG tablet famotidine 40 mg tablet      FLUoxetine (PROzac) 20 mg capsule fluoxetine 20 mg capsule      FLUoxetine (PROzac) 40 MG capsule Take 40 mg by mouth daily      fluticasone (FLONASE) 50 mcg/act nasal spray fluticasone propionate 50 mcg/actuation nasal spray,suspension   SPRAY 1 SPRAY INTO EACH NOSTRIL EVERY DAY      hydrochlorothiazide (HYDRODIURIL) 25 mg tablet Take 25 mg by mouth daily      HYDROcodone-acetaminophen (NORCO) 5-325 mg per tablet hydrocodone 5 mg-acetaminophen 325 mg tablet      ibuprofen (MOTRIN) 600 mg tablet ibuprofen 600 mg tablet      ibuprofen (MOTRIN) 800 mg tablet Take 1 tablet (800 mg total) by mouth 3 (three) times a day 21 tablet 0    ipratropium (ATROVENT) 0 06 % nasal spray ipratropium bromide 42 mcg (0 06 %) nasal spray      levothyroxine 50 mcg tablet Take 50 mcg by mouth daily      levothyroxine 50 mcg tablet levothyroxine 50 mcg tablet      LORazepam (ATIVAN) 0 5 mg tablet lorazepam 0 5 mg tablet      meloxicam (MOBIC) 15 mg tablet meloxicam 15 mg tablet      montelukast (SINGULAIR) 10 mg tablet Take 1 tablet (10 mg total) by mouth daily at bedtime 90 tablet 3    montelukast (SINGULAIR) 10 mg tablet montelukast 10 mg tablet      pantoprazole (PROTONIX) 40 mg tablet Take 40 mg by mouth daily      Paxlovid tablet therapy pack PLEASE SEE ATTACHED FOR DETAILED DIRECTIONS      sodium chloride (OCEAN) 0 65 % nasal spray 1 spray into each nostril as needed for congestion or rhinitis 15 mL 3    sucralfate (CARAFATE) 1 g tablet Take 1 tablet (1 g total) by mouth 4 (four) times a day Before meals and before bed as needed for reflux 90 tablet 0    budesonide-formoterol (SYMBICORT) 160-4 5 mcg/act inhaler       clotrimazole (MYCELEX) 10 mg james Take 1 tablet (10 mg total) by mouth 5 (five) times a day 70 tablet 0    LORazepam (ATIVAN) 0 5 mg tablet       simvastatin (ZOCOR) 10 mg tablet Take 10 mg by mouth daily at bedtime      Zoster Vaccine Live 01075 UNT/0 65ML SUSR        No current facility-administered medications for this visit  Allergies: Seasonal ic [cholestatin] and Ascorbate - food allergy    Objective:  Vitals:    08/16/22 1428 08/16/22 1432   BP: 128/72    BP Location: Right arm    Patient Position: Sitting    Cuff Size: Large    Pulse: 72    Resp: 18    SpO2: 95% 95%   Weight: 82 1 kg (181 lb)    Height: 5' 2" (1 575 m)    Oxygen Therapy  SpO2: 95 %  Oxygen Therapy: None (Room air)    Wt Readings from Last 3 Encounters:   08/16/22 82 1 kg (181 lb)   07/27/22 87 5 kg (193 lb)   10/25/21 87 5 kg (193 lb)     Body mass index is 33 11 kg/m²  Physical Exam  Vitals reviewed  Constitutional:       General: She is not in acute distress  Appearance: Normal appearance  She is obese  She is not ill-appearing  HENT:      Head: Normocephalic and atraumatic  Mouth/Throat:      Pharynx: Oropharynx is clear  Eyes:      Conjunctiva/sclera: Conjunctivae normal    Cardiovascular:      Rate and Rhythm: Normal rate and regular rhythm  Pulmonary:      Effort: Pulmonary effort is normal  No respiratory distress  Breath sounds: Normal breath sounds  No decreased breath sounds, wheezing, rhonchi or rales  Abdominal:      General: Abdomen is flat  There is no distension  Musculoskeletal:         General: Normal range of motion  Cervical back: Normal range of motion  Right lower leg: No edema  Left lower leg: No edema  Skin:     General: Skin is warm and dry  Neurological:      Mental Status: She is alert and oriented to person, place, and time     Psychiatric:         Mood and Affect: Mood normal          Behavior: Behavior normal          Lab Review:   Lab Results   Component Value Date  05/19/2015    K 3 9 07/27/2022    K 4 3 05/19/2015     07/27/2022     05/19/2015    CO2 25 07/27/2022    CO2 29 8 05/19/2015    BUN 16 07/27/2022    BUN 19 05/19/2015    CREATININE 0 84 07/27/2022    CREATININE 0 7 05/19/2015    GLUCOSE 107 (H) 05/19/2015    CALCIUM 8 8 07/27/2022    CALCIUM 9 1 05/19/2015     Lab Results   Component Value Date    WBC 6 53 07/27/2022    WBC 4 7 05/19/2015    HGB 14 3 07/27/2022    HGB 15 2 05/19/2015    HCT 43 0 07/27/2022    HCT 44 8 05/19/2015    MCV 91 07/27/2022    MCV 89 05/19/2015     07/27/2022     05/19/2015       Diagnostics:  I have personally reviewed pertinent reports  Reviewed chest x-ray  Office Spirometry Results:     ESS:    X-ray chest 1 view portable    Result Date: 7/27/2022  Narrative: CHEST INDICATION:   chest pain  COMPARISON:  6/16/2022 EXAM PERFORMED/VIEWS:  XR CHEST PORTABLE FINDINGS: Cardiomediastinal silhouette appears unremarkable  The lungs are clear  Mild elevation right hemidiaphragm as before No pneumothorax or pleural effusion  Osseous structures appear within normal limits for patient age  Impression: No acute cardiopulmonary disease   Workstation performed: [id number]

## 2022-08-23 ENCOUNTER — TELEPHONE (OUTPATIENT)
Dept: PULMONOLOGY | Facility: CLINIC | Age: 82
End: 2022-08-23

## 2022-09-30 ENCOUNTER — NURSE TRIAGE (OUTPATIENT)
Dept: OTHER | Facility: OTHER | Age: 82
End: 2022-09-30

## 2022-09-30 ENCOUNTER — TELEPHONE (OUTPATIENT)
Dept: PULMONOLOGY | Facility: CLINIC | Age: 82
End: 2022-09-30

## 2022-09-30 DIAGNOSIS — J20.9 ACUTE TRACHEOBRONCHITIS: Primary | ICD-10-CM

## 2022-09-30 RX ORDER — AZITHROMYCIN 250 MG/1
TABLET, FILM COATED ORAL
Qty: 6 TABLET | Refills: 0 | Status: SHIPPED | OUTPATIENT
Start: 2022-09-30 | End: 2022-10-07

## 2022-09-30 NOTE — TELEPHONE ENCOUNTER
Pt has LVM requesting a zpack to be sent into her pharmacy as she has been feeling sick lately  I have attempted to contact the pt back to go over exactly what she is feeling and did not get through   Please advise

## 2022-09-30 NOTE — TELEPHONE ENCOUNTER
Patient reports she is getting sick and it is going into her chest and bronchial tubes  She denies difficulty breathing and is requesting a Z-pack  She would like a call back to advise  Reason for Disposition   Colds with no complications    Answer Assessment - Initial Assessment Questions  1  ONSET: "When did the nasal discharge start?"       9/29/22  2  AMOUNT: "How much discharge is there?"       Mild-moderate (post nasal drip)   3  COUGH: "Do you have a cough?" If yes, ask: "Describe the color of your sputum" (clear, white, yellow, green)      Mild   4  RESPIRATORY DISTRESS: "Describe your breathing "       Tracheal, chest congestion   5  FEVER: "Do you have a fever?" If Yes, ask: "What is your temperature, how was it measured, and when did it start?"      Denies   6  SEVERITY: "Overall, how bad are you feeling right now?" (e g , doesn't interfere with normal activities, staying home from school/work, staying in bed)       Sick   7  OTHER SYMPTOMS: "Do you have any other symptoms?" (e g , sore throat, earache, wheezing, vomiting)      Post nasal drip, congestion in bronchial tubes and chest   8   PREGNANCY: "Is there any chance you are pregnant?" "When was your last menstrual period?"      N/A    Protocols used: COMMON COLD-ADULT-[place]

## 2022-10-01 ENCOUNTER — NURSE TRIAGE (OUTPATIENT)
Dept: OTHER | Facility: OTHER | Age: 82
End: 2022-10-01

## 2022-10-01 NOTE — TELEPHONE ENCOUNTER
Reason for Disposition   [1] MODERATE difficulty breathing (e g , speaks in phrases, SOB even at rest, pulse 100-120) AND [2] still present when not coughing    Answer Assessment - Initial Assessment Questions  1  ONSET: "When did the cough begin?"       Yesterday   2  SEVERITY: "How bad is the cough today?"       Persisted   3  SPUTUM: "Describe the color of your sputum" (none, dry cough; clear, white, yellow, green)      Dry   5  DIFFICULTY BREATHING: "Are you having difficulty breathing?" If Yes, ask: "How bad is it?" (e g , mild, moderate, severe)     - MILD: No SOB at rest, mild SOB with walking, speaks normally in sentences, can lay down, no retractions, pulse < 100      - MODERATE: SOB at rest, SOB with minimal exertion and prefers to sit, cannot lie down flat, speaks in phrases, mild retractions, audible wheezing, pulse 100-120      - SEVERE: Very SOB at rest, speaks in single words, struggling to breathe, sitting hunched forward, retractions, pulse > 120       Moderate   6  FEVER: "Do you have a fever?" If Yes, ask: "What is your temperature, how was it measured, and when did it start?"      Unable to assess, no thermometer   7  CARDIAC HISTORY: "Do you have any history of heart disease?" (e g , heart attack, congestive heart failure)       No   8  LUNG HISTORY: "Do you have any history of lung disease?"  (e g , pulmonary embolus, asthma, emphysema)      COPD   9  PE RISK FACTORS: "Do you have a history of blood clots?" (or: recent major surgery, recent prolonged travel, bedridden)      No   10  OTHER SYMPTOMS: "Do you have any other symptoms?" (e g , runny nose, wheezing, chest pain)        Wheezing   11  PREGNANCY: "Is there any chance you are pregnant?" "When was your last menstrual period?"        N/A   12   TRAVEL: "Have you traveled out of the country in the last month?" (e g , travel history, exposures)        No    Protocols used: COUGH - ACUTE PRODUCTIVE-ADULT-AH

## 2022-10-01 NOTE — TELEPHONE ENCOUNTER
Regarding: Nebulizer machine/worsening symptoms  ----- Message from Nhan Moore sent at 10/1/2022  8:20 AM EDT -----  "My nebulizer machine is broken and I am having worsening symptoms since yesterday "

## 2022-10-03 DIAGNOSIS — J45.901 ASTHMA WITH COPD WITH EXACERBATION: Primary | ICD-10-CM

## 2022-10-03 DIAGNOSIS — J44.1 ASTHMA WITH COPD WITH EXACERBATION: Primary | ICD-10-CM

## 2022-10-03 RX ORDER — PREDNISONE 20 MG/1
40 TABLET ORAL DAILY
Qty: 10 TABLET | Refills: 0 | Status: SHIPPED | OUTPATIENT
Start: 2022-10-03 | End: 2022-10-07

## 2022-10-03 NOTE — TELEPHONE ENCOUNTER
Pt left VM that she finished her antibiotics and thought she was feeling better but now she is feeling worse  Please advise

## 2022-10-03 NOTE — TELEPHONE ENCOUNTER
Please elt her know I sent prednisone and ordered a chest x-ray for her  She can walk in for the CXR

## 2022-10-04 ENCOUNTER — APPOINTMENT (EMERGENCY)
Dept: CT IMAGING | Facility: HOSPITAL | Age: 82
DRG: 103 | End: 2022-10-04
Payer: MEDICARE

## 2022-10-04 ENCOUNTER — APPOINTMENT (OUTPATIENT)
Dept: RADIOLOGY | Facility: HOSPITAL | Age: 82
DRG: 103 | End: 2022-10-04
Payer: MEDICARE

## 2022-10-04 ENCOUNTER — HOSPITAL ENCOUNTER (INPATIENT)
Facility: HOSPITAL | Age: 82
LOS: 2 days | Discharge: HOME | DRG: 103 | End: 2022-10-07
Attending: EMERGENCY MEDICINE | Admitting: STUDENT IN AN ORGANIZED HEALTH CARE EDUCATION/TRAINING PROGRAM
Payer: MEDICARE

## 2022-10-04 DIAGNOSIS — J32.1 FRONTAL SINUSITIS: ICD-10-CM

## 2022-10-04 DIAGNOSIS — R51.9 ACUTE NONINTRACTABLE HEADACHE, UNSPECIFIED HEADACHE TYPE: Primary | ICD-10-CM

## 2022-10-04 PROBLEM — K21.9 GERD (GASTROESOPHAGEAL REFLUX DISEASE): Status: ACTIVE | Noted: 2022-10-04

## 2022-10-04 PROBLEM — E78.5 HLD (HYPERLIPIDEMIA): Status: ACTIVE | Noted: 2022-10-04

## 2022-10-04 LAB
ALBUMIN SERPL BCP-MCNC: 3.9 G/DL (ref 3.5–5)
ALP SERPL-CCNC: 116 U/L (ref 46–116)
ALT SERPL W P-5'-P-CCNC: 31 U/L (ref 12–78)
ANION GAP SERPL CALCULATED.3IONS-SCNC: 10 MMOL/L (ref 4–13)
AST SERPL W P-5'-P-CCNC: 24 U/L (ref 5–45)
BASOPHILS NFR BLD AUTO: 0 % (ref 0–1)
BILIRUB SERPL-MCNC: 0.44 MG/DL (ref 0.2–1)
BUN SERPL-MCNC: 10 MG/DL (ref 5–25)
CALCIUM SERPL-MCNC: 9.2 MG/DL (ref 8.3–10.1)
CHLORIDE SERPL-SCNC: 102 MMOL/L (ref 96–108)
CO2 SERPL-SCNC: 26 MMOL/L (ref 21–32)
CREAT SERPL-MCNC: 0.81 MG/DL (ref 0.6–1.3)
EOSINOPHIL NFR BLD AUTO: 0 % (ref 0–6)
ERYTHROCYTE [DISTWIDTH] IN BLOOD BY AUTOMATED COUNT: 13.8 % (ref 11.6–15.1)
FLUAV RNA RESP QL NAA+PROBE: NEGATIVE
FLUBV RNA RESP QL NAA+PROBE: NEGATIVE
GFR SERPL CREATININE-BSD FRML MDRD: 68 ML/MIN/1.73SQ M
GLUCOSE P FAST SERPL-MCNC: 126 MG/DL (ref 65–99)
GLUCOSE SERPL-MCNC: 126 MG/DL (ref 65–140)
HCT VFR BLD AUTO: 43.4 % (ref 34.8–46.1)
HGB BLD-MCNC: 14.3 G/DL (ref 11.5–15.4)
IMM GRANULOCYTES NFR BLD AUTO: 0 % (ref 0–2)
LYMPHOCYTES NFR BLD AUTO: 12 % (ref 14–44)
MCH RBC QN AUTO: 30 PG (ref 26.8–34.3)
MCHC RBC AUTO-ENTMCNC: 32.9 G/DL (ref 31.4–37.4)
MCV RBC AUTO: 91 FL (ref 82–98)
MONOCYTES NFR BLD AUTO: 5 % (ref 4–12)
NEUTS SEG NFR BLD AUTO: 83 % (ref 43–75)
NRBC BLD AUTO-RTO: 0 /100 WBCS
PLATELET # BLD AUTO: 217 THOUSANDS/UL (ref 149–390)
PMV BLD AUTO: 9.4 FL (ref 8.9–12.7)
POTASSIUM SERPL-SCNC: 4.1 MMOL/L (ref 3.5–5.3)
PROT SERPL-MCNC: 7.3 G/DL (ref 6.4–8.4)
RBC # BLD AUTO: 4.77 MILLION/UL (ref 3.81–5.12)
RSV RNA RESP QL NAA+PROBE: NEGATIVE
SARS-COV-2 RNA RESP QL NAA+PROBE: NEGATIVE
SODIUM SERPL-SCNC: 138 MMOL/L (ref 135–147)
WBC # BLD AUTO: 4.51 THOUSAND/UL (ref 4.31–10.16)

## 2022-10-04 PROCEDURE — 99220 PR INITIAL OBSERVATION CARE/DAY 70 MINUTES: CPT | Performed by: STUDENT IN AN ORGANIZED HEALTH CARE EDUCATION/TRAINING PROGRAM

## 2022-10-04 PROCEDURE — 71045 X-RAY EXAM CHEST 1 VIEW: CPT

## 2022-10-04 PROCEDURE — 36415 COLL VENOUS BLD VENIPUNCTURE: CPT | Performed by: PHYSICIAN ASSISTANT

## 2022-10-04 PROCEDURE — 85025 COMPLETE CBC W/AUTO DIFF WBC: CPT | Performed by: PHYSICIAN ASSISTANT

## 2022-10-04 PROCEDURE — G1004 CDSM NDSC: HCPCS

## 2022-10-04 PROCEDURE — 99285 EMERGENCY DEPT VISIT HI MDM: CPT | Performed by: PHYSICIAN ASSISTANT

## 2022-10-04 PROCEDURE — 96372 THER/PROPH/DIAG INJ SC/IM: CPT

## 2022-10-04 PROCEDURE — 80053 COMPREHEN METABOLIC PANEL: CPT | Performed by: PHYSICIAN ASSISTANT

## 2022-10-04 PROCEDURE — 99285 EMERGENCY DEPT VISIT HI MDM: CPT

## 2022-10-04 PROCEDURE — 0241U HB NFCT DS VIR RESP RNA 4 TRGT: CPT | Performed by: PHYSICIAN ASSISTANT

## 2022-10-04 PROCEDURE — 70450 CT HEAD/BRAIN W/O DYE: CPT

## 2022-10-04 RX ORDER — LORAZEPAM 0.5 MG/1
0.5 TABLET ORAL 2 TIMES DAILY PRN
Status: DISCONTINUED | OUTPATIENT
Start: 2022-10-04 | End: 2022-10-07 | Stop reason: HOSPADM

## 2022-10-04 RX ORDER — ASPIRIN 81 MG/1
81 TABLET, CHEWABLE ORAL DAILY
Status: DISCONTINUED | OUTPATIENT
Start: 2022-10-04 | End: 2022-10-07 | Stop reason: HOSPADM

## 2022-10-04 RX ORDER — METHYLPREDNISOLONE SODIUM SUCCINATE 125 MG/2ML
125 INJECTION, POWDER, LYOPHILIZED, FOR SOLUTION INTRAMUSCULAR; INTRAVENOUS ONCE
Status: COMPLETED | OUTPATIENT
Start: 2022-10-04 | End: 2022-10-04

## 2022-10-04 RX ORDER — ACETAMINOPHEN 325 MG/1
650 TABLET ORAL EVERY 6 HOURS PRN
Status: DISCONTINUED | OUTPATIENT
Start: 2022-10-04 | End: 2022-10-07 | Stop reason: HOSPADM

## 2022-10-04 RX ORDER — ENOXAPARIN SODIUM 100 MG/ML
40 INJECTION SUBCUTANEOUS DAILY
Status: DISCONTINUED | OUTPATIENT
Start: 2022-10-04 | End: 2022-10-07 | Stop reason: HOSPADM

## 2022-10-04 RX ORDER — ALBUTEROL SULFATE 90 UG/1
2 AEROSOL, METERED RESPIRATORY (INHALATION) ONCE
Status: COMPLETED | OUTPATIENT
Start: 2022-10-04 | End: 2022-10-04

## 2022-10-04 RX ORDER — HYDROCHLOROTHIAZIDE 25 MG/1
25 TABLET ORAL DAILY
Status: DISCONTINUED | OUTPATIENT
Start: 2022-10-04 | End: 2022-10-07 | Stop reason: HOSPADM

## 2022-10-04 RX ORDER — ARIPIPRAZOLE 5 MG/1
5 TABLET ORAL DAILY
Status: DISCONTINUED | OUTPATIENT
Start: 2022-10-04 | End: 2022-10-07 | Stop reason: HOSPADM

## 2022-10-04 RX ORDER — FLUOXETINE HYDROCHLORIDE 20 MG/1
40 CAPSULE ORAL 2 TIMES DAILY
Status: DISCONTINUED | OUTPATIENT
Start: 2022-10-04 | End: 2022-10-07 | Stop reason: HOSPADM

## 2022-10-04 RX ORDER — SUCRALFATE 1 G/1
1 TABLET ORAL 4 TIMES DAILY
Status: DISCONTINUED | OUTPATIENT
Start: 2022-10-04 | End: 2022-10-07 | Stop reason: HOSPADM

## 2022-10-04 RX ORDER — GUAIFENESIN/DEXTROMETHORPHAN 100-10MG/5
10 SYRUP ORAL EVERY 4 HOURS PRN
Status: DISCONTINUED | OUTPATIENT
Start: 2022-10-04 | End: 2022-10-07 | Stop reason: HOSPADM

## 2022-10-04 RX ORDER — FAMOTIDINE 20 MG/1
20 TABLET, FILM COATED ORAL DAILY
Status: DISCONTINUED | OUTPATIENT
Start: 2022-10-04 | End: 2022-10-07 | Stop reason: HOSPADM

## 2022-10-04 RX ORDER — IBUPROFEN 400 MG/1
400 TABLET ORAL EVERY 6 HOURS PRN
Status: DISCONTINUED | OUTPATIENT
Start: 2022-10-04 | End: 2022-10-07 | Stop reason: HOSPADM

## 2022-10-04 RX ORDER — LEVOTHYROXINE SODIUM 0.05 MG/1
50 TABLET ORAL
Status: DISCONTINUED | OUTPATIENT
Start: 2022-10-05 | End: 2022-10-07 | Stop reason: HOSPADM

## 2022-10-04 RX ORDER — BUDESONIDE AND FORMOTEROL FUMARATE DIHYDRATE 160; 4.5 UG/1; UG/1
2 AEROSOL RESPIRATORY (INHALATION) 2 TIMES DAILY
Status: DISCONTINUED | OUTPATIENT
Start: 2022-10-04 | End: 2022-10-07 | Stop reason: HOSPADM

## 2022-10-04 RX ORDER — BENZONATATE 100 MG/1
100 CAPSULE ORAL 3 TIMES DAILY PRN
Status: DISCONTINUED | OUTPATIENT
Start: 2022-10-04 | End: 2022-10-07 | Stop reason: HOSPADM

## 2022-10-04 RX ORDER — PRAVASTATIN SODIUM 20 MG
20 TABLET ORAL
Status: DISCONTINUED | OUTPATIENT
Start: 2022-10-04 | End: 2022-10-07 | Stop reason: HOSPADM

## 2022-10-04 RX ORDER — KETOROLAC TROMETHAMINE 30 MG/ML
15 INJECTION, SOLUTION INTRAMUSCULAR; INTRAVENOUS ONCE
Status: COMPLETED | OUTPATIENT
Start: 2022-10-04 | End: 2022-10-04

## 2022-10-04 RX ADMIN — SUCRALFATE 1 G: 1 TABLET ORAL at 11:46

## 2022-10-04 RX ADMIN — BENZONATATE 100 MG: 100 CAPSULE ORAL at 12:45

## 2022-10-04 RX ADMIN — ASPIRIN 81 MG: 81 TABLET, CHEWABLE ORAL at 08:38

## 2022-10-04 RX ADMIN — KETOROLAC TROMETHAMINE 15 MG: 30 INJECTION, SOLUTION INTRAMUSCULAR at 06:10

## 2022-10-04 RX ADMIN — SUCRALFATE 1 G: 1 TABLET ORAL at 21:37

## 2022-10-04 RX ADMIN — FAMOTIDINE 20 MG: 20 TABLET ORAL at 08:38

## 2022-10-04 RX ADMIN — GUAIFENESIN AND DEXTROMETHORPHAN 10 ML: 100; 10 SYRUP ORAL at 23:57

## 2022-10-04 RX ADMIN — ENOXAPARIN SODIUM 40 MG: 40 INJECTION SUBCUTANEOUS at 15:50

## 2022-10-04 RX ADMIN — BUDESONIDE AND FORMOTEROL FUMARATE DIHYDRATE 2 PUFF: 160; 4.5 AEROSOL RESPIRATORY (INHALATION) at 18:25

## 2022-10-04 RX ADMIN — FLUOXETINE HYDROCHLORIDE 40 MG: 20 CAPSULE ORAL at 21:37

## 2022-10-04 RX ADMIN — SUCRALFATE 1 G: 1 TABLET ORAL at 18:25

## 2022-10-04 RX ADMIN — PRAVASTATIN SODIUM 20 MG: 20 TABLET ORAL at 15:50

## 2022-10-04 RX ADMIN — ALBUTEROL SULFATE 2 PUFF: 90 AEROSOL, METERED RESPIRATORY (INHALATION) at 06:10

## 2022-10-04 RX ADMIN — METHYLPREDNISOLONE SODIUM SUCCINATE 125 MG: 125 INJECTION, POWDER, FOR SOLUTION INTRAMUSCULAR; INTRAVENOUS at 07:53

## 2022-10-04 RX ADMIN — BENZONATATE 100 MG: 100 CAPSULE ORAL at 18:30

## 2022-10-04 NOTE — ASSESSMENT & PLAN NOTE
Present on admission with hypothyroidism  TSH within normal limits 1 60 in 04/2022  Levothyroxine 50 mcg daily

## 2022-10-04 NOTE — ED NOTES
Patient morning meds updated in the computer  [facility] provider made aware via melecio Reno RN  10/04/22 [id number]

## 2022-10-04 NOTE — H&P
[address] [facility] 1940[facility] 81 [facility]  [facility] MRN: [id number]  Unit/Bed#: F0T9 Encounter: [id number]  Primary Care Provider: Olayinka Goodwin MD   Date and time admitted to hospital: 10/4/2022  5:31 AM    * Headache  Assessment & Plan  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 is, chronic opacification of bilateral frontal sinuses with partial dehiscence of the posterior wall, finding also concerning of posterior extension of left frontal opacity and recommended MRI with and without contrast   As per ED report case was discussed with ENT who recommended inpatient observation with inpatient MRI  On my encounter patient appears comfortable not in distress  Currently she does report having persistent headache however does report that headache improved intensity while she is in the emergency room  Denies neck stiffness, fever, chills, visual disturbances  Continue Tylenol, Motrin intermittently  Continue with IV hydration  Follow-up with brain MRI with and without contrast pain  Follow-up with inpatient ENT consult  Continue supportive care  GERD (gastroesophageal reflux disease)  Assessment & Plan  Present admission with history of GERD  Resume home dose of PPI, Carafate  HLD (hyperlipidemia)  Assessment & Plan  Present on admission with history of hyperlipidemia  resume home dose of statin  CHUYITA (obstructive sleep apnea)  Assessment & Plan  Present on admission with history of sleep apnea  CPAP q h s      Hypothyroidism  Assessment & Plan  Present on admission with hypothyroidism  TSH within normal limits 1 60 in 04/2022  Levothyroxine 50 mcg daily  VTE Pharmacologic Prophylaxis: VTE Score: 4 Moderate Risk (Score 3-4) - Pharmacological DVT Prophylaxis Ordered: enoxaparin (Lovenox)  Code Status: Level 1 - Full Code       Anticipated Length of Stay: Patient will be admitted on an observation basis with an anticipated length of stay of less than 2 midnights secondary to Left frontal headache  Total Time for Visit, including Counseling / Coordination of Care: 60 minutes Greater than 50% of this total time spent on direct patient counseling and coordination of care  Chief Complaint:  Left frontal headache    History of Present Illness:  Rafael Jones is a 81 y o  female with a PMH of sleep apnea, asthma, hypothyroidism, hyperlipidemia, who presents with sudden onset of left frontal headache  Patient reported her symptoms started around 3:00 a m  This morning  She does report having remote history of migraine headaches in her young age however does not report having typical headaches like this episode  Reports headache located primarily at left frontal area without visual disturbances  Patient reports that her headache was not getting better with Tylenol and aspirin at home  Patient was given Tylenol, Toradol, IV Solu-Medrol in emergency room with some relief  My encounter patient appears comfortable not in distress  Reports headache has improved however still persistent  Denies fever, chills, neck stiffness, nausea, vomiting, visual disturbance, any other symptoms  No other events reported  Review of Systems:  Review of Systems   Constitutional: Negative for diaphoresis, fatigue and fever  HENT: Negative for congestion  Eyes: Negative for visual disturbance  Respiratory: Negative for cough and shortness of breath  Cardiovascular: Negative for palpitations  Gastrointestinal: Negative for abdominal distention, nausea and vomiting  Endocrine: Negative for polyuria  Genitourinary: Negative for dysuria  Musculoskeletal: Negative for neck stiffness  Neurological: Negative for weakness  Psychiatric/Behavioral: Negative for agitation  Past Medical and Surgical History:   Past Medical History:   Diagnosis Date    Anxiety     COPD (chronic obstructive pulmonary disease) ([facility] [address] )     Depression     Disease of thyroid gland     Hyperlipidemia     Hypotension     Wrist fracture        Past Surgical History:   Procedure Laterality Date    BACK SURGERY  2017    INCONTINENCE SURGERY      NEPHRECTOMY TRANSPLANTED ORGAN      REPLACEMENT TOTAL KNEE BILATERAL         Meds/Allergies:  Prior to Admission medications    Medication Sig Start Date End Date Taking?  Authorizing Provider   aspirin 81 mg chewable tablet Chew 81 mg daily   Yes Historical Provider, MD   budesonide-formoterol (SYMBICORT) 160-4 5 mcg/act inhaler    Yes Historical Provider, MD   famotidine (PEPCID) 20 mg tablet Take 1 tablet (20 mg total) by mouth daily 7/27/22  Yes Vanessa Rodriguez DO   FLUoxetine (PROzac) 20 mg capsule fluoxetine 20 mg capsule   Yes Historical Provider, MD   FLUoxetine (PROzac) 40 MG capsule Take 40 mg by mouth daily   Yes Historical Provider, MD   levothyroxine 50 mcg tablet Take 50 mcg by mouth daily   Yes Historical Provider, MD   montelukast (SINGULAIR) 10 mg tablet Take 1 tablet (10 mg total) by mouth daily at bedtime 4/5/21  Yes Ella Kamara PA-C   montelukast (SINGULAIR) 10 mg tablet montelukast 10 mg tablet   Yes Historical Provider, MD   pantoprazole (PROTONIX) 40 mg tablet Take 40 mg by mouth daily   Yes Historical Provider, MD   predniSONE 20 mg tablet Take 2 tablets (40 mg total) by mouth daily for 5 days 10/3/22 10/8/22 Yes Ella Kamara PA-C   simvastatin (ZOCOR) 10 mg tablet Take 10 mg by mouth daily at bedtime   Yes Historical Provider, MD   albuterol (ACCUNEB) 0 63 MG/3ML nebulizer solution Take 3 mL (0 63 mg total) by nebulization every 6 (six) hours as needed for wheezing or shortness of breath  Patient not taking: Reported on 10/4/2022 10/2/20   Bharat Serna PA-C   albuterol (PROVENTIL HFA,VENTOLIN HFA) 90 mcg/act inhaler Inhale 1 puff every 6 (six) hours as needed for wheezing  Patient not taking: Reported on 10/4/2022 9/29/20   Bharat Serna PA-C   ARIPiprazole (ABILIFY) 5 mg tablet aripiprazole 5 mg tablet   TAKE 1 TABLET BY MOUTH EVERY DAY  Patient not taking: Reported on 10/4/2022    Historical Provider, MD   azelastine (ASTELIN) 0 1 % nasal spray 1 spray into each nostril 2 (two) times a day Use in each nostril as directed  Patient not taking: Reported on 10/4/2022 3/23/21   Reyes Adams PA-C   azithromycin (ZITHROMAX) 250 mg tablet Take 2 tablets today then 1 tablet daily x 4 days  Patient not taking: Reported on 10/4/2022 9/30/22 10/4/22  Reyes Adams PA-C   B Complex-Folic Acid (B COMPLEX FORMULA 1) TABS Take by mouth  Patient not taking: Reported on 10/4/2022    Historical Provider, MD   benzonatate (TESSALON PERLES) 100 mg capsule Take 100 mg by mouth 3 (three) times a day as needed for cough  Patient not taking: Reported on 10/4/2022    Historical Provider, MD   budesonide-formoterol (Symbicort) 160-4 5 mcg/act inhaler Inhale 2 puffs 2 (two) times a day Rinse mouth after use  2/28/22   Reyes Adams PA-C   celecoxib (CeleBREX) 200 mg capsule celecoxib 200 mg capsule  Patient not taking: Reported on 10/4/2022    Historical Provider, MD   ciprofloxacin (CIPRO) 500 mg tablet ciprofloxacin 500 mg tablet   TAKE 1 TABLET BY MOUTH EVERY 12 HOURS FOR 5 DAYS  Patient not taking: Reported on 10/4/2022    Historical Provider, MD   clotrimazole (MYCELEX) 10 mg james Take 1 tablet (10 mg total) by mouth 5 (five) times a day  Patient not taking: Reported on 10/4/2022 4/4/19   Bharat Serna PA-C   diazepam (VALIUM) 5 mg tablet diazepam 5 mg tablet  Patient not taking: Reported on 10/4/2022 Historical Provider, MD   doxycycline hyclate (VIBRAMYCIN) 100 mg capsule doxycycline hyclate 100 mg capsule  Patient not taking: Reported on 10/4/2022    Historical Provider, MD   famotidine (PEPCID) 40 MG tablet famotidine 40 mg tablet 4/20/22   Historical Provider, MD   fluticasone (FLONASE) 50 mcg/act nasal spray fluticasone propionate 50 mcg/actuation nasal spray,suspension   SPRAY 1 SPRAY INTO EACH NOSTRIL EVERY DAY  Patient not taking: Reported on 10/4/2022    Historical Provider, MD   hydrochlorothiazide (HYDRODIURIL) 25 mg tablet Take 25 mg by mouth daily  Patient not taking: Reported on 10/4/2022    Historical Provider, MD   HYDROcodone-acetaminophen (NORCO) 5-325 mg per tablet hydrocodone 5 mg-acetaminophen 325 mg tablet  Patient not taking: Reported on 10/4/2022    Historical Provider, MD   ibuprofen (MOTRIN) 600 mg tablet ibuprofen 600 mg tablet  Patient not taking: Reported on 10/4/2022    Historical Provider, MD   ibuprofen (MOTRIN) 800 mg tablet Take 1 tablet (800 mg total) by mouth 3 (three) times a day  Patient not taking: Reported on 10/4/2022 6/27/21   Brad Cruz MD   ipratropium (ATROVENT) 0 06 % nasal spray ipratropium bromide 42 mcg (0 06 %) nasal spray  Patient not taking: Reported on 10/4/2022 6/15/22   Historical Provider, MD   levothyroxine 50 mcg tablet levothyroxine 50 mcg tablet    Historical Provider, MD   LORazepam (ATIVAN) 0 5 mg tablet  5/19/15   Historical Provider, MD   LORazepam (ATIVAN) 0 5 mg tablet lorazepam 0 5 mg tablet  Patient not taking: Reported on 10/4/2022    Historical Provider, MD   meloxicam (MOBIC) 15 mg tablet meloxicam 15 mg tablet  Patient not taking: Reported on 10/4/2022    Historical Provider, MD   Paxlovid tablet therapy pack PLEASE SEE ATTACHED FOR DETAILED DIRECTIONS  Patient not taking: Reported on 10/4/2022 6/15/22   Historical Provider, MD   sodium chloride (OCEAN) 0 65 % nasal spray 1 spray into each nostril as needed for congestion or rhinitis  Patient not taking: Reported on 10/4/2022 4/4/19   Valentina Batista PA-C   sucralfate (CARAFATE) 1 g tablet Take 1 tablet (1 g total) by mouth 4 (four) times a day Before meals and before bed as needed for reflux  Patient not taking: Reported on 10/4/2022 7/27/22   Graciela Moore, DO   Zoster Vaccine Live 03072 UNT/0 65ML SUSR     Historical Provider, MD     I have reviewed home medications with a medical source (PCP, Pharmacy, other)  Allergies: Allergies   Allergen Reactions    Seasonal Ic [Cholestatin] Fatigue    Ascorbate - Food Allergy Rash     Reports allergy to fresh fruit       Social History:  Marital Status:      Substance Use History:   Social History     Substance and Sexual Activity   Alcohol Use No     Social History     Tobacco Use   Smoking Status Former Smoker    Packs/day: 0 25    Years: 30 00    Pack years: 7 50    Start date: 1970   [facility] Quit date: 2000    Years since quittin 7   Smokeless Tobacco Never Used     Social History     Substance and Sexual Activity   Drug Use No       Family History:  History reviewed  No pertinent family history  Physical Exam:     Vitals:   Blood Pressure: 131/83 (10/04/22 1500)  Pulse: 93 (10/04/22 1500)  Temperature: 98 °F (36 7 °C) (10/04/22 0531)  Respirations: 18 (10/04/22 1500)  Weight - Scale: 82 1 kg (181 lb) (10/04/22 0531)  SpO2: 94 % (10/04/22 1500)    Physical Exam  Constitutional:       General: She is not in acute distress  Appearance: Normal appearance  She is not ill-appearing  HENT:      Head: Normocephalic and atraumatic  Eyes:      Pupils: Pupils are equal, round, and reactive to light  Comments: No tenderness noted on exam to the left frontal area of her head  Cardiovascular:      Rate and Rhythm: Normal rate  Pulses: Normal pulses  Heart sounds: Normal heart sounds  Pulmonary:      Effort: Pulmonary effort is normal  No respiratory distress        Breath sounds: Normal breath sounds  No stridor  No wheezing or rhonchi  Abdominal:      General: Bowel sounds are normal  There is no distension  Palpations: Abdomen is soft  Tenderness: There is no abdominal tenderness  Musculoskeletal:      Cervical back: Normal range of motion and neck supple  Neurological:      General: No focal deficit present  Mental Status: She is alert and oriented to person, place, and time  Mental status is at baseline  Psychiatric:         Mood and Affect: Mood normal          Behavior: Behavior normal          Additional Data:     Lab Results:  Results from last 7 days   Lab Units 10/04/22  0751   WBC Thousand/uL 4 51   HEMOGLOBIN g/dL 14 3   HEMATOCRIT % 43 4   PLATELETS Thousands/uL 217   NEUTROS PCT % 83*   LYMPHS PCT % 12*   MONOS PCT % 5   EOS PCT % 0     Results from last 7 days   Lab Units 10/04/22  0751   SODIUM mmol/L 138   POTASSIUM mmol/L 4 1   CHLORIDE mmol/L 102   CO2 mmol/L 26   BUN mg/dL 10   CREATININE mg/dL 0 81   ANION GAP mmol/L 10   CALCIUM mg/dL 9 2   ALBUMIN g/dL 3 9   TOTAL BILIRUBIN mg/dL 0 44   ALK PHOS U/L 116   ALT U/L 31   AST U/L 24   GLUCOSE RANDOM mg/dL 126                       Imaging: Reviewed radiology reports from this admission including: CT head  CT head without contrast   Final Result by Phil Escobar MD (10/04 1486)      No evidence of acute intracranial process  Chronic microangiopathy  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 of the posterior wall, left greater than right  There appears to be slightly increased posterior extension of the left frontal opacity compared to February 2019  Follow-up with    MRI with and without contrast and appropriate clinical consultation is advised  This study demonstrates a significant  finding and was documented as such in [facility] for liaison and referring practitioner notification         Workstation performed: [id number]         XR chest 1 view portable   Final Result by Regina Barth MD (10/04 0715)      No acute cardiopulmonary disease  Workstation performed: [id number]         MRI inpatient order    (Results Pending)         ** Please Note: This note has been constructed using a voice recognition system   **

## 2022-10-04 NOTE — ASSESSMENT & PLAN NOTE
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 is, chronic opacification of bilateral frontal sinuses with partial dehiscence of the posterior wall, finding also concerning of posterior extension of left frontal opacity and recommended MRI with and without contrast   As per ED report case was discussed with ENT who recommended inpatient observation with inpatient MRI  On my encounter patient appears comfortable not in distress  Currently she does report having persistent headache however does report that headache improved intensity while she is in the emergency room  Denies neck stiffness, fever, chills, visual disturbances  Continue Tylenol, Motrin intermittently  Continue with IV hydration  Follow-up with brain MRI with and without contrast pain  Follow-up with inpatient ENT consult  Continue supportive care

## 2022-10-04 NOTE — TELEPHONE ENCOUNTER
Pt called and lvm: She went to the hospital this morning, she had a very bad headache  She said she does not have pneumonia  She a lot of wheezing yet, was given steroids and will be there till tomorrow   She wanted to thank you and the staff for your care

## 2022-10-05 ENCOUNTER — APPOINTMENT (OUTPATIENT)
Dept: MRI IMAGING | Facility: HOSPITAL | Age: 82
DRG: 103 | End: 2022-10-05
Payer: MEDICARE

## 2022-10-05 LAB — ERYTHROCYTE [SEDIMENTATION RATE] IN BLOOD: 17 MM/HOUR (ref 0–29)

## 2022-10-05 PROCEDURE — 85652 RBC SED RATE AUTOMATED: CPT | Performed by: STUDENT IN AN ORGANIZED HEALTH CARE EDUCATION/TRAINING PROGRAM

## 2022-10-05 PROCEDURE — 70553 MRI BRAIN STEM W/O & W/DYE: CPT

## 2022-10-05 PROCEDURE — 99232 SBSQ HOSP IP/OBS MODERATE 35: CPT | Performed by: STUDENT IN AN ORGANIZED HEALTH CARE EDUCATION/TRAINING PROGRAM

## 2022-10-05 PROCEDURE — 94760 N-INVAS EAR/PLS OXIMETRY 1: CPT

## 2022-10-05 PROCEDURE — A9585 GADOBUTROL INJECTION: HCPCS | Performed by: STUDENT IN AN ORGANIZED HEALTH CARE EDUCATION/TRAINING PROGRAM

## 2022-10-05 PROCEDURE — G1004 CDSM NDSC: HCPCS

## 2022-10-05 RX ORDER — ALBUTEROL SULFATE 90 UG/1
2 AEROSOL, METERED RESPIRATORY (INHALATION) EVERY 4 HOURS PRN
Status: DISCONTINUED | OUTPATIENT
Start: 2022-10-05 | End: 2022-10-07 | Stop reason: HOSPADM

## 2022-10-05 RX ORDER — IPRATROPIUM BROMIDE AND ALBUTEROL SULFATE 2.5; .5 MG/3ML; MG/3ML
3 SOLUTION RESPIRATORY (INHALATION)
Status: DISCONTINUED | OUTPATIENT
Start: 2022-10-05 | End: 2022-10-05

## 2022-10-05 RX ADMIN — ARIPIPRAZOLE 5 MG: 5 TABLET ORAL at 08:24

## 2022-10-05 RX ADMIN — ENOXAPARIN SODIUM 40 MG: 40 INJECTION SUBCUTANEOUS at 08:25

## 2022-10-05 RX ADMIN — SUCRALFATE 1 G: 1 TABLET ORAL at 22:19

## 2022-10-05 RX ADMIN — ASPIRIN 81 MG: 81 TABLET, CHEWABLE ORAL at 08:24

## 2022-10-05 RX ADMIN — FLUOXETINE HYDROCHLORIDE 40 MG: 20 CAPSULE ORAL at 08:36

## 2022-10-05 RX ADMIN — GADOBUTROL 8 ML: 604.72 INJECTION INTRAVENOUS at 22:51

## 2022-10-05 RX ADMIN — BUDESONIDE AND FORMOTEROL FUMARATE DIHYDRATE 2 PUFF: 160; 4.5 AEROSOL RESPIRATORY (INHALATION) at 12:00

## 2022-10-05 RX ADMIN — BUDESONIDE AND FORMOTEROL FUMARATE DIHYDRATE 2 PUFF: 160; 4.5 AEROSOL RESPIRATORY (INHALATION) at 17:58

## 2022-10-05 RX ADMIN — SUCRALFATE 1 G: 1 TABLET ORAL at 08:24

## 2022-10-05 RX ADMIN — PRAVASTATIN SODIUM 20 MG: 20 TABLET ORAL at 17:58

## 2022-10-05 RX ADMIN — FAMOTIDINE 20 MG: 20 TABLET ORAL at 08:24

## 2022-10-05 RX ADMIN — SUCRALFATE 1 G: 1 TABLET ORAL at 12:01

## 2022-10-05 RX ADMIN — FLUOXETINE HYDROCHLORIDE 40 MG: 20 CAPSULE ORAL at 17:58

## 2022-10-05 RX ADMIN — BENZONATATE 100 MG: 100 CAPSULE ORAL at 22:21

## 2022-10-05 RX ADMIN — SUCRALFATE 1 G: 1 TABLET ORAL at 17:58

## 2022-10-05 RX ADMIN — GUAIFENESIN AND DEXTROMETHORPHAN 10 ML: 100; 10 SYRUP ORAL at 04:12

## 2022-10-05 RX ADMIN — HYDROCHLOROTHIAZIDE 25 MG: 25 TABLET ORAL at 08:24

## 2022-10-05 RX ADMIN — BENZONATATE 100 MG: 100 CAPSULE ORAL at 03:21

## 2022-10-05 RX ADMIN — LEVOTHYROXINE SODIUM 50 MCG: 0.05 TABLET ORAL at 05:45

## 2022-10-05 RX ADMIN — GUAIFENESIN AND DEXTROMETHORPHAN 10 ML: 100; 10 SYRUP ORAL at 19:22

## 2022-10-05 NOTE — PROGRESS NOTES
[address] [facility]  Progress Note - Blaine Ross 1940, 81 y o  female MRN: [id number]  Unit/Bed#: -19 Encounter: [id number]  Primary Care Provider: Dina Cao MD   Date and time admitted to hospital: 10/4/2022  5:31 AM    GERD (gastroesophageal reflux disease)  Assessment & Plan  Present admission with history of GERD  Resume home dose of PPI, Carafate  HLD (hyperlipidemia)  Assessment & Plan  Present on admission with history of hyperlipidemia  resume home dose of statin  CHUYITA (obstructive sleep apnea)  Assessment & Plan  Present on admission with history of sleep apnea  CPAP q h s  Hypothyroidism  Assessment & Plan  Present on admission with hypothyroidism  TSH within normal limits 1 60 in 04/2022  Levothyroxine 50 mcg daily  * Headache  Assessment & Plan  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 is, chronic opacification of bilateral frontal sinuses with partial dehiscence of the posterior wall, finding also concerning of posterior extension of left frontal opacity and recommended MRI with and without contrast   As per ED report case was discussed with ENT who recommended inpatient observation with inpatient MRI  On my encounter patient appears comfortable not in distress  Today, she reports that her headache has resolved  Denies neck stiffness, fever, chills, visual disturbances  Continue Tylenol, Motrin intermittently  Continue with IV hydration  Follow-up with brain MRI with and without contrast pain  Follow-up with inpatient ENT consult    Continue supportive care         VTE Pharmacologic Prophylaxis:   VTE Score: 4 Moderate Risk (Score 3-4) - Pharmacological DVT Prophylaxis Ordered: Enoxaparin (Lovenox)  Mechanical VTE Prophylaxis in Place: Yes    Patient Centered Rounds: I have performed bedside rounds with nursing staff today  Discussions with Specialists or Other Care Team Provider: ENT    Education and Discussions with Family / Patient: { Attempts:[id number]}    Current Length of Stay: 0 day(s)    Current Patient Status: Observation     Discharge Plan / Estimated Discharge Date: ***    Code Status: Level 1 - Full Code      Subjective:   Patient was seen and examined by me at bedside  Patient was pleasant and cooperative  Patient reports that her headache has resolved today  Denies fever, chills, visual changes, neck stiffness  Patient has no other acute complaints at this time  Objective:     Vitals:   Temp (24hrs), Av 1 °F (36 7 °C), Min:98 1 °F (36 7 °C), Max:98 1 °F (36 7 °C)    Temp:  [98 1 °F (36 7 °C)] 98 1 °F (36 7 °C)  HR:  [90-93] 90  Resp:  [15-21] 16  BP: (131-149)/(78-89) 149/89  SpO2:  [94 %-95 %] 95 %  Body mass index is 33 06 kg/m²  Input and Output Summary (last 24 hours): Intake/Output Summary (Last 24 hours) at 10/5/2022 1158  Last data filed at 10/5/2022 0326  Gross per 24 hour   Intake 240 ml   Output --   Net 240 ml       Physical Exam:     Physical Exam  Constitutional:       Appearance: Normal appearance  HENT:      Head: Normocephalic and atraumatic  Right Ear: External ear normal       Left Ear: External ear normal       Nose: Nose normal    Eyes:      General:         Right eye: No discharge  Left eye: No discharge  Extraocular Movements: Extraocular movements intact  Cardiovascular:      Rate and Rhythm: Normal rate and regular rhythm  Pulses: Normal pulses  Heart sounds: Normal heart sounds     Pulmonary:      Effort: Pulmonary effort is normal       Comments: Coarse breath sounds b/l   Abdominal:      General: Abdomen is flat  Bowel sounds are normal       Palpations: Abdomen is soft  Tenderness: There is no abdominal tenderness  Musculoskeletal:         General: Normal range of motion  Cervical back: Normal range of motion and neck supple  Skin:     General: Skin is warm and dry  Neurological:      General: No focal deficit present  Mental Status: She is alert and oriented to person, place, and time  Psychiatric:         Mood and Affect: Mood normal          Behavior: Behavior normal           Additional Data:     Labs:  Results from last 7 days   Lab Units 10/04/22  0751   WBC Thousand/uL 4 51   HEMOGLOBIN g/dL 14 3   HEMATOCRIT % 43 4   PLATELETS Thousands/uL 217   NEUTROS PCT % 83*   LYMPHS PCT % 12*   MONOS PCT % 5   EOS PCT % 0     Results from last 7 days   Lab Units 10/04/22  0751   SODIUM mmol/L 138   POTASSIUM mmol/L 4 1   CHLORIDE mmol/L 102   CO2 mmol/L 26   BUN mg/dL 10   CREATININE mg/dL 0 81   ANION GAP mmol/L 10   CALCIUM mg/dL 9 2   ALBUMIN g/dL 3 9   TOTAL BILIRUBIN mg/dL 0 44   ALK PHOS U/L 116   ALT U/L 31   AST U/L 24   GLUCOSE RANDOM mg/dL 126                       Imaging: No pertinent imaging reviewed      Recent Cultures (last 7 days):           Lines/Drains:  Invasive Devices  Report    Peripheral Intravenous Line  Duration           Peripheral IV 10/04/22 Left Antecubital 1 day                Telemetry:        Last 24 Hours Medication List:   Current Facility-Administered Medications   Medication Dose Route Frequency Provider Last Rate    acetaminophen  650 mg Oral Q6H PRN Jos Palmer MD      albuterol  2 puff Inhalation Q4H PRN Jos SPARKS MD      ARIPiprazole  5 mg Oral Daily Jos SPARKS MD      aspirin  81 mg Oral Daily Jos SPARKS MD      benzonatate  100 mg Oral TID PRN Sonya Cade MD      budesonide-formoterol  2 puff Inhalation BID Sonya Cade MD      dextromethorphan-guaiFENesin  10 mL Oral Q4H PRN Janel Menezes PA-C      enoxaparin  40 mg Subcutaneous Daily Shanika Vega MD      famotidine  20 mg Oral Daily Shanika Vega MD      FLUoxetine  40 mg Oral BID Liz Kline PA-C      hydrochlorothiazide  25 mg Oral Daily Shanika Vega MD      ibuprofen  400 mg Oral Q6H PRN Shanika Vega MD      levothyroxine  50 mcg Oral Early Morning Jos SPARKS MD      LORazepam  0 5 mg Oral BID PRN Shanika Vega MD      pravastatin  20 mg Oral Daily With Dinner Shanika Vega MD      sucralfate  1 g Oral 4x Daily Shanika Vega MD          Today, Patient Was Seen By: Tello Penny    ** Please Note: This note has been constructed using a voice recognition system   **

## 2022-10-05 NOTE — RESPIRATORY THERAPY NOTE
RT Protocol Note  Wanda Navarro 81 y o  female MRN: [id number]  Unit/Bed#: -02 Encounter: [id number]    Assessment    Principal Problem:    Headache  Active Problems:    Hypothyroidism    CHUYITA (obstructive sleep apnea)    HLD (hyperlipidemia)    GERD (gastroesophageal reflux disease)      Home Pulmonary Medications:  Symbicort, PRN albuterol MDI, PRN Accuneb       Past Medical History:   Diagnosis Date    Anxiety     COPD (chronic obstructive pulmonary disease) ([address] )     Depression     Disease of thyroid gland     Hyperlipidemia     Hypotension     Wrist fracture      Social History     Socioeconomic History    Marital status:       Spouse name: None    Number of children: 3    Years of education: None    Highest education level: None   Occupational History    Occupation: retired   Tobacco Use    Smoking status: Former Smoker     Packs/day: 0 25     Years: 30 00     Pack years: 7 50     Start date: 1970     Quit date: 2000     Years since quittin 7    Smokeless tobacco: Never Used   Vaping Use    Vaping Use: Never used   Substance and Sexual Activity    Alcohol use: No    Drug use: No    Sexual activity: Not Currently     Partners: Male     Birth control/protection: None   Other Topics Concern    None   Social History Narrative    None     Social Determinants of Health     Financial Resource Strain: Not on file   Food Insecurity: Not on file   Transportation Needs: Not on file   Physical Activity: Not on file   Stress: Not on file   Social Connections: Not on file   Intimate Partner Violence: Not on file   Housing Stability: Not on file       Subjective         Objective    Physical Exam:   Assessment Type: Assess only  General Appearance: Alert, Awake  Respiratory Pattern: Normal  Chest Assessment: Chest expansion symmetrical  Bilateral Breath Sounds: Coarse, Expiratory wheezes  Cough: Strong, Non-productive, Barky  O2 Device: Room air    Vitals:  Blood pressure 149/89, pulse 90, temperature 98 1 °F (36 7 °C), temperature source Oral, resp  rate 16, height 5' 2" (1 575 m), weight 82 kg (180 lb 12 4 oz), SpO2 95 %  Imaging and other studies: I have personally reviewed pertinent reports  O2 Device: Room air     Plan    Respiratory Plan: Home Bronchodilator Patient pathway      Patient witnessed walking in room and talking on phone  No distress noted  Patient ordered on Home MDIs

## 2022-10-05 NOTE — PROGRESS NOTES
[facility]  Progress Note - Khadijah Sanchez 1940, 81 y o  female MRN: [id number]  Unit/Bed#: -18 Encounter: [id number]  Primary Care Provider: Cintia Acosta MD   Date and time admitted to hospital: 10/4/2022  5:31 AM    GERD (gastroesophageal reflux disease)  Assessment & Plan  Present admission with history of GERD  Resume home dose of PPI, Carafate  HLD (hyperlipidemia)  Assessment & Plan  Present on admission with history of hyperlipidemia  resume home dose of statin  CHUYITA (obstructive sleep apnea)  Assessment & Plan  Present on admission with history of sleep apnea  CPAP q h s  Hypothyroidism  Assessment & Plan  Present on admission with hypothyroidism  TSH within normal limits 1 60 in 04/2022  Levothyroxine 50 mcg daily  * Headache  Assessment & Plan  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 is, chronic opacification of bilateral frontal sinuses with partial dehiscence of the posterior wall, finding also concerning of posterior extension of left frontal opacity and recommended MRI with and without contrast   As per ED report case was discussed with ENT who recommended inpatient observation with inpatient MRI  On my encounter patient appears comfortable not in distress  Currently she does report having persistent headache however does report that headache improved intensity while she is in the emergency room  Denies neck stiffness, fever, chills, visual disturbances  Continue Tylenol, Motrin intermittently  Continue with IV hydration    ESR is normal limits  Follow-up with brain MRI with and without contrast pain  ENT recommendations appreciated  Continue supportive care                VTE Pharmacologic Prophylaxis:   VTE Score: 4 Moderate Risk (Score 3-4) - Pharmacological DVT Prophylaxis Ordered: Enoxaparin (Lovenox)      Mechanical VTE Prophylaxis in Place: Yes     Patient Centered Rounds: I have performed bedside rounds with nursing staff today      Discussions with Specialists or Other Care Team Provider: ENT     Education and Discussions with Family / Patient: Patient declined call to       Current Length of Stay: 0 day(s)     Current Patient Status: Observation      Discharge Plan / Estimated Discharge Date:       Code Status: Level 1 - Full Code        Subjective:   Patient was seen and examined by me at bedside  Patient was pleasant and cooperative  Patient reports that her headache has resolved today  Denies fever, chills, visual changes, neck stiffness  Patient has no other acute complaints at this time        Objective:      Vitals:   Temp (24hrs), Av 1 °F (36 7 °C), Min:98 1 °F (36 7 °C), Max:98 1 °F (36 7 °C)     Temp:  [98 1 °F (36 7 °C)] 98 1 °F (36 7 °C)  HR:  [90-93] 90  Resp:  [15-21] 16  BP: (131-149)/(78-89) 149/89  SpO2:  [94 %-95 %] 95 %  Body mass index is 33 06 kg/m²       Input and Output Summary (last 24 hours):         Intake/Output Summary (Last 24 hours) at 10/5/2022 1158  Last data filed at 10/5/2022 0326      Gross per 24 hour   Intake 240 ml   Output --   Net 240 ml         Physical Exam:      Physical Exam  Constitutional:       Appearance: Normal appearance  HENT:      Head: Normocephalic and atraumatic  Right Ear: External ear normal       Left Ear: External ear normal       Nose: Nose normal    Eyes:      General:         Right eye: No discharge  Left eye: No discharge  Extraocular Movements: Extraocular movements intact  Cardiovascular:      Rate and Rhythm: Normal rate and regular rhythm        Pulses: Normal pulses  Heart sounds: Normal heart sounds  Pulmonary:      Effort: Pulmonary effort is normal       Comments: Coarse breath sounds b/l   Abdominal:      General: Abdomen is flat  Bowel sounds are normal       Palpations: Abdomen is soft  Tenderness: There is no abdominal tenderness  Musculoskeletal:         General: Normal range of motion  Cervical back: Normal range of motion and neck supple  Skin:     General: Skin is warm and dry  Neurological:      General: No focal deficit present  Mental Status: She is alert and oriented to person, place, and time     Psychiatric:         Mood and Affect: Mood normal          Behavior: Behavior normal             Additional Data:      Labs:       Results from last 7 days   Lab Units 10/04/22  0751   WBC Thousand/uL 4 51   HEMOGLOBIN g/dL 14 3   HEMATOCRIT % 43 4   PLATELETS Thousands/uL 217   NEUTROS PCT % 83*   LYMPHS PCT % 12*   MONOS PCT % 5   EOS PCT % 0           Results from last 7 days   Lab Units 10/04/22  0751   SODIUM mmol/L 138   POTASSIUM mmol/L 4 1   CHLORIDE mmol/L 102   CO2 mmol/L 26   BUN mg/dL 10   CREATININE mg/dL 0 81   ANION GAP mmol/L 10   CALCIUM mg/dL 9 2   ALBUMIN g/dL 3 9   TOTAL BILIRUBIN mg/dL 0 44   ALK PHOS U/L 116   ALT U/L 31   AST U/L 24   GLUCOSE RANDOM mg/dL 126                         Imaging: No pertinent imaging reviewed      Recent Cultures (last 7 days):             Lines/Drains:       Invasive Devices  Report             Peripheral Intravenous Line  Duration                     Peripheral IV 10/04/22 Left Antecubital 1 day                     Telemetry:         Last 24 Hours Medication List:            Current Facility-Administered Medications   Medication Dose Route Frequency Provider Last Rate    acetaminophen  650 mg Oral Q6H PRN Jos SPARKS MD      albuterol  2 puff Inhalation Q4H PRN Jos SPARKS MD      ARIPiprazole  5 mg Oral Daily Jos SPARKS MD      aspirin  81 mg Oral Daily Bela Sargent MD      benzonatate  100 mg Oral TID PRN Bela Sargent MD      budesonide-formoterol  2 puff Inhalation BID Bela Sargent MD      dextromethorphan-guaiFENesin  10 mL Oral Q4H PRN Ludy Menezes PA-C      enoxaparin  40 mg Subcutaneous Daily Bela Sargent MD      famotidine  20 mg Oral Daily Renetta SPARKS MD      FLUoxetine  40 mg Oral BID Rick Petit PA-C      hydrochlorothiazide  25 mg Oral Daily Bela Sargent MD      ibuprofen  400 mg Oral Q6H PRN Bela Sargent MD      levothyroxine  50 mcg Oral Early Morning Jos SPARKS MD      LORazepam  0 5 mg Oral BID PRN Bela Sargent MD      pravastatin  20 mg Oral Daily With Dinner Bela Sargent MD      sucralfate  1 g Oral 4x Daily Bela Sargent MD           Today, Patient Was Seen By: Nathaniel Terry     ** Please Note: This note has been constructed using a voice recognition system   **                Revision History

## 2022-10-05 NOTE — PLAN OF CARE
Problem: Potential for Falls  Goal: Patient will remain free of falls  Description: INTERVENTIONS:  - Educate patient/family on patient safety including physical limitations  - Instruct patient to call for assistance with activity   - Consult OT/PT to assist with strengthening/mobility   - Keep Call bell within reach  - Keep bed low and locked with side rails adjusted as appropriate  - Keep care items and personal belongings within reach  - Initiate and maintain comfort rounds  - Make Fall Risk Sign visible to staff  - Initiate/Maintain bed alarm  - Apply yellow socks and bracelet for high fall risk patients  - Consider moving patient to room near nurses station  Outcome: Progressing     Problem: PAIN - ADULT  Goal: Verbalizes/displays adequate comfort level or baseline comfort level  Description: Interventions:  - Encourage patient to monitor pain and request assistance  - Assess pain using appropriate pain scale  - Administer analgesics based on type and severity of pain and evaluate response  - Implement non-pharmacological measures as appropriate and evaluate response  - Consider cultural and social influences on pain and pain management  - Notify physician/advanced practitioner if interventions unsuccessful or patient reports new pain  Outcome: Progressing     Problem: SAFETY ADULT  Goal: Maintain or return to baseline ADL function  Description: INTERVENTIONS:  -  Assess patient's ability to carry out ADLs; assess patient's baseline for ADL function and identify physical deficits which impact ability to perform ADLs (bathing, care of mouth/teeth, toileting, grooming, dressing, etc )  - Assess/evaluate cause of self-care deficits   - Assess range of motion  - Assess patient's mobility; develop plan if impaired  - Assess patient's need for assistive devices and provide as appropriate  - Encourage maximum independence but intervene and supervise when necessary  - Involve family in performance of ADLs  - Assess for home care needs following discharge   - Consider OT consult to assist with ADL evaluation and planning for discharge  - Provide patient education as appropriate  Outcome: Progressing     Problem: DISCHARGE PLANNING  Goal: Discharge to home or other facility with appropriate resources  Description: INTERVENTIONS:  - Identify barriers to discharge w/patient and caregiver  - Arrange for needed discharge resources and transportation as appropriate  - Identify discharge learning needs (meds, wound care, etc )  - Arrange for interpretive services to assist at discharge as needed  - Refer to Case Management Department for coordinating discharge planning if the patient needs post-hospital services based on physician/advanced practitioner order or complex needs related to functional status, cognitive ability, or social support system  Outcome: Progressing     Problem: Knowledge Deficit  Goal: Patient/family/caregiver demonstrates understanding of disease process, treatment plan, medications, and discharge instructions  Description: Complete learning assessment and assess knowledge base    Interventions:  - Provide teaching at level of understanding  - Provide teaching via preferred learning methods  Outcome: Progressing     Problem: Potential for Falls  Goal: Patient will remain free of falls  Description: INTERVENTIONS:  - Educate patient/family on patient safety including physical limitations  - Instruct patient to call for assistance with activity   - Consult OT/PT to assist with strengthening/mobility   - Keep Call bell within reach  - Keep bed low and locked with side rails adjusted as appropriate  - Keep care items and personal belongings within reach  - Initiate and maintain comfort rounds  - Make Fall Risk Sign visible to staff  - Initiate/Maintain bed alarm  - Apply yellow socks and bracelet for high fall risk patients  - Consider moving patient to room near nurses station  Outcome: Progressing     Problem: PAIN - ADULT  Goal: Verbalizes/displays adequate comfort level or baseline comfort level  Description: Interventions:  - Encourage patient to monitor pain and request assistance  - Assess pain using appropriate pain scale  - Administer analgesics based on type and severity of pain and evaluate response  - Implement non-pharmacological measures as appropriate and evaluate response  - Consider cultural and social influences on pain and pain management  - Notify physician/advanced practitioner if interventions unsuccessful or patient reports new pain  Outcome: Progressing     Problem: SAFETY ADULT  Goal: Maintain or return to baseline ADL function  Description: INTERVENTIONS:  -  Assess patient's ability to carry out ADLs; assess patient's baseline for ADL function and identify physical deficits which impact ability to perform ADLs (bathing, care of mouth/teeth, toileting, grooming, dressing, etc )  - Assess/evaluate cause of self-care deficits   - Assess range of motion  - Assess patient's mobility; develop plan if impaired  - Assess patient's need for assistive devices and provide as appropriate  - Encourage maximum independence but intervene and supervise when necessary  - Involve family in performance of ADLs  - Assess for home care needs following discharge   - Consider OT consult to assist with ADL evaluation and planning for discharge  - Provide patient education as appropriate  Outcome: Progressing     Problem: DISCHARGE PLANNING  Goal: Discharge to home or other facility with appropriate resources  Description: INTERVENTIONS:  - Identify barriers to discharge w/patient and caregiver  - Arrange for needed discharge resources and transportation as appropriate  - Identify discharge learning needs (meds, wound care, etc )  - Arrange for interpretive services to assist at discharge as needed  - Refer to Case Management Department for coordinating discharge planning if the patient needs post-hospital services based on physician/advanced practitioner order or complex needs related to functional status, cognitive ability, or social support system  Outcome: Progressing     Problem: Knowledge Deficit  Goal: Patient/family/caregiver demonstrates understanding of disease process, treatment plan, medications, and discharge instructions  Description: Complete learning assessment and assess knowledge base    Interventions:  - Provide teaching at level of understanding  - Provide teaching via preferred learning methods  Outcome: Progressing

## 2022-10-05 NOTE — PLAN OF CARE
Problem: Potential for Falls  Goal: Patient will remain free of falls  Description: INTERVENTIONS:  - Educate patient/family on patient safety including physical limitations  - Instruct patient to call for assistance with activity   - Consult OT/PT to assist with strengthening/mobility   - Keep Call bell within reach  - Keep bed low and locked with side rails adjusted as appropriate  - Keep care items and personal belongings within reach  - Initiate and maintain comfort rounds  - Make Fall Risk Sign visible to staff  - Offer Toileting every Hours, in advance of need  - Initiate/Maintain alarm  - Obtain necessary fall risk management equipment:   - Apply yellow socks and bracelet for high fall risk patients  - Consider moving patient to room near nurses station  Outcome: Progressing     Problem: PAIN - ADULT  Goal: Verbalizes/displays adequate comfort level or baseline comfort level  Description: Interventions:  - Encourage patient to monitor pain and request assistance  - Assess pain using appropriate pain scale  - Administer analgesics based on type and severity of pain and evaluate response  - Implement non-pharmacological measures as appropriate and evaluate response  - Consider cultural and social influences on pain and pain management  - Notify physician/advanced practitioner if interventions unsuccessful or patient reports new pain  Outcome: Progressing     Problem: SAFETY ADULT  Goal: Maintain or return to baseline ADL function  Description: INTERVENTIONS:  -  Assess patient's ability to carry out ADLs; assess patient's baseline for ADL function and identify physical deficits which impact ability to perform ADLs (bathing, care of mouth/teeth, toileting, grooming, dressing, etc )  - Assess/evaluate cause of self-care deficits   - Assess range of motion  - Assess patient's mobility; develop plan if impaired  - Assess patient's need for assistive devices and provide as appropriate  - Encourage maximum independence but intervene and supervise when necessary  - Involve family in performance of ADLs  - Assess for home care needs following discharge   - Consider OT consult to assist with ADL evaluation and planning for discharge  - Provide patient education as appropriate  Outcome: Progressing     Problem: DISCHARGE PLANNING  Goal: Discharge to home or other facility with appropriate resources  Description: INTERVENTIONS:  - Identify barriers to discharge w/patient and caregiver  - Arrange for needed discharge resources and transportation as appropriate  - Identify discharge learning needs (meds, wound care, etc )  - Arrange for interpretive services to assist at discharge as needed  - Refer to Case Management Department for coordinating discharge planning if the patient needs post-hospital services based on physician/advanced practitioner order or complex needs related to functional status, cognitive ability, or social support system  Outcome: Progressing     Problem: Knowledge Deficit  Goal: Patient/family/caregiver demonstrates understanding of disease process, treatment plan, medications, and discharge instructions  Description: Complete learning assessment and assess knowledge base    Interventions:  - Provide teaching at level of understanding  - Provide teaching via preferred learning methods  Outcome: Progressing

## 2022-10-05 NOTE — ASSESSMENT & PLAN NOTE
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 is, chronic opacification of bilateral frontal sinuses with partial dehiscence of the posterior wall, finding also concerning of posterior extension of left frontal opacity and recommended MRI with and without contrast   As per ED report case was discussed with ENT who recommended inpatient observation with inpatient MRI  On my encounter patient appears comfortable not in distress  Currently she does report having persistent headache however does report that headache improved intensity while she is in the emergency room  Denies neck stiffness, fever, chills, visual disturbances  Continue Tylenol, Motrin intermittently  Continue with IV hydration  ESR is normal limits  Follow-up with brain MRI with and without contrast pain  ENT recommendations appreciated  Continue supportive care

## 2022-10-05 NOTE — ASSESSMENT & PLAN NOTE
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 is, chronic opacification of bilateral frontal sinuses with partial dehiscence of the posterior wall, finding also concerning of posterior extension of left frontal opacity and recommended MRI with and without contrast   As per ED report case was discussed with ENT who recommended inpatient observation with inpatient MRI  On my encounter patient appears comfortable not in distress  Today, she reports that her headache has resolved  Denies neck stiffness, fever, chills, visual disturbances  Continue Tylenol, Motrin intermittently  Continue with IV hydration  Follow-up with brain MRI with and without contrast pain  Follow-up with inpatient ENT consult  Continue supportive care

## 2022-10-06 PROCEDURE — 99232 SBSQ HOSP IP/OBS MODERATE 35: CPT | Performed by: STUDENT IN AN ORGANIZED HEALTH CARE EDUCATION/TRAINING PROGRAM

## 2022-10-06 PROCEDURE — 94664 DEMO&/EVAL PT USE INHALER: CPT

## 2022-10-06 PROCEDURE — 31231 NASAL ENDOSCOPY DX: CPT | Performed by: STUDENT IN AN ORGANIZED HEALTH CARE EDUCATION/TRAINING PROGRAM

## 2022-10-06 PROCEDURE — 99221 1ST HOSP IP/OBS SF/LOW 40: CPT | Performed by: STUDENT IN AN ORGANIZED HEALTH CARE EDUCATION/TRAINING PROGRAM

## 2022-10-06 RX ADMIN — ARIPIPRAZOLE 5 MG: 5 TABLET ORAL at 10:46

## 2022-10-06 RX ADMIN — FAMOTIDINE 20 MG: 20 TABLET ORAL at 10:46

## 2022-10-06 RX ADMIN — BENZONATATE 100 MG: 100 CAPSULE ORAL at 20:36

## 2022-10-06 RX ADMIN — SUCRALFATE 1 G: 1 TABLET ORAL at 10:46

## 2022-10-06 RX ADMIN — SUCRALFATE 1 G: 1 TABLET ORAL at 21:06

## 2022-10-06 RX ADMIN — GUAIFENESIN AND DEXTROMETHORPHAN 10 ML: 100; 10 SYRUP ORAL at 21:08

## 2022-10-06 RX ADMIN — BUDESONIDE AND FORMOTEROL FUMARATE DIHYDRATE 2 PUFF: 160; 4.5 AEROSOL RESPIRATORY (INHALATION) at 10:46

## 2022-10-06 RX ADMIN — GUAIFENESIN AND DEXTROMETHORPHAN 10 ML: 100; 10 SYRUP ORAL at 01:09

## 2022-10-06 RX ADMIN — PRAVASTATIN SODIUM 20 MG: 20 TABLET ORAL at 17:58

## 2022-10-06 RX ADMIN — HYDROCHLOROTHIAZIDE 25 MG: 25 TABLET ORAL at 10:46

## 2022-10-06 RX ADMIN — GUAIFENESIN AND DEXTROMETHORPHAN 10 ML: 100; 10 SYRUP ORAL at 06:35

## 2022-10-06 RX ADMIN — BUDESONIDE AND FORMOTEROL FUMARATE DIHYDRATE 2 PUFF: 160; 4.5 AEROSOL RESPIRATORY (INHALATION) at 17:58

## 2022-10-06 RX ADMIN — ASPIRIN 81 MG: 81 TABLET, CHEWABLE ORAL at 10:46

## 2022-10-06 RX ADMIN — SUCRALFATE 1 G: 1 TABLET ORAL at 17:58

## 2022-10-06 RX ADMIN — FLUOXETINE HYDROCHLORIDE 40 MG: 20 CAPSULE ORAL at 17:58

## 2022-10-06 RX ADMIN — ENOXAPARIN SODIUM 40 MG: 40 INJECTION SUBCUTANEOUS at 10:46

## 2022-10-06 RX ADMIN — LEVOTHYROXINE SODIUM 50 MCG: 0.05 TABLET ORAL at 05:36

## 2022-10-06 RX ADMIN — FLUOXETINE HYDROCHLORIDE 40 MG: 20 CAPSULE ORAL at 10:46

## 2022-10-06 NOTE — PLAN OF CARE
Problem: Potential for Falls  Goal: Patient will remain free of falls  Description: INTERVENTIONS:  - Educate patient/family on patient safety including physical limitations  - Instruct patient to call for assistance with activity   - Consult OT/PT to assist with strengthening/mobility   - Keep Call bell within reach  - Keep bed low and locked with side rails adjusted as appropriate  - Keep care items and personal belongings within reach  - Initiate and maintain comfort rounds  - Make Fall Risk Sign visible to staff  - Offer Toileting every 2 Hours, in advance of need  - Initiate/Maintain bed alarm  - Obtain necessary fall risk management equipment  - Apply yellow socks and bracelet for high fall risk patients  - Consider moving patient to room near nurses station  Outcome: Progressing     Problem: PAIN - ADULT  Goal: Verbalizes/displays adequate comfort level or baseline comfort level  Description: Interventions:  - Encourage patient to monitor pain and request assistance  - Assess pain using appropriate pain scale  - Administer analgesics based on type and severity of pain and evaluate response  - Implement non-pharmacological measures as appropriate and evaluate response  - Consider cultural and social influences on pain and pain management  - Notify physician/advanced practitioner if interventions unsuccessful or patient reports new pain  Outcome: Progressing     Problem: SAFETY ADULT  Goal: Maintain or return to baseline ADL function  Description: INTERVENTIONS:  -  Assess patient's ability to carry out ADLs; assess patient's baseline for ADL function and identify physical deficits which impact ability to perform ADLs (bathing, care of mouth/teeth, toileting, grooming, dressing, etc )  - Assess/evaluate cause of self-care deficits   - Assess range of motion  - Assess patient's mobility; develop plan if impaired  - Assess patient's need for assistive devices and provide as appropriate  - Encourage maximum independence but intervene and supervise when necessary  - Involve family in performance of ADLs  - Assess for home care needs following discharge   - Consider OT consult to assist with ADL evaluation and planning for discharge  - Provide patient education as appropriate  Outcome: Progressing     Problem: DISCHARGE PLANNING  Goal: Discharge to home or other facility with appropriate resources  Description: INTERVENTIONS:  - Identify barriers to discharge w/patient and caregiver  - Arrange for needed discharge resources and transportation as appropriate  - Identify discharge learning needs (meds, wound care, etc )  - Arrange for interpretive services to assist at discharge as needed  - Refer to Case Management Department for coordinating discharge planning if the patient needs post-hospital services based on physician/advanced practitioner order or complex needs related to functional status, cognitive ability, or social support system  Outcome: Progressing     Problem: Knowledge Deficit  Goal: Patient/family/caregiver demonstrates understanding of disease process, treatment plan, medications, and discharge instructions  Description: Complete learning assessment and assess knowledge base    Interventions:  - Provide teaching at level of understanding  - Provide teaching via preferred learning methods  Outcome: Progressing

## 2022-10-06 NOTE — PROGRESS NOTES
[address] [facility]  Progress Note - Marti Skiff 1940, 81 y o  female MRN: [id number]  Unit/Bed#: -95 Encounter: [id number]  Primary Care Provider: Fidencio Diego MD   Date and time admitted to hospital: 10/4/2022  5:31 AM    GERD (gastroesophageal reflux disease)  Assessment & Plan  Present admission with history of GERD  Resume home dose of PPI, Carafate  HLD (hyperlipidemia)  Assessment & Plan  Present on admission with history of hyperlipidemia  resume home dose of statin  CHUYITA (obstructive sleep apnea)  Assessment & Plan  Present on admission with history of sleep apnea  CPAP q h s  Hypothyroidism  Assessment & Plan  Present on admission with hypothyroidism  TSH within normal limits 1 60 in 04/2022  Levothyroxine 50 mcg daily  * Headache  Assessment & Plan  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 is, chronic opacification of bilateral frontal sinuses with partial dehiscence of the posterior wall, finding also concerning of posterior extension of left frontal opacity and recommended MRI with and without contrast   As per ED report case was discussed with ENT who recommended inpatient observation with inpatient MRI  On my encounter patient appears comfortable not in distress  Currently she does report having persistent headache however does report that headache improved intensity while she is in the emergency room  Denies neck stiffness, fever, chills, visual disturbances  Continue Tylenol, Motrin intermittently  Continue with IV hydration    ESR is normal limits  MRI of brain showed Bilobed 4 cm expansile 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  The mass has transgressed/smoothly eroded the inner table of the left frontal bone, extending into the adjacent extra-axial space, compressing the left gyrus rectus  Imaging features favor an enlarging mucocele  Other etiologies including fungal disease or perhaps less likely other mass lesions not excluded  Neurosurgical assessment recommended  TT neurosurgery for evaluation and recommendations  ENT recommendations appreciated  Continue supportive care  VTE Pharmacologic Prophylaxis: VTE Score: 4 High Risk (Score >/= 5) - Pharmacological DVT Prophylaxis Ordered: enoxaparin (Lovenox)  Sequential Compression Devices Ordered  Patient Centered Rounds: I performed bedside rounds with nursing staff today  Discussions with Specialists or Other Care Team Provider: Neurosurgery, ENT    Education and Discussions with Family / Patient: Updated  (daughter in law) via phone  Time Spent for Care: 45 minutes  More than 50% of total time spent on counseling and coordination of care as described above  Current Length of Stay: 1 day(s)  Current Patient Status: Inpatient   Certification Statement: The patient will continue to require additional inpatient hospital stay due to Hydrocele frontal sinus, needs neurosurgery recommendations  Discharge Plan: Anticipate discharge in 24-48 hrs to home  Code Status: Level 1 - Full Code    Subjective:   Patient has improved headache  She anxious about MRI results  NO chest pain, no sob, no cough and no fevers  Objective:     Vitals:   Temp (24hrs), Av 1 °F (36 7 °C), Min:98 °F (36 7 °C), Max:98 1 °F (36 7 °C)    Temp:  [98 °F (36 7 °C)-98 1 °F (36 7 °C)] 98 1 °F (36 7 °C)  HR:  [79-81] 81  Resp:  [18-20] 18  BP: (122-153)/(75-90) 142/85  SpO2:  [92 %-95 %] 92 %  Body mass index is 33 06 kg/m²  Input and Output Summary (last 24 hours):      Intake/Output Summary (Last 24 hours) at 10/6/2022 1245  Last data filed at 10/6/2022 0756  Gross per 24 hour   Intake 360 ml   Output --   Net 360 ml       Physical Exam:   Physical Exam  Vitals and nursing note reviewed  Constitutional:       General: She is not in acute distress  Appearance: She is well-developed  HENT:      Head: Normocephalic and atraumatic  Eyes:      General:         Right eye: No discharge  Left eye: No discharge  Conjunctiva/sclera: Conjunctivae normal    Cardiovascular:      Rate and Rhythm: Normal rate and regular rhythm  Heart sounds: No murmur heard  Pulmonary:      Effort: Pulmonary effort is normal  No respiratory distress  Breath sounds: Rhonchi present  Abdominal:      General: There is no distension  Palpations: Abdomen is soft  There is no mass  Tenderness: There is no abdominal tenderness  Musculoskeletal:         General: No swelling, tenderness, deformity or signs of injury  Cervical back: Neck supple  No rigidity or tenderness  Skin:     General: Skin is warm and dry  Coloration: Skin is not jaundiced or pale  Findings: No bruising or erythema  Neurological:      General: No focal deficit present  Mental Status: She is alert and oriented to person, place, and time  Cranial Nerves: No cranial nerve deficit  Sensory: No sensory deficit  Motor: No weakness  Coordination: Coordination normal    Psychiatric:         Mood and Affect: Mood normal          Behavior: Behavior normal          Thought Content:  Thought content normal            Additional Data:     Labs:  Results from last 7 days   Lab Units 10/04/22  0751   WBC Thousand/uL 4 51   HEMOGLOBIN g/dL 14 3   HEMATOCRIT % 43 4   PLATELETS Thousands/uL 217   NEUTROS PCT % 83*   LYMPHS PCT % 12*   MONOS PCT % 5   EOS PCT % 0     Results from last 7 days   Lab Units 10/04/22  0751   SODIUM mmol/L 138   POTASSIUM mmol/L 4 1   CHLORIDE mmol/L 102   CO2 mmol/L 26   BUN mg/dL 10 CREATININE mg/dL 0 81   ANION GAP mmol/L 10   CALCIUM mg/dL 9 2   ALBUMIN g/dL 3 9   TOTAL BILIRUBIN mg/dL 0 44   ALK PHOS U/L 116   ALT U/L 31   AST U/L 24   GLUCOSE RANDOM mg/dL 126                       Lines/Drains:  Invasive Devices  Report    Peripheral Intravenous Line  Duration           Peripheral IV 10/04/22 Left Antecubital 2 days                      Imaging: Reviewed radiology reports from this admission including: MRI brain    Recent Cultures (last 7 days):         Last 24 Hours Medication List:   Current Facility-Administered Medications   Medication Dose Route Frequency Provider Last Rate    acetaminophen  650 mg Oral Q6H PRN Jos SPARKS MD      albuterol  2 puff Inhalation Q4H PRN Jos SPARKS MD      ARIPiprazole  5 mg Oral Daily Jos SPARKS MD      aspirin  81 mg Oral Daily Jos Yin V, MD      benzonatate  100 mg Oral TID PRN Chung Shelby MD      budesonide-formoterol  2 puff Inhalation BID Chung Shelby MD      dextromethorphan-guaiFENesin  10 mL Oral Q4H PRN Aj Menezes PA-C      enoxaparin  40 mg Subcutaneous Daily Jos SPARKS MD      famotidine  20 mg Oral Daily Jos SPARKS MD      FLUoxetine  40 mg Oral BID Mundo Menezes PA-C      hydrochlorothiazide  25 mg Oral Daily Jos SPARKS MD      ibuprofen  400 mg Oral Q6H PRN Jos Blanco MD      levothyroxine  50 mcg Oral Early Morning Jos SPARKS MD      LORazepam  0 5 mg Oral BID PRN Chung Shelby MD      pravastatin  20 mg Oral Daily With Dinner Chung Shelby MD      sucralfate  1 g Oral 4x Daily Chung Shelby MD          Today, Patient Was Seen By: Stanford Adams    **Please Note: This note may have been constructed using a voice recognition system  **

## 2022-10-06 NOTE — CASE MANAGEMENT
Case Management Assessment & Discharge Planning Note    Patient name Josh Gilbert  Location [address] [id number] MRN [id number]  : 1940 Date 10/6/2022       Current Admission Date: 10/4/2022  Current Admission Diagnosis:Headache   Patient Active Problem List    Diagnosis Date Noted    HLD (hyperlipidemia) 10/04/2022    GERD (gastroesophageal reflux disease) 10/04/2022    Headache 10/04/2022    Obesity, morbid ([facility] [address] ) 10/25/2021    CHUYITA (obstructive sleep apnea)     Asthma with COPD ([facility] [address] )     Hypoxia 2019    Hypothyroidism 2019    Hypokalemia 2019    COPD exacerbation ([facility] [address] )     Left wrist injury, initial encounter 2019      LOS (days): 1  Geometric Mean LOS (GMLOS) (days): 2 30  Days to GMLOS:1 4     OBJECTIVE:    Risk of Unplanned Readmission Score: 14 14      Current admission status: Inpatient       Preferred Pharmacy:   [id number] [place] [id number] [place] [address]  Phone: [phone] Fax: [phone]    [facility]  ([address],   [address] [place] [address]  [address] [place] [id number]  Phone: [phone] Fax: [phone]    Primary Care Provider: Lina Sotomayor MD    Primary Insurance: MEDICARE  Secondary Insurance: [facility]    ASSESSMENT:  [address] [facility] [address] [facility] Representative - Son   Primary Phone: [phone] (Work)  Home Phone: [phone]               Advance Directives  Does patient have a [address]?: Yes  Does patient have Advance Directives?: Yes  Advance Directives: Living will    Readmission Root Cause  30 Day Readmission: No    Patient Information  Admitted from[de-identified] Home  Mental Status: Alert  During Assessment patient was accompanied by: Not accompanied during assessment  Assessment information provided by[de-identified] Patient  Primary Caregiver: Self  Support Systems: Self, Family members, Son, Daughter  [place] of Residence: [place] do you live in?: [place]  Type of Current Residence: [place]  Living Arrangements: Lives Alone  Is patient a ?: No    Activities of Daily Living Prior to Admission  Functional Status: Independent  Completes ADLs independently?: Yes  Ambulates independently?: Yes  Does patient use assisted devices?: No  Does patient currently own DME?: No  Does patient have a history of Outpatient Therapy (PT/OT)?: No  Does the patient have a history of Short-Term Rehab?: No  Does patient have a history of HHC?: No  Does patient currently have [facility]?: No    Patient Information Continued  Does patient have prescription coverage?: Yes  Does patient receive dialysis treatments?: No  Does patient have a history of substance abuse?: No  Does patient have a history of Mental Health Diagnosis?: No    Means of Transportation  Means of Transport to [facility][de-identified] Family transport    DISCHARGE DETAILS:    Discharge planning discussed with[de-identified] The patient  Freedom of Choice: Yes  Comments - Freedom of Choice: CM met with the patient at the bedside to complete the initial assessment  The patient was admitted to the hospital for headache  The patient is oriented x4 able to make needs known to staff  The patients demographic sheet was verified  The patient lives in a 1 story house alone and able to complete her ADLs and IADLs  The patient has received her COVID Vaccines x 2 doses and 2 boosters Moderna  The patients discharge disposition will be home with self care  The patients friend will provide transportation home at discharge  CM will continue to follow    CM contacted family/caregiver?: No- see comments (The patient is independent)  Were Treatment Team discharge recommendations reviewed with patient/caregiver?: No (No discharge recommendations noted)  Did patient/caregiver verbalize understanding of patient care needs?: Yes       Contacts  Patient Contacts: [place]-daughter-in-law  Relationship to Patient[de-identified] Family  Contact Method: Phone  Phone Number: [phone]  Reason/Outcome: Continuity of Care, Emergency [address]         Is the patient interested in [place] [address] at discharge?: No    DME Referral Provided  Referral made for DME?: No    Treatment Team Recommendation: Home  Discharge Destination Plan[de-identified] Home

## 2022-10-06 NOTE — RESPIRATORY THERAPY NOTE
RT Protocol Note  Zuly Dk 81 y o  female MRN: [id number]  Unit/Bed#: -18 Encounter: [id number]    Assessment    Principal Problem:    Headache  Active Problems:    Hypothyroidism    CHUYITA (obstructive sleep apnea)    HLD (hyperlipidemia)    GERD (gastroesophageal reflux disease)      Home Pulmonary Medications:     10/06/22 0103   Respiratory Protocol   Protocol Initiated? Yes   Protocol Selection Respiratory   Language Barrier? No   Medical & Social History Reviewed? Yes   Diagnostic Studies Reviewed? Yes   Physical Assessment Performed? Yes   Respiratory Plan Home Bronchodilator Patient pathway   Respiratory Assessment   Assessment Type Assess only   General Appearance Sleeping   Respiratory Pattern Normal   Chest Assessment Chest expansion symmetrical   Bilateral Breath Sounds Diminished   R Breath Sounds Diminished   L Breath Sounds Diminished   Cough Non-productive   O2 Device RA   Additional Assessments   Pulse 80   Respirations 20   SpO2 95 %          Past Medical History:   Diagnosis Date    Anxiety     COPD (chronic obstructive pulmonary disease) (HCC)     Depression     Disease of thyroid gland     Hyperlipidemia     Hypotension     Wrist fracture      Social History     Socioeconomic History    Marital status:       Spouse name: None    Number of children: 3    Years of education: None    Highest education level: None   Occupational History    Occupation: retired   Tobacco Use    Smoking status: Former Smoker     Packs/day: 0 25     Years: 30 00     Pack years: 7 50     Start date: 1970     Quit date: 2000     Years since quittin 7    Smokeless tobacco: Never Used   Vaping Use    Vaping Use: Never used   Substance and Sexual Activity    Alcohol use: No    Drug use: No    Sexual activity: Not Currently     Partners: Male     Birth control/protection: None   Other Topics Concern    None   Social History Narrative    None     Social Determinants of Health     Financial Resource Strain: Not on file   Food Insecurity: Not on file   Transportation Needs: Not on file   Physical Activity: Not on file   Stress: Not on file   Social Connections: Not on file   Intimate Partner Violence: Not on file   Housing Stability: Not on file       Subjective         Objective    Physical Exam:   Assessment Type: Assess only  General Appearance: Sleeping  Respiratory Pattern: Normal  Chest Assessment: Chest expansion symmetrical  Bilateral Breath Sounds: Diminished  R Breath Sounds: Diminished  L Breath Sounds: Diminished  Cough: Non-productive  O2 Device: RA    Vitals:  Blood pressure 153/90, pulse 80, temperature 98 °F (36 7 °C), resp  rate 20, height 5' 2" (1 575 m), weight 82 kg (180 lb 12 4 oz), SpO2 95 %  Imaging and other studies: I have personally reviewed pertinent reports        O2 Device: RA     Plan    Respiratory Plan: Home Bronchodilator Patient pathway

## 2022-10-06 NOTE — ASSESSMENT & PLAN NOTE
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 is, chronic opacification of bilateral frontal sinuses with partial dehiscence of the posterior wall, finding also concerning of posterior extension of left frontal opacity and recommended MRI with and without contrast   As per ED report case was discussed with ENT who recommended inpatient observation with inpatient MRI  On my encounter patient appears comfortable not in distress  Currently she does report having persistent headache however does report that headache improved intensity while she is in the emergency room  Denies neck stiffness, fever, chills, visual disturbances  Continue Tylenol, Motrin intermittently  Continue with IV hydration  ESR is normal limits  MRI of brain showed Bilobed 4 cm expansile 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  The mass has transgressed/smoothly eroded the inner table of the left frontal bone, extending into the adjacent extra-axial space, compressing the left gyrus rectus  Imaging features favor an enlarging mucocele  Other etiologies including fungal disease or perhaps less likely other mass lesions not excluded  Neurosurgical assessment recommended  TT neurosurgery for evaluation and recommendations  ENT recommendations appreciated  Continue supportive care

## 2022-10-07 VITALS
SYSTOLIC BLOOD PRESSURE: 137 MMHG | DIASTOLIC BLOOD PRESSURE: 81 MMHG | BODY MASS INDEX: 33.27 KG/M2 | OXYGEN SATURATION: 91 % | WEIGHT: 180.78 LBS | TEMPERATURE: 97.8 F | HEART RATE: 97 BPM | RESPIRATION RATE: 18 BRPM | HEIGHT: 62 IN

## 2022-10-07 PROCEDURE — 99446 NTRPROF PH1/NTRNET/EHR 5-10: CPT | Performed by: PHYSICIAN ASSISTANT

## 2022-10-07 PROCEDURE — 99239 HOSP IP/OBS DSCHRG MGMT >30: CPT | Performed by: STUDENT IN AN ORGANIZED HEALTH CARE EDUCATION/TRAINING PROGRAM

## 2022-10-07 RX ORDER — GUAIFENESIN/DEXTROMETHORPHAN 100-10MG/5
10 SYRUP ORAL EVERY 4 HOURS PRN
Qty: 30 ML | Refills: 0 | Status: SHIPPED | OUTPATIENT
Start: 2022-10-07 | End: 2022-10-07 | Stop reason: SDUPTHER

## 2022-10-07 RX ORDER — FLUOXETINE HYDROCHLORIDE 40 MG/1
40 CAPSULE ORAL 2 TIMES DAILY
Qty: 60 CAPSULE | Refills: 0 | Status: SHIPPED | OUTPATIENT
Start: 2022-10-07

## 2022-10-07 RX ORDER — HYDROCHLOROTHIAZIDE 25 MG/1
25 TABLET ORAL DAILY
Qty: 30 TABLET | Refills: 0 | Status: SHIPPED | OUTPATIENT
Start: 2022-10-07

## 2022-10-07 RX ORDER — GUAIFENESIN/DEXTROMETHORPHAN 100-10MG/5
10 SYRUP ORAL EVERY 4 HOURS PRN
Qty: 354 ML | Refills: 0 | Status: SHIPPED | OUTPATIENT
Start: 2022-10-07

## 2022-10-07 RX ORDER — ALBUTEROL SULFATE 2.5 MG/3ML
2.5 SOLUTION RESPIRATORY (INHALATION) EVERY 6 HOURS PRN
Status: DISCONTINUED | OUTPATIENT
Start: 2022-10-07 | End: 2022-10-07 | Stop reason: HOSPADM

## 2022-10-07 RX ADMIN — SUCRALFATE 1 G: 1 TABLET ORAL at 09:49

## 2022-10-07 RX ADMIN — ARIPIPRAZOLE 5 MG: 5 TABLET ORAL at 09:49

## 2022-10-07 RX ADMIN — LEVOTHYROXINE SODIUM 50 MCG: 0.05 TABLET ORAL at 05:05

## 2022-10-07 RX ADMIN — BENZONATATE 100 MG: 100 CAPSULE ORAL at 09:50

## 2022-10-07 RX ADMIN — ALBUTEROL SULFATE 2 PUFF: 90 AEROSOL, METERED RESPIRATORY (INHALATION) at 09:48

## 2022-10-07 RX ADMIN — FAMOTIDINE 20 MG: 20 TABLET ORAL at 09:49

## 2022-10-07 RX ADMIN — HYDROCHLOROTHIAZIDE 25 MG: 25 TABLET ORAL at 09:49

## 2022-10-07 RX ADMIN — ASPIRIN 81 MG: 81 TABLET, CHEWABLE ORAL at 09:49

## 2022-10-07 RX ADMIN — BUDESONIDE AND FORMOTEROL FUMARATE DIHYDRATE 2 PUFF: 160; 4.5 AEROSOL RESPIRATORY (INHALATION) at 09:49

## 2022-10-07 RX ADMIN — FLUOXETINE HYDROCHLORIDE 40 MG: 20 CAPSULE ORAL at 09:49

## 2022-10-07 NOTE — DISCHARGE SUMMARY
[address] [facility]  Discharge- Jenny [facility] 1940, 81 y o  female MRN: [id number]  Unit/Bed#: -65 Encounter: [id number]  Primary Care Provider: Margaret Cabrera MD   Date and time admitted to hospital: 10/4/2022  5:31 AM    GERD (gastroesophageal reflux disease)  Assessment & Plan  Present admission with history of GERD  Resume home dose of PPI, Carafate  HLD (hyperlipidemia)  Assessment & Plan  Present on admission with history of hyperlipidemia  resume home dose of statin  CHUYITA (obstructive sleep apnea)  Assessment & Plan  Present on admission with history of sleep apnea  CPAP q h s  Hypothyroidism  Assessment & Plan  Present on admission with hypothyroidism  TSH within normal limits 1 60 in 04/2022  Levothyroxine 50 mcg daily  * Headache  Assessment & Plan  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 is, chronic opacification of bilateral frontal sinuses with partial dehiscence of the posterior wall, finding also concerning of posterior extension of left frontal opacity and recommended MRI with and without contrast   As per ED report case was discussed with ENT who recommended inpatient observation with inpatient MRI  On my encounter patient appears comfortable not in distress  Currently she does report having persistent headache however does report that headache improved intensity while she is in the emergency room  Denies neck stiffness, fever, chills, visual disturbances  Continue Tylenol, Motrin intermittently  Continue with IV hydration    ESR is normal limits  MRI of brain showed Bilobed 4 cm expansile 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  The mass has transgressed/smoothly eroded the inner table of the left frontal bone, extending into the adjacent extra-axial space, compressing the left gyrus rectus  Imaging features favor an enlarging mucocele  Other etiologies including fungal disease or perhaps less likely other mass lesions not excluded  Neurosurgical assessment recommended  TT neurosurgery for evaluation and recommendations  ENT seen patient, they recommend follow up with Dr Carina Ro, information listed in the discharge follow ups  Neurosurgery says follow with ENT first, if there is any further issues she may be referred to Neurosurgery follow up, no urgent need at this time  Medical Problems             Resolved Problems  Date Reviewed: 10/7/2022   None               Discharging Physician / Practitioner: Oksana Atkinson  PCP: Vivienne Sadler MD  Admission Date:   Admission Orders (From admission, onward)     Ordered        10/05/22 1333  Inpatient Admission  Once            10/04/22 0739  Place in Observation  Once                      Discharge Date: 10/07/22    Consultations During Hospital Stay:  ·  ENT    Procedures Performed:   CTH: Chronic microangiopathy      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 of the posterior wall, left greater than right  There appears to be slightly increased posterior extension of the left frontal opacity compared to February 2019  Follow-up with    MRI of brain:   1  Bilobed 4 cm expansile 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  The mass has transgressed/smoothly eroded the inner table of the left frontal bone, extending into the adjacent extra-axial space, compressing the left gyrus rectus  Imaging features favor an   enlarging mucocele  Other etiologies including fungal disease or perhaps less likely other mass lesions not excluded  Neurosurgical assessment recommended    2   Moderate, chronic microangiopathy  3   No acute infarction, intracranial hemorrhage  CXR: No acute cardiopulmonary disease        Significant Findings / Test Results:   · As above     Incidental Findings:   · None      Test Results Pending at Discharge (will require follow up): · None      Outpatient Tests Requested:  · Needs ENT follow up     Complications:  None     Reason for Admission: Headache    Hospital Course:   Angelina Whitney is a 81 y o  female with history of sleep apnea, hypothyroidism, hyperlipidemia, HLD, GERD who originally presented to the hospital on 10/4/2022 due to headache  Headache is mostly localized to the left sided frontal side  She says she has pain around her eye as well  She has chronic congestion and multiple episodes of bronchitis she was following with pulmonary  She has GERD and she is on PPI, also on nebulization for the cough and bronchitis  Head CT was concern for chronic opacification of bilateral frontal sinuses with partial dehiscence of the posterior wall, findings also concerning of posterior extension of the left frontal opacity and recommended MRI with and without contrast  MRI showed showed Bilobed 4 cm expansile 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   The mass has transgressed/smoothly eroded the inner table of the left frontal bone, extending into the adjacent extra-axial space, compressing the left gyrus rectus   Imaging features favor an enlarging mucocele   Other etiologies including fungal disease or perhaps less likely other mass lesions not excluded   Neurosurgical assessment recommended  Neurosurgery said patient needs to see ENT urgently for outpatient surgical intervention  Neurosurgery agreed with ENT plan  Please see above list of diagnoses and related plan for additional information  Condition at Discharge: fair    Discharge Day Visit / Exam:   Subjective:  Patient no longer has headache, vision issues, pain, chest pain, nausea or vomiting or dizziness   She is ambulating and doing well  Vitals: Blood Pressure: 137/81 (10/07/22 0817)  Pulse: 97 (10/07/22 0817)  Temperature: 97 8 °F (36 6 °C) (10/07/22 0817)  Temp Source: Oral (10/06/22 0625)  Respirations: 18 (10/06/22 1435)  Height: 5' 2" (157 5 cm) (10/05/22 0546)  Weight - Scale: 82 kg (180 lb 12 4 oz) (10/05/22 0546)  SpO2: 91 % (10/07/22 0817)  Exam:   Physical Exam  Vitals and nursing note reviewed  Constitutional:       General: She is not in acute distress  Appearance: She is well-developed  HENT:      Head: Normocephalic and atraumatic  Eyes:      Conjunctiva/sclera: Conjunctivae normal    Cardiovascular:      Rate and Rhythm: Normal rate and regular rhythm  Heart sounds: No murmur heard  Pulmonary:      Effort: Pulmonary effort is normal  No respiratory distress  Breath sounds: Normal breath sounds  No stridor  No rhonchi  Abdominal:      Palpations: Abdomen is soft  There is no mass  Tenderness: There is no abdominal tenderness  Hernia: No hernia is present  Musculoskeletal:         General: No swelling or deformity  Cervical back: Neck supple  Skin:     General: Skin is warm and dry  Coloration: Skin is not jaundiced  Findings: No bruising  Neurological:      General: No focal deficit present  Mental Status: She is alert and oriented to person, place, and time  Psychiatric:         Mood and Affect: Mood normal          Behavior: Behavior normal           Discussion with Family: Updated  () at bedside  Discharge instructions/Information to patient and family:   See after visit summary for information provided to patient and family  Provisions for Follow-Up Care:  See after visit summary for information related to follow-up care and any pertinent home health orders  Disposition:   Home    Planned Readmission: no     Discharge Statement:  I spent 45 minutes discharging the patient   This time was spent on the day of discharge  I had direct contact with the patient on the day of discharge  Greater than 50% of the total time was spent examining patient, answering all patient questions, arranging and discussing plan of care with patient as well as directly providing post-discharge instructions  Additional time then spent on discharge activities  Discharge Medications:  See after visit summary for reconciled discharge medications provided to patient and/or family        **Please Note: This note may have been constructed using a voice recognition system**

## 2022-10-07 NOTE — CONSULTS
Consultation - ENT   Merlin Gentile 81 y o  female MRN: [id number]  Unit/Bed#: -02 Encounter: [id number]      Assessment/Plan     Assessment:    81year old female patient with past medical history of sleep apnea, hypothyroidism, hyperlipidemia, presented to emergency room with complaining of sudden onset of left-sided frontal headache  Denies any visual disturbances  CT head showed No evidence of acute intracranial process  Chronic microangiopathy  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 of the posterior wall, left greater than right  There appears to be slightly increased posterior extension of the left frontal opacity compared to February 2019  Follow-up with   MRI brain 10/5/22 showed 1  Bilobed 4 cm expansile 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  The mass has transgressed/smoothly eroded the inner table of the left frontal bone, extending into the adjacent extra-axial space, compressing the left gyrus rectus  Imaging features favor an   enlarging mucocele  Other etiologies including fungal disease or perhaps less likely other mass lesions not excluded  Neurosurgical assessment recommended  2   Moderate, chronic microangiopathy  3   No acute infarction, intracranial hemorrhage  Plan:  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  Will arrange for urgent outpatient appointment with Dr Jane Rizzo for sinus surgery and mucocele drainage       History of Present Illness   Physician Requesting Consult: Li Don MD  Reason for Consult / Principal Problem: frontal sinus mucocele   HPI: Merlin Gentile is a 81y o  year old female who presents with     81year old female patient with past medical history of sleep apnea, hypothyroidism, hyperlipidemia, presented to emergency room with complaining of sudden onset of left-sided frontal headache  Denies any visual disturbances  CT head showed No evidence of acute intracranial process    Chronic microangiopathy  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 of the posterior wall, left greater than right  There appears to be slightly increased posterior extension of the left frontal opacity compared to 2019  Follow-up with   MRI brain 10/5/22 showed 1  Bilobed 4 cm expansile 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  The mass has transgressed/smoothly eroded the inner table of the left frontal bone, extending into the adjacent extra-axial space, compressing the left gyrus rectus  Imaging features favor an   enlarging mucocele  Other etiologies including fungal disease or perhaps less likely other mass lesions not excluded  Neurosurgical assessment recommended  2   Moderate, chronic microangiopathy  3   No acute infarction, intracranial hemorrhage  Consults    Review of Systems  Gen: no fatigue, no fevers/chills  ENT: as per HPI  GI: +/- GERD;    Allergy/Immun: no allergies/asthma  Neuro: no headache  Heme: no bleeding/bruising concerns  Endocrine: hot/cold intolerance  Pulm: no SOB; no asthma; no cough  CV: no chest pain or palpitations  Derm: no rashes      Historical Information   Past Medical History:   Diagnosis Date    Anxiety     COPD (chronic obstructive pulmonary disease) (HCC)     Depression     Disease of thyroid gland     Hyperlipidemia     Hypotension     Wrist fracture      Past Surgical History:   Procedure Laterality Date    BACK SURGERY  2017    INCONTINENCE SURGERY      NEPHRECTOMY TRANSPLANTED ORGAN      REPLACEMENT TOTAL KNEE BILATERAL       Social History   Social History     Substance and Sexual Activity   Alcohol Use No     Social History     Substance and Sexual Activity   Drug Use No     Social History     Tobacco Use   Smoking Status Former Smoker    Packs/day: 0 25    Years: 30 00    Pack years: 7 50    Start date: 1970   Adrian Akers Quit date: 2000    Years since quittin 7   Smokeless Tobacco Never Used     Family History: non-contributory    Meds/Allergies   all current active meds have been reviewed  No current facility-administered medications on file prior to encounter       Current Outpatient Medications on File Prior to Encounter   Medication Sig Dispense Refill    albuterol (ACCUNEB) 0 63 MG/3ML nebulizer solution Take 3 mL (0 63 mg total) by nebulization every 6 (six) hours as needed for wheezing or shortness of breath 1080 mL 3    albuterol (PROVENTIL HFA,VENTOLIN HFA) 90 mcg/act inhaler Inhale 1 puff every 6 (six) hours as needed for wheezing 3 Inhaler 5    aspirin 81 mg chewable tablet Chew 81 mg daily      benzonatate (TESSALON PERLES) 100 mg capsule Take 100 mg by mouth 3 (three) times a day as needed for cough      budesonide-formoterol (SYMBICORT) 160-4 5 mcg/act inhaler       famotidine (PEPCID) 20 mg tablet Take 1 tablet (20 mg total) by mouth daily 20 tablet 0    FLUoxetine (PROzac) 40 MG capsule Take 40 mg by mouth daily      levothyroxine 50 mcg tablet Take 50 mcg by mouth daily      montelukast (SINGULAIR) 10 mg tablet montelukast 10 mg tablet      pantoprazole (PROTONIX) 40 mg tablet Take 40 mg by mouth daily      predniSONE 20 mg tablet Take 2 tablets (40 mg total) by mouth daily for 5 days 10 tablet 0    simvastatin (ZOCOR) 10 mg tablet Take 10 mg by mouth daily at bedtime      sucralfate (CARAFATE) 1 g tablet Take 1 tablet (1 g total) by mouth 4 (four) times a day Before meals and before bed as needed for reflux 90 tablet 0    ARIPiprazole (ABILIFY) 5 mg tablet aripiprazole 5 mg tablet   TAKE 1 TABLET BY MOUTH EVERY DAY (Patient not taking: Reported on 10/4/2022)      azelastine (ASTELIN) 0 1 % nasal spray 1 spray into each nostril 2 (two) times a day Use in each nostril as directed (Patient not taking: Reported on 10/4/2022) 1 Bottle 3    B Complex-Folic Acid (B COMPLEX FORMULA 1) TABS Take by mouth (Patient not taking: Reported on 10/4/2022)      budesonide-formoterol (Symbicort) 160-4 5 mcg/act inhaler Inhale 2 puffs 2 (two) times a day Rinse mouth after use   30 6 g 2    celecoxib (CeleBREX) 200 mg capsule celecoxib 200 mg capsule (Patient not taking: Reported on 10/4/2022)      ciprofloxacin (CIPRO) 500 mg tablet ciprofloxacin 500 mg tablet   TAKE 1 TABLET BY MOUTH EVERY 12 HOURS FOR 5 DAYS (Patient not taking: Reported on 10/4/2022)      clotrimazole (MYCELEX) 10 mg james Take 1 tablet (10 mg total) by mouth 5 (five) times a day (Patient not taking: Reported on 10/4/2022) 70 tablet 0    diazepam (VALIUM) 5 mg tablet diazepam 5 mg tablet (Patient not taking: Reported on 10/4/2022)      doxycycline hyclate (VIBRAMYCIN) 100 mg capsule doxycycline hyclate 100 mg capsule (Patient not taking: Reported on 10/4/2022)      famotidine (PEPCID) 40 MG tablet famotidine 40 mg tablet      fluticasone (FLONASE) 50 mcg/act nasal spray fluticasone propionate 50 mcg/actuation nasal spray,suspension   SPRAY 1 SPRAY INTO EACH NOSTRIL EVERY DAY (Patient not taking: Reported on 10/4/2022)      hydrochlorothiazide (HYDRODIURIL) 25 mg tablet Take 25 mg by mouth daily (Patient not taking: Reported on 10/4/2022)      HYDROcodone-acetaminophen (NORCO) 5-325 mg per tablet hydrocodone 5 mg-acetaminophen 325 mg tablet (Patient not taking: Reported on 10/4/2022)      ibuprofen (MOTRIN) 600 mg tablet ibuprofen 600 mg tablet (Patient not taking: Reported on 10/4/2022)      ibuprofen (MOTRIN) 800 mg tablet Take 1 tablet (800 mg total) by mouth 3 (three) times a day (Patient not taking: Reported on 10/4/2022) 21 tablet 0    ipratropium (ATROVENT) 0 06 % nasal spray ipratropium bromide 42 mcg (0 06 %) nasal spray (Patient not taking: Reported on 10/4/2022)      LORazepam (ATIVAN) 0 5 mg tablet lorazepam 0 5 mg tablet (Patient not taking: Reported on 10/4/2022)      meloxicam (MOBIC) 15 mg tablet meloxicam 15 mg tablet (Patient not taking: Reported on 10/4/2022)      sodium chloride (OCEAN) 0 65 % nasal spray 1 spray into each nostril as needed for congestion or rhinitis (Patient not taking: Reported on 10/4/2022) 15 mL 3    Zoster Vaccine Live 92874 UNT/0 65ML SUSR  (Patient not taking: Reported on 10/4/2022)         Allergies   Allergen Reactions    Seasonal Ic [Cholestatin] Fatigue    Ascorbate - Food Allergy Rash     Reports allergy to fresh fruit         Objective     Vitals:    10/05/22 2331 10/06/22 0103 10/06/22 0625 10/06/22 1435   BP: 153/90  142/85 120/67   BP Location:       Pulse:  80 81 88   Resp: 19 20 18 18   Temp: 98 °F (36 7 °C)  98 1 °F (36 7 °C) 98 °F (36 7 °C)   TempSrc:   Oral    SpO2:  95% 92% 91%   Weight:       Height:             Intake/Output Summary (Last 24 hours) at 10/6/2022 2108  Last data filed at 10/6/2022 1713  Gross per 24 hour   Intake 1040 ml   Output --   Net 1040 ml       Invasive Devices  Report    Peripheral Intravenous Line  Duration           Peripheral IV 10/04/22 Left Antecubital 2 days                Physical Exam  Gen: NAD, awake/alert, no stridor  Voice:   Head: Normocephalic/atraumatic  Face: symmetric  Ears: pinnae unremarkable; EAC patent; TM intact/translucent/without OME  Nose: Nasal endoscopic examination showed deviated nasal septum, bilateral enlarged inferior turbinates, no polyps, thick clear mucus, middle, superior meatus and sphenoethmoid recess clear  Oral cavity: no lesions; tongue soft/mobile  Oropharynx: no lesions; tonsils without ulceration/exudate; soft palate/posterior wall unremarkable  Neck:soft, nontender, no masses or LAD  CN: II-XII grossly intact  Hearing: grossly intact with finger rub  Salivary glands: parotids/submandibular glands soft, nontender    Procedure:  Rigid nasal endoscopic examination:  The nasal cavities were decongested with lidocaine and oxymetazoline spray  Bilateral nasal endoscopy was performed as follows:  Endoscopy type: 0 degree rigid scope  Results: look above  The patient tolerated the procedure well      Lab Results: I have personally reviewed pertinent lab results  Imaging Studies: I have personally reviewed pertinent reports  EKG, Pathology, and Other Studies: I have personally reviewed pertinent reports  Code Status: Level 1 - Full Code  Advance Directive and Living Will: Yes    Power of :    POLST:      Counseling/Coordination of Care: Total floor / unit time spent today 60 minutes  Greater than 50% of total time was spent with the patient and / or family counseling and / or coordination of care   A description of the counseling / coordination of care: given

## 2022-10-07 NOTE — RESPIRATORY THERAPY NOTE
RT Protocol Note  Marquis Reddyly 81 y o  female MRN: [id number]  Unit/Bed#: -02 Encounter: [id number]    Assessment    Principal Problem:    Headache  Active Problems:    Hypothyroidism    CHUYITA (obstructive sleep apnea)    HLD (hyperlipidemia)    GERD (gastroesophageal reflux disease)      Home Pulmonary Medications:  PRN albuterol MDI       Past Medical History:   Diagnosis Date    Anxiety     COPD (chronic obstructive pulmonary disease) (HCC)     Depression     Disease of thyroid gland     Hyperlipidemia     Hypotension     Wrist fracture      Social History     Socioeconomic History    Marital status:      Spouse name: None    Number of children: 3    Years of education: None    Highest education level: None   Occupational History    Occupation: retired   Tobacco Use    Smoking status: Former Smoker     Packs/day: 0 25     Years: 30 00     Pack years: 7 50     Start date: 1970     Quit date: 2000     Years since quittin 7    Smokeless tobacco: Never Used   Vaping Use    Vaping Use: Never used   Substance and Sexual Activity    Alcohol use: No    Drug use: No    Sexual activity: Not Currently     Partners: Male     Birth control/protection: None   Other Topics Concern    None   Social History Narrative    None     Social Determinants of Health     Financial Resource Strain: Not on file   Food Insecurity: Not on file   Transportation Needs: Not on file   Physical Activity: Not on file   Stress: Not on file   Social Connections: Not on file   Intimate Partner Violence: Not on file   Housing Stability: Not on file       Subjective         Objective    Physical Exam:   Assessment Type: Assess only  General Appearance: Awake  Respiratory Pattern: Normal  Bilateral Breath Sounds: Clear  Cough: None    Vitals:  Blood pressure 137/81, pulse 97, temperature 97 8 °F (36 6 °C), resp  rate 18, height 5' 2" (1 575 m), weight 82 kg (180 lb 12 4 oz), SpO2 91 %            Imaging and other studies: I have personally reviewed pertinent reports  O2 Device: RA     Plan    Respiratory Plan: No distress/Pulmonary history        Resp Comments: sitting up in bed, No distress or SOB  BS dim clear  RA ambulation completed by RN yesterday with sats of 89%  Home O2 eval is not indicated at this time  Universal Safety Interventions

## 2022-10-07 NOTE — ASSESSMENT & PLAN NOTE
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 is, chronic opacification of bilateral frontal sinuses with partial dehiscence of the posterior wall, finding also concerning of posterior extension of left frontal opacity and recommended MRI with and without contrast   As per ED report case was discussed with ENT who recommended inpatient observation with inpatient MRI  On my encounter patient appears comfortable not in distress  Currently she does report having persistent headache however does report that headache improved intensity while she is in the emergency room  Denies neck stiffness, fever, chills, visual disturbances  Continue Tylenol, Motrin intermittently  Continue with IV hydration  ESR is normal limits  MRI of brain showed Bilobed 4 cm expansile 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  The mass has transgressed/smoothly eroded the inner table of the left frontal bone, extending into the adjacent extra-axial space, compressing the left gyrus rectus  Imaging features favor an enlarging mucocele  Other etiologies including fungal disease or perhaps less likely other mass lesions not excluded  Neurosurgical assessment recommended  TT neurosurgery for evaluation and recommendations  ENT seen patient, they recommend follow up with Dr Kathie Dozier, information listed in the discharge follow ups  Neurosurgery says follow with ENT first, if there is any further issues she may be referred to Neurosurgery follow up, no urgent need at this time

## 2022-10-07 NOTE — DISCHARGE INSTR - AVS FIRST PAGE
Recommended close follow up with primary care provider in 3-5 days of discharge  Recommended close follow up with ENT

## 2022-10-07 NOTE — TELEMEDICINE
On-Call Telephone Note    Contacted by Dr Meeta Baez MD, DANIELA at [facility]  Inpatient consult to Neurosurgery  Consult performed by: Prachi Sage PA-C  Consult ordered by: Rosalio Walters MD        Arianna Cronin 81 y o  female MRN: [id number]  Unit/Bed#: -02 Encounter: [id number]    Per provider report, patient presents with sudden-onset left-sided frontal headache without visual changes  Available past medical history,social history, surgical history, medication list, drug allergies and review of systems were reviewed  /81   Pulse 97   Temp 97 8 °F (36 6 °C)   Resp 18   Ht 5' 2" (1 575 m)   Wt 82 kg (180 lb 12 4 oz)   SpO2 91%   BMI 33 06 kg/m²      Clinical exam per provider report, nonfocal   No reported deficits  Headaches well controlled  Imaging personally reviewed  MRI with bilobed 4 cm expansile mass in the frontal sinus extending and eroding through the inner table of the left frontal bone  Compression of the left gyrus rectus  Imaging features favor an large mucocele  Assessment and Plan  1  Monitor neurologic exam  2  ENT consultation completed 10/6  3  Imaging was reviewed with attending neurosurgeons  Agree with ENT plan established yesterday  4  Will be available as needed  All questions answered  Provider is in agreement with the course of action  A total of 10 minutes was spent discussing the presentation, physical exam and formulating a management plan with the provider

## 2022-10-07 NOTE — INCIDENTAL FINDINGS
CTH:  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 of the posterior wall, left greater than right   There appears to be slightly increased posterior extension of the left frontal opacity     ENT following patient   Follow up with ENT outpatient

## 2022-11-07 ENCOUNTER — HOSPITAL ENCOUNTER (OUTPATIENT)
Dept: CT IMAGING | Facility: HOSPITAL | Age: 82
Discharge: HOME | End: 2022-11-07
Attending: OTOLARYNGOLOGY

## 2022-11-07 ENCOUNTER — APPOINTMENT (OUTPATIENT)
Dept: LAB | Facility: HOSPITAL | Age: 82
End: 2022-11-07

## 2022-11-07 ENCOUNTER — OFFICE VISIT (OUTPATIENT)
Dept: LAB | Facility: HOSPITAL | Age: 82
End: 2022-11-07

## 2022-11-07 DIAGNOSIS — J32.1 CHRONIC FRONTAL SINUSITIS: ICD-10-CM

## 2022-11-07 DIAGNOSIS — J32.0 CHRONIC MAXILLARY SINUSITIS: ICD-10-CM

## 2022-11-07 DIAGNOSIS — J32.2 CHRONIC ETHMOIDAL SINUSITIS: ICD-10-CM

## 2022-11-07 DIAGNOSIS — Z01.818 PRE-OP TESTING: ICD-10-CM

## 2022-11-07 DIAGNOSIS — J34.1 MUCOCELE OF FRONTAL SINUS: ICD-10-CM

## 2022-11-07 LAB
ANION GAP SERPL CALCULATED.3IONS-SCNC: 12 MMOL/L (ref 4–13)
BASOPHILS # BLD AUTO: 0.07 THOUSANDS/ÂΜL (ref 0–0.1)
BASOPHILS NFR BLD AUTO: 1 % (ref 0–1)
BUN SERPL-MCNC: 19 MG/DL (ref 5–25)
CALCIUM SERPL-MCNC: 9.2 MG/DL (ref 8.3–10.1)
CHLORIDE SERPL-SCNC: 101 MMOL/L (ref 96–108)
CO2 SERPL-SCNC: 27 MMOL/L (ref 21–32)
CREAT SERPL-MCNC: 0.72 MG/DL (ref 0.6–1.3)
EOSINOPHIL # BLD AUTO: 0.13 THOUSAND/ÂΜL (ref 0–0.61)
EOSINOPHIL NFR BLD AUTO: 2 % (ref 0–6)
ERYTHROCYTE [DISTWIDTH] IN BLOOD BY AUTOMATED COUNT: 13.4 % (ref 11.6–15.1)
GFR SERPL CREATININE-BSD FRML MDRD: 78 ML/MIN/1.73SQ M
GLUCOSE SERPL-MCNC: 98 MG/DL (ref 65–140)
HCT VFR BLD AUTO: 43 % (ref 34.8–46.1)
HGB BLD-MCNC: 14.4 G/DL (ref 11.5–15.4)
IMM GRANULOCYTES # BLD AUTO: 0.01 THOUSAND/UL (ref 0–0.2)
IMM GRANULOCYTES NFR BLD AUTO: 0 % (ref 0–2)
LYMPHOCYTES # BLD AUTO: 1.03 THOUSANDS/ÂΜL (ref 0.6–4.47)
LYMPHOCYTES NFR BLD AUTO: 17 % (ref 14–44)
MCH RBC QN AUTO: 29.8 PG (ref 26.8–34.3)
MCHC RBC AUTO-ENTMCNC: 33.5 G/DL (ref 31.4–37.4)
MCV RBC AUTO: 89 FL (ref 82–98)
MONOCYTES # BLD AUTO: 0.56 THOUSAND/ÂΜL (ref 0.17–1.22)
MONOCYTES NFR BLD AUTO: 9 % (ref 4–12)
NEUTROPHILS # BLD AUTO: 4.16 THOUSANDS/ÂΜL (ref 1.85–7.62)
NEUTS SEG NFR BLD AUTO: 71 % (ref 43–75)
NRBC BLD AUTO-RTO: 0 /100 WBCS
PLATELET # BLD AUTO: 239 THOUSANDS/UL (ref 149–390)
PMV BLD AUTO: 9.8 FL (ref 8.9–12.7)
POTASSIUM SERPL-SCNC: 3.9 MMOL/L (ref 3.5–5.3)
RBC # BLD AUTO: 4.83 MILLION/UL (ref 3.81–5.12)
SODIUM SERPL-SCNC: 140 MMOL/L (ref 135–147)
WBC # BLD AUTO: 5.96 THOUSAND/UL (ref 4.31–10.16)

## 2022-11-08 LAB
ATRIAL RATE: 69 BPM
P AXIS: 18 DEGREES
PR INTERVAL: 148 MS
QRS AXIS: 18 DEGREES
QRSD INTERVAL: 78 MS
QT INTERVAL: 426 MS
QTC INTERVAL: 456 MS
T WAVE AXIS: 45 DEGREES
VENTRICULAR RATE: 69 BPM

## 2022-11-16 ENCOUNTER — APPOINTMENT (OUTPATIENT)
Dept: PREADMISSION TESTING | Facility: HOSPITAL | Age: 82
End: 2022-11-16

## 2022-11-18 ENCOUNTER — ANESTHESIA EVENT (OUTPATIENT)
Dept: PERIOP | Facility: HOSPITAL | Age: 82
End: 2022-11-18

## 2022-11-18 RX ORDER — HYDROCHLOROTHIAZIDE 25 MG/1
25 TABLET ORAL DAILY
COMMUNITY

## 2022-11-18 RX ORDER — FAMOTIDINE 40 MG/1
40 TABLET, FILM COATED ORAL DAILY
COMMUNITY
Start: 2022-11-09

## 2022-11-18 NOTE — PRE-PROCEDURE INSTRUCTIONS
Pre-Surgery Instructions:   Medication Instructions   • albuterol (ACCUNEB) 0 63 MG/3ML nebulizer solution Uses PRN- OK to take day of surgery   • albuterol (PROVENTIL HFA,VENTOLIN HFA) 90 mcg/act inhaler Uses PRN- OK to take day of surgery   • aspirin 81 mg chewable tablet Stop taking 5 days prior to surgery  • budesonide-formoterol (SYMBICORT) 160-4 5 mcg/act inhaler Take day of surgery  • famotidine (PEPCID) 40 MG tablet Take day of surgery  • FLUoxetine (PROzac) 40 MG capsule Take day of surgery  • levothyroxine 50 mcg tablet Take day of surgery  • montelukast (SINGULAIR) 10 mg tablet Take night before surgery   • simvastatin (ZOCOR) 10 mg tablet Take night before surgery   •  hydrochlorothiazide (HYDRODIURIL) 25 mg tablet Hold day of surgery        Pt verbalizes understanding of the following:    - Bathing instructions, will use dial  - No lotions, powders, sprays, deodorant, jewelry ( has a ring right hand she can't get off), or make-up    - NPO after MN  - Avoid OTC non-directed Vit/ Suppl/ Herbals 7 days prior to surgery to ensure no drug interactions with perioperative surgical/ anesthetic meds  - Avoid NSAIDs 3 days prior  - Avoid ASA containing products 5 days prior  - Continue statin medication up through and including the day of surgery    - Bring list of meds with last dose noted  - "[email or web address]" cards & photo id

## 2022-11-27 ENCOUNTER — ANESTHESIA (OUTPATIENT)
Dept: ANESTHESIOLOGY | Facility: HOSPITAL | Age: 82
End: 2022-11-27

## 2022-11-27 ENCOUNTER — ANESTHESIA EVENT (OUTPATIENT)
Dept: ANESTHESIOLOGY | Facility: HOSPITAL | Age: 82
End: 2022-11-27

## 2022-11-28 ENCOUNTER — ANESTHESIA (OUTPATIENT)
Dept: PERIOP | Facility: HOSPITAL | Age: 82
End: 2022-11-28

## 2022-11-28 ENCOUNTER — HOSPITAL ENCOUNTER (OUTPATIENT)
Facility: HOSPITAL | Age: 82
Setting detail: OUTPATIENT SURGERY
Discharge: HOME | End: 2022-11-28
Attending: OTOLARYNGOLOGY | Admitting: OTOLARYNGOLOGY

## 2022-11-28 VITALS
OXYGEN SATURATION: 97 % | SYSTOLIC BLOOD PRESSURE: 119 MMHG | HEIGHT: 62 IN | HEART RATE: 88 BPM | DIASTOLIC BLOOD PRESSURE: 78 MMHG | TEMPERATURE: 98.8 F | BODY MASS INDEX: 33.13 KG/M2 | RESPIRATION RATE: 16 BRPM | WEIGHT: 180 LBS

## 2022-11-28 DIAGNOSIS — J32.0 CHRONIC MAXILLARY SINUSITIS: ICD-10-CM

## 2022-11-28 DIAGNOSIS — J34.1 MUCOCELE OF FRONTAL SINUS: Primary | ICD-10-CM

## 2022-11-28 RX ORDER — DEXAMETHASONE SODIUM PHOSPHATE 10 MG/ML
INJECTION, SOLUTION INTRAMUSCULAR; INTRAVENOUS AS NEEDED
Status: DISCONTINUED | OUTPATIENT
Start: 2022-11-28 | End: 2022-11-28

## 2022-11-28 RX ORDER — FENTANYL CITRATE 50 UG/ML
INJECTION, SOLUTION INTRAMUSCULAR; INTRAVENOUS AS NEEDED
Status: DISCONTINUED | OUTPATIENT
Start: 2022-11-28 | End: 2022-11-28

## 2022-11-28 RX ORDER — ROCURONIUM BROMIDE 10 MG/ML
INJECTION, SOLUTION INTRAVENOUS AS NEEDED
Status: DISCONTINUED | OUTPATIENT
Start: 2022-11-28 | End: 2022-11-28

## 2022-11-28 RX ORDER — ONDANSETRON 2 MG/ML
INJECTION INTRAMUSCULAR; INTRAVENOUS AS NEEDED
Status: DISCONTINUED | OUTPATIENT
Start: 2022-11-28 | End: 2022-11-28

## 2022-11-28 RX ORDER — ALBUTEROL SULFATE 90 UG/1
AEROSOL, METERED RESPIRATORY (INHALATION) AS NEEDED
Status: DISCONTINUED | OUTPATIENT
Start: 2022-11-28 | End: 2022-11-28

## 2022-11-28 RX ORDER — ACETAMINOPHEN 325 MG/1
650 TABLET ORAL ONCE
Status: DISCONTINUED | OUTPATIENT
Start: 2022-11-28 | End: 2022-11-28 | Stop reason: HOSPADM

## 2022-11-28 RX ORDER — HYDROCODONE BITARTRATE AND ACETAMINOPHEN 5; 325 MG/1; MG/1
1 TABLET ORAL EVERY 6 HOURS PRN
Status: DISCONTINUED | OUTPATIENT
Start: 2022-11-28 | End: 2022-11-28 | Stop reason: HOSPADM

## 2022-11-28 RX ORDER — SODIUM CHLORIDE, SODIUM LACTATE, POTASSIUM CHLORIDE, CALCIUM CHLORIDE 600; 310; 30; 20 MG/100ML; MG/100ML; MG/100ML; MG/100ML
125 INJECTION, SOLUTION INTRAVENOUS CONTINUOUS
Status: DISCONTINUED | OUTPATIENT
Start: 2022-11-28 | End: 2022-11-28 | Stop reason: HOSPADM

## 2022-11-28 RX ORDER — HYDROCODONE BITARTRATE AND ACETAMINOPHEN 5; 325 MG/1; MG/1
1 TABLET ORAL EVERY 6 HOURS PRN
Qty: 10 TABLET | Refills: 0 | Status: SHIPPED | OUTPATIENT
Start: 2022-11-28 | End: 2022-12-08

## 2022-11-28 RX ORDER — LABETALOL HYDROCHLORIDE 5 MG/ML
INJECTION, SOLUTION INTRAVENOUS AS NEEDED
Status: DISCONTINUED | OUTPATIENT
Start: 2022-11-28 | End: 2022-11-28

## 2022-11-28 RX ORDER — ACETAMINOPHEN 325 MG/1
650 TABLET ORAL EVERY 6 HOURS PRN
Status: DISCONTINUED | OUTPATIENT
Start: 2022-11-28 | End: 2022-11-28 | Stop reason: HOSPADM

## 2022-11-28 RX ORDER — OXYMETAZOLINE HYDROCHLORIDE 0.05 G/100ML
SPRAY NASAL AS NEEDED
Status: DISCONTINUED | OUTPATIENT
Start: 2022-11-28 | End: 2022-11-28 | Stop reason: HOSPADM

## 2022-11-28 RX ORDER — PROPOFOL 10 MG/ML
INJECTION, EMULSION INTRAVENOUS AS NEEDED
Status: DISCONTINUED | OUTPATIENT
Start: 2022-11-28 | End: 2022-11-28

## 2022-11-28 RX ORDER — EPINEPHRINE NASAL SOLUTION 1 MG/ML
SOLUTION NASAL AS NEEDED
Status: DISCONTINUED | OUTPATIENT
Start: 2022-11-28 | End: 2022-11-28 | Stop reason: HOSPADM

## 2022-11-28 RX ORDER — SODIUM CHLORIDE, SODIUM LACTATE, POTASSIUM CHLORIDE, CALCIUM CHLORIDE 600; 310; 30; 20 MG/100ML; MG/100ML; MG/100ML; MG/100ML
INJECTION, SOLUTION INTRAVENOUS CONTINUOUS PRN
Status: DISCONTINUED | OUTPATIENT
Start: 2022-11-28 | End: 2022-11-28

## 2022-11-28 RX ORDER — FENTANYL CITRATE/PF 50 MCG/ML
50 SYRINGE (ML) INJECTION
Status: DISCONTINUED | OUTPATIENT
Start: 2022-11-28 | End: 2022-11-28 | Stop reason: HOSPADM

## 2022-11-28 RX ORDER — LIDOCAINE HYDROCHLORIDE AND EPINEPHRINE 10; 10 MG/ML; UG/ML
INJECTION, SOLUTION INFILTRATION; PERINEURAL AS NEEDED
Status: DISCONTINUED | OUTPATIENT
Start: 2022-11-28 | End: 2022-11-28 | Stop reason: HOSPADM

## 2022-11-28 RX ADMIN — ACETAMINOPHEN 650 MG: 325 TABLET ORAL at 14:36

## 2022-11-28 RX ADMIN — ROCURONIUM BROMIDE 30 MG: 10 SOLUTION INTRAVENOUS at 10:48

## 2022-11-28 RX ADMIN — FENTANYL CITRATE 50 MCG: 50 INJECTION INTRAMUSCULAR; INTRAVENOUS at 11:54

## 2022-11-28 RX ADMIN — PROPOFOL 50 MG: 10 INJECTION, EMULSION INTRAVENOUS at 10:49

## 2022-11-28 RX ADMIN — PHENYLEPHRINE HYDROCHLORIDE 20 MCG/MIN: 10 INJECTION INTRAVENOUS at 10:46

## 2022-11-28 RX ADMIN — LABETALOL HYDROCHLORIDE 2.5 MG: 5 INJECTION, SOLUTION INTRAVENOUS at 12:28

## 2022-11-28 RX ADMIN — FENTANYL CITRATE 25 MCG: 50 INJECTION INTRAMUSCULAR; INTRAVENOUS at 11:32

## 2022-11-28 RX ADMIN — SODIUM CHLORIDE, SODIUM LACTATE, POTASSIUM CHLORIDE, AND CALCIUM CHLORIDE 125 ML/HR: .6; .31; .03; .02 INJECTION, SOLUTION INTRAVENOUS at 08:55

## 2022-11-28 RX ADMIN — FENTANYL CITRATE 25 MCG: 50 INJECTION INTRAMUSCULAR; INTRAVENOUS at 11:51

## 2022-11-28 RX ADMIN — DEXAMETHASONE SODIUM PHOSPHATE 10 MG: 10 INJECTION INTRAMUSCULAR; INTRAVENOUS at 10:48

## 2022-11-28 RX ADMIN — ALBUTEROL SULFATE 2 PUFF: 90 AEROSOL, METERED RESPIRATORY (INHALATION) at 10:38

## 2022-11-28 RX ADMIN — LABETALOL HYDROCHLORIDE 2.5 MG: 5 INJECTION, SOLUTION INTRAVENOUS at 12:24

## 2022-11-28 RX ADMIN — PROPOFOL 150 MG: 10 INJECTION, EMULSION INTRAVENOUS at 10:48

## 2022-11-28 RX ADMIN — ONDANSETRON 4 MG: 2 INJECTION INTRAMUSCULAR; INTRAVENOUS at 11:52

## 2022-11-28 RX ADMIN — SUGAMMADEX 200 MG: 100 INJECTION, SOLUTION INTRAVENOUS at 12:23

## 2022-11-28 RX ADMIN — FENTANYL CITRATE 100 MCG: 50 INJECTION INTRAMUSCULAR; INTRAVENOUS at 10:47

## 2022-11-28 RX ADMIN — SODIUM CHLORIDE, SODIUM LACTATE, POTASSIUM CHLORIDE, AND CALCIUM CHLORIDE: .6; .31; .03; .02 INJECTION, SOLUTION INTRAVENOUS at 10:36

## 2022-11-28 NOTE — ANESTHESIA PREPROCEDURE EVALUATION
Procedure:  FUNCTIONAL ENDOSCOPIC SINUS SURGERY (FESS) IMAGED GUIDED LEFT MAXILLARY ANTROSTOMY ETHMOIDECTOMY FRONTAL SINUSOTOMY WITH DRAINAGE OF FRONTAL SINUS MUCOCELE (Nose)  SEPTOPLASTY (Nose)    Relevant Problems   CARDIO   (+) HLD (hyperlipidemia)      ENDO   (+) Hypothyroidism      GI/HEPATIC   (+) GERD (gastroesophageal reflux disease)      NEURO/PSYCH   (+) Headache      PULMONARY   (+) Asthma with COPD (HCC)   (+) COPD exacerbation (HCC)   (+) CHUYITA (obstructive sleep apnea)        Physical Exam    Airway    Mallampati score: III  TM Distance: <3 FB  Neck ROM: full     Dental   implants,     Cardiovascular  Cardiovascular exam normal    Pulmonary  Pulmonary exam normal     Other Findings        Anesthesia Plan  ASA Score- 3     Anesthesia Type- general with ASA Monitors  Additional Monitors:   Airway Plan: ETT  Comment: No cardiac complaints  Stable COPD  Plan Factors-Exercise tolerance (METS): >4 METS  Chart reviewed  EKG reviewed  Existing labs reviewed  Patient summary reviewed  Patient is not a current smoker  Induction- intravenous  Postoperative Plan- Plan for postoperative opioid use  Planned trial extubation    Informed Consent- Anesthetic plan and risks discussed with patient  I personally reviewed this patient with the CRNA  Discussed and agreed on the Anesthesia Plan with the CRNA  Kassie Dallas

## 2022-11-28 NOTE — H&P
[facility]  OTOLARYNGOLOGY - HEAD & NECK SURGERY    Oksana Cardenas  [id number]  1940    HISTORY & PHYSICAL    History of Present Illness: 81year old woman who presents for frontal sinus mucocele  No major issues since last being seen  HISTORICALLY:  27-year-old woman who presents for further evaluation of a frontal sinus mucocele  The patient states that she actually was admitted to the hospital for this several weeks ago  She woke up with a pounding headache  She had she thought that she had a pneumonia, but because of her headache symptoms they did perform a CT scan of the head  This did reveal abnormality in the frontal sinus on the left  They subsequently ordered an MRI for further evaluation of this  Her headache has since gotten less severe, but she still continues to have some headache  The imaging was more consistent with a frontal sinus mucocele and so she was referred here for further evaluation and treatment of this      Allergies   Allergen Reactions   • Seasonal Ic [Cholestatin] Fatigue   • Ascorbate - Food Allergy Rash     Reports allergy to fresh fruit       Past Medical History:   Diagnosis Date   • Anxiety    • Arthritis    • Asthma    • COPD (chronic obstructive pulmonary disease) ([facility] [address] )    • COVID 2022   • CPAP (continuous positive airway pressure) dependence    • Depression    • Disease of thyroid gland    • GERD (gastroesophageal reflux disease)    • History of transfusion    • Hyperlipidemia    • Hypotension    • MRSA (methicillin resistant Staphylococcus aureus) infection     about 15yrs ago   • Shortness of breath     HUNTER   • Sleep apnea    • Use of cane as ambulatory aid     sometimes   • Wears glasses    • Wrist fracture        Past Surgical History:   Procedure Laterality Date   • BACK SURGERY  2017   •  SECTION      x3   • COLONOSCOPY     • DILATION AND EVACUATION      miscarriage   • EGD     • INCONTINENCE SURGERY     • JOINT REPLACEMENT Left     left knee sugery x2   • REPLACEMENT TOTAL KNEE BILATERAL     • TUBAL LIGATION         History reviewed  No pertinent family history  Social History     Tobacco Use   • Smoking status: Former     Packs/day: 0 25     Years: 30 00     Pack years: 7 50     Types: Cigarettes     Start date: 1970     Quit date: 2000     Years since quittin 9   • Smokeless tobacco: Never   Vaping Use   • Vaping Use: Never used   Substance Use Topics   • Alcohol use: Yes     Comment: occasionally cinnamon whiskey   • Drug use: No       No current facility-administered medications on file prior to encounter  Current Outpatient Medications on File Prior to Encounter   Medication Sig Dispense Refill   • aspirin 81 mg chewable tablet Chew 81 mg daily     • budesonide-formoterol (SYMBICORT) 160-4 5 mcg/act inhaler Inhale 2 puffs 2 (two) times a day     • famotidine (PEPCID) 40 MG tablet Take 40 mg by mouth in the morning     • FLUoxetine (PROzac) 40 MG capsule Take 1 capsule (40 mg total) by mouth 2 (two) times a day 60 capsule 0   • hydrochlorothiazide (HYDRODIURIL) 25 mg tablet Take 25 mg by mouth in the morning     • levothyroxine 50 mcg tablet Take 50 mcg by mouth daily     • montelukast (SINGULAIR) 10 mg tablet Take 10 mg by mouth daily at bedtime     • simvastatin (ZOCOR) 10 mg tablet Take 10 mg by mouth daily at bedtime     • albuterol (ACCUNEB) 0 63 MG/3ML nebulizer solution Take 3 mL (0 63 mg total) by nebulization every 6 (six) hours as needed for wheezing or shortness of breath 1080 mL 3   • albuterol (PROVENTIL HFA,VENTOLIN HFA) 90 mcg/act inhaler Inhale 1 puff every 6 (six) hours as needed for wheezing 3 Inhaler 5       Review of Systems:  10-point ROS performed  Patient denies fevers or chills  All other systems reviewed and found to be negative unless otherwise noted in the HPI or MA note         Vitals:    22 1031 22 0830   BP:  149/78   Pulse:  70   Resp:  18   Temp:  98 1 °F (36 7 °C) TempSrc:  Temporal   SpO2:  97%   Weight: 81 6 kg (180 lb) 81 6 kg (180 lb)   Height: 5' 2" (1 575 m) 5' 2" (1 575 m)         General Appearance: No apparent distress    Head: Normocephalic, atraumatic  Face: Symmetric without obvious lesions  Eyes: Conjunctiva clear, extraocular movements are intact  Ears: Pinna normal shape and position  Nose: External pyramid midline  Oral cavity/Oropharynx: No mucosal lesions, masses, or pharyngeal asymmetry  Neck: No cervical lymphadenopathy or masses appreciated    Skin: Skin warm and dry  Neurological: Cranial nerves II to XII are intact  Respiratory: No stridor or labored breathing  CTAB    Cardiovascular: Good perfusion of the upper extremities  No cyanosis of the fingers or hands  RRR  Psychiatric: Alert and oriented  Abdomen: Soft    Extremities: No cyanosis    Data Reviewed: CT scan of the head as well as MRI of the brain is reviewed by me  Images were reviewed personally by myself and I agree with the impression  There does appear to be primarily left-sided frontal mucocele which is eroding the posterior table of the frontal sinus  Assessment:  Frontal sinus mucocele    Plan: To OR for FESS, drainage of frontal sinus mucocele    Sylvie Baer MD  Otolaryngology - Head & Neck Surgery  [facility]            ** Please Note: Dictation voice to text software may have been used in the creation of this document   **

## 2022-11-28 NOTE — ANESTHESIA POSTPROCEDURE EVALUATION
Post-Op Assessment Note    CV Status:  Stable  Pain Score: 0    Pain management: adequate     Mental Status:  Alert and awake   Hydration Status:  Euvolemic   PONV Controlled:  Controlled   Airway Patency:  Patent      Post Op Vitals Reviewed: Yes      Staff: Anesthesiologist, CRNA         No notable events documented      BP   147/81   Temp 97 6 °F (36 4 °C) (11/28/22 1243)    Pulse 82 (11/28/22 1243)   Resp   11   SpO2   99 face mask

## 2022-11-30 LAB — BACTERIA SPEC ANAEROBE CULT: NORMAL

## 2022-11-30 NOTE — OP NOTE
OPERATIVE REPORT  PATIENT NAME: Dima Hazel    :  1940  MRN: [id number]  Pt Location: [facility] OR ROOM 06    SURGERY DATE: 2022    Surgeon(s) and Role:     * Alea Bañuelos MD - Primary    Preop Diagnosis:  Chronic maxillary sinusitis [J32 0]  Chronic ethmoid sinusitis  Chronic frontal sinusitis  Frontal sinus mucocele      Post-Op Diagnosis Codes:  Chronic maxillary sinusitis [J32 0]  Chronic ethmoid sinusitis  Chronic frontal sinusitis  Frontal sinus mucocele    Procedure(s) (LRB):  Functional endoscopic sinus surgery includin  Left maxillary antrostomy  2  Left partial ethmoidectomy  3  Left frontal sinusotomy, Draf IIB, with drainage of frontal sinus mucocele (-22 modifier)    Specimen(s):  ID Type Source Tests Collected by Time Destination   1 : chronic maxillary sinus contents Tissue Maxillary Sinus TISSUE EXAM Alea Bañuelos MD 2022 11:32 AM    A :  Body Fluid Frontal Sinus ANAEROBIC CULTURE AND GRAM STAIN, FUNGAL CULTURE, BODY FLUID CULTURE AND GRAM STAIN Alea Bañuelos MD 2022 11:42 AM        Estimated Blood Loss:   Minimal    Drains:  * No LDAs found *    Anesthesia Type:   General    Operative Indications:  Chronic maxillary sinusitis [J32 0]  Chronic ethmoid sinusitis  Chronic frontal sinusitis  Frontal sinus mucocele    Operative Findings:  Patient with large mucopyocele with copious amounts of pus mixed with mucus in the frontal sinus  No CSF leak was seen  Complications:   None    Procedure and Technique:  After the patient was allowed to ask any remaining questions in the preoperative area, the patient was escorted to the operating suite by both ENT and anesthesia  There, a surgical timeout was performed to ensure the proper patient and procedure  Once this was done, general endotracheal anesthesia was induced without incident  Once given the go ahead by anesthesia, the head of the bed was rotated 100°    The JoinMe@ navigation device was applied to the patient's forehead and registered without incident  Afrin-soaked pledgets were placed into the left nasal cavity  The patient was then prepped and draped in normal fashion for the above procedures  After time was given, the pledgets were removed  The nose was examined with a 30° endoscope  1% lidocaine with epinephrine 1-100,000 was injected into the patient's middle turbinate, lateral wall  The middle turbinate was then gently medialized with a [place]  Additional Afrin-soaked pledgets were placed into the middle meatus and the rest of the nasal cavity for additional hemostasis and decongestion  After time was given, the pledgets were removed  The uncinate process was back fractured using a ball-tipped seeker and backbiter  The exact process was then removed using the Medtronic microdebrider as well as hand instruments  The natural ostium of the maxillary antrostomy was identified and connected to the rest of the antrostomy more posteriorly  This was done using the microdebrider, backbiter, down biter, and straight through cut forceps  Dissection then proceeded more posteriorly  I did take down some of the anterior ethmoid cells including the ethmoid bulla to get more space as well as to get better visualization of the frontal recess  This was done with a curette as well as the Medtronic microdebrider  Using image guidance navigation, I was able to confirm the inferior edge of the mucocele cavity  This was just opened up using a ball-tipped seeker as well as hand instruments such as the Hosemann mushroom punch  Copious amount of mucus as well as pus was expressed from the frontal sinus  This appears to be a mucopyocele  This was gently suctioned out and sent for culture  At this point, I did not advance my suction into the frontal sinus but rather let the pus come out via of the pressure at first and using gentle suction to draw the fluid out    Once this was done, I was able to visualize the frontal sinus  Again, she did have some erosion of the posterior table, and so the pulsations of the brain were seen in the posterior portion of the frontal sinus  No CSF leak was seen  Decision was made to perform a Draf IIB approach  The superior portion of the middle turbinate was resected using a straight through cut forceps  A 70° ignacio bur drill was then used to carry the frontal sinus ostia more anteriorly and to the level of the septum  This was also aided with the Jennife[place]ann mushroom punch and Cobra frontal sinus forceps  This completed the frontal sinusotomy  22 modifier is added due to the complexity of the Draf II approach, added operative time, as well as the erosion of the posterior table of the frontal sinus adding complexity to ensure no CSF leak  All bleeding was controlled with epinephrine pledgets  After time was given, the pledgets were removed  I did fill in some of the space of the frontal sinus with Avitene slurry  PosiSep X was then placed into the middle meatus and hydrated with saline  The patient was then turned over to anesthesia and allowed to awaken without incident  An OG tube was used to suction out the patient's stomach contents before extubation       I was present for the entire procedure    Patient Disposition:  PACU         SIGNATURE: Carmen Gentile MD  DATE: November 30, 2022  TIME: 11:15 AM

## 2022-12-03 LAB
BACTERIA SPEC BFLD CULT: ABNORMAL
GRAM STN SPEC: ABNORMAL
GRAM STN SPEC: ABNORMAL

## 2022-12-05 LAB — FUNGUS SPEC CULT: NORMAL

## 2022-12-12 LAB — FUNGUS SPEC CULT: NORMAL

## 2022-12-19 LAB — FUNGUS SPEC CULT: NORMAL

## 2022-12-27 LAB — FUNGUS SPEC CULT: NORMAL

## 2023-01-03 DIAGNOSIS — J44.9 ASTHMA WITH COPD (CHRONIC OBSTRUCTIVE PULMONARY DISEASE) (HCC): Primary | ICD-10-CM

## 2023-01-03 LAB — FUNGUS SPEC CULT: NORMAL

## 2023-01-04 RX ORDER — BUDESONIDE AND FORMOTEROL FUMARATE DIHYDRATE 160; 4.5 UG/1; UG/1
AEROSOL RESPIRATORY (INHALATION)
Qty: 30.6 G | Refills: 3 | Status: SHIPPED | OUTPATIENT
Start: 2023-01-04

## 2023-03-31 ENCOUNTER — OFFICE VISIT (OUTPATIENT)
Dept: URGENT CARE | Facility: CLINIC | Age: 83
End: 2023-03-31

## 2023-03-31 VITALS
RESPIRATION RATE: 18 BRPM | HEART RATE: 77 BPM | DIASTOLIC BLOOD PRESSURE: 82 MMHG | SYSTOLIC BLOOD PRESSURE: 118 MMHG | TEMPERATURE: 97.9 F | OXYGEN SATURATION: 95 %

## 2023-03-31 DIAGNOSIS — M79.604 RIGHT LEG PAIN: Primary | ICD-10-CM

## 2023-03-31 NOTE — PROGRESS NOTES
[address]        NAME: Jerome Mcleod is a 81 y o  female  : 1940    MRN: [id number]  DATE: 2023  TIME: 10:44 AM      Assessment and Plan     Right leg pain [M79 [id number]]  1  Right leg pain          Note:   I suspect muscle aches from increased activity  Told patient to monitor symptoms and follow up with PCP for any persisting symptoms  Patient Instructions   There are no Patient Instructions on file for this visit  Follow up with PCP in 3-5 days  Go to ER if symptoms worsen  Chief Complaint     Chief Complaint   Patient presents with   • Leg Pain     Patient reported that she woke up this morning with leg pain behind her knee  Patient was worried about a blood clot, and called her PCP  The nurse told her she probably didn't have a blood clot  Patient reported that it went away, and then came back in her ankle  Patient 's friend reported that Wednesday she was walking more thank normal, and Thursday was walking more than normal  Pt has a PMH of Sciatica on the LT side years ago  Patient reported that her pain has resolved currently  History of Present Illness     Patient presents with right leg pain x today  She states it was calf pain, but then ankle pain  She states she has been exercising more and she was also drinking yesterday, but doesn't think she fell  Patient states the pain has now gone away  Review of Systems     Review of Systems   Constitutional: Negative for chills, fatigue and fever  HENT: Negative for congestion, ear pain, postnasal drip, rhinorrhea, sinus pressure, sinus pain and sore throat  Eyes: Negative for pain and visual disturbance  Respiratory: Negative for cough, chest tightness and shortness of breath  Cardiovascular: Negative for chest pain and palpitations  Gastrointestinal: Negative for abdominal pain, diarrhea, nausea and vomiting  Genitourinary: Negative for dysuria and hematuria     Musculoskeletal: Positive for myalgias  Negative for arthralgias and back pain  Skin: Negative for rash  Neurological: Negative for dizziness, seizures, syncope, numbness and headaches  All other systems reviewed and are negative          Current Medications       Current Outpatient Medications:   •  albuterol (ACCUNEB) 0 63 MG/3ML nebulizer solution, Take 3 mL (0 63 mg total) by nebulization every 6 (six) hours as needed for wheezing or shortness of breath, Disp: 1080 mL, Rfl: 3  •  albuterol (PROVENTIL HFA,VENTOLIN HFA) 90 mcg/act inhaler, Inhale 1 puff every 6 (six) hours as needed for wheezing, Disp: 3 Inhaler, Rfl: 5  •  albuterol (PROVENTIL HFA,VENTOLIN HFA) 90 mcg/act inhaler, albuterol sulfate HFA 90 mcg/actuation aerosol inhaler, Disp: , Rfl:   •  famotidine (PEPCID) 40 MG tablet, Take 40 mg by mouth in the morning, Disp: , Rfl:   •  FLUoxetine (PROzac) 40 MG capsule, Take 1 capsule (40 mg total) by mouth 2 (two) times a day, Disp: 60 capsule, Rfl: 0  •  hydrochlorothiazide (HYDRODIURIL) 25 mg tablet, Take 25 mg by mouth in the morning, Disp: , Rfl:   •  levothyroxine 50 mcg tablet, Take 50 mcg by mouth daily, Disp: , Rfl:   •  levothyroxine 75 mcg tablet, , Disp: , Rfl:   •  montelukast (SINGULAIR) 10 mg tablet, Take 10 mg by mouth daily at bedtime, Disp: , Rfl:   •  simvastatin (ZOCOR) 10 mg tablet, Take 10 mg by mouth daily at bedtime, Disp: , Rfl:   •  Symbicort 160-4 5 MCG/ACT inhaler, USE 2 INHALATIONS BY MOUTH  TWICE DAILY RINSE MOUTH  AFTER USE, Disp: 30 6 g, Rfl: 3    Current Allergies     Allergies as of 03/31/2023 - Reviewed 03/31/2023   Allergen Reaction Noted   • Ascorbate - food allergy GI Intolerance 05/07/2009              The following portions of the patient's history were reviewed and updated as appropriate: allergies, current medications, past family history, past medical history, past social history, past surgical history, and problem list      Past Medical History:   Diagnosis Date   • Anxiety    • Arthritis • Asthma    • COPD (chronic obstructive pulmonary disease) ([facility] [address] )    • COVID 2022   • CPAP (continuous positive airway pressure) dependence    • Depression    • Disease of thyroid gland    • GERD (gastroesophageal reflux disease)    • History of transfusion    • Hyperlipidemia    • Hypotension    • MRSA (methicillin resistant Staphylococcus aureus) infection     about 15yrs ago   • Shortness of breath     HUNTER   • Sleep apnea    • Use of cane as ambulatory aid     sometimes   • Wears glasses    • Wrist fracture        Past Surgical History:   Procedure Laterality Date   • BACK SURGERY  2017   •  SECTION      x3   • COLONOSCOPY     • DILATION AND EVACUATION      miscarriage   • EGD     • INCONTINENCE SURGERY     • JOINT REPLACEMENT Left     left knee sugery x2   • NV NSL/SINUS NDSC MAX ANTROST W/RMVL TISS MAX SINUS N/A 2022    Procedure: FUNCTIONAL ENDOSCOPIC SINUS SURGERY (FESS) IMAGED GUIDED LEFT MAXILLARY ANTROSTOMY ETHMOIDECTOMY FRONTAL SINUSOTOMY WITH DRAINAGE OF FRONTAL SINUS MUCOCELE;  Surgeon: Alfonzo Roper MD;  Location: [facility];  Service: ENT   • REPLACEMENT TOTAL KNEE BILATERAL     • SINUS SURGERY     • TUBAL LIGATION         History reviewed  No pertinent family history  Medications have been verified  Objective     /82 (BP Location: Left arm, Patient Position: Sitting, Cuff Size: Large)   Pulse 77   Temp 97 9 °F (36 6 °C) (Tympanic)   Resp 18   SpO2 95%   No LMP recorded  Patient is postmenopausal          Physical Exam     Physical Exam  Vitals and nursing note reviewed  Constitutional:       Appearance: Normal appearance  She is normal weight  She is not ill-appearing  HENT:      Head: Normocephalic and atraumatic  Pulmonary:      Effort: No respiratory distress  Musculoskeletal:         General: No swelling or tenderness  Normal range of motion  Skin:     General: Skin is warm and dry  Neurological:      General: No focal deficit present  Mental Status: She is alert and oriented to person, place, and time     Psychiatric:         Mood and Affect: Mood normal          Behavior: Behavior normal

## 2023-04-11 NOTE — ED PROVIDER NOTES
History  Chief Complaint   Patient presents with    Headache     Pt with headache since about 3am this morning, on antibiotics for a cold      62-year-old female with a past medical history significant for COPD, hyperlipidemia presenting to the emergency department for evaluation of left-sided headache that has been ongoing since 3:00 a m  This morning  Patient does not typically get headaches  She took Tylenol and aspirin at home without relief of her symptoms  She is also reporting cough, shortness of breath, congestion that has been ongoing for the last week  She was prescribed azithromycin which did not relieve her symptoms  She is currently taking 40 mg of prednisone daily which does seem to help her shortness of breath  Patient endorsing chills, denies fever  She is not having any sore throat, abdominal pain, nausea, vomiting, diarrhea, chest pain  No other complaints at this time  History provided by:  Patient   used: No    Headache  Pain location:  L temporal and L parietal  Quality:  Dull  Radiates to:  Does not radiate  Severity currently:  5/10  Onset quality:  Gradual  Duration:  3 hours  Timing:  Constant  Progression:  Unchanged  Chronicity:  New  Similar to prior headaches: no    Relieved by:  Nothing  Worsened by:  Nothing  Ineffective treatments:  Acetaminophen and aspirin  Associated symptoms: congestion, cough and URI    Associated symptoms: no abdominal pain, no blurred vision, no diarrhea, no dizziness, no ear pain, no facial pain, no fever, no focal weakness, no myalgias, no nausea, no neck pain, no neck stiffness, no numbness, no sinus pressure, no sore throat, no swollen glands, no visual change, no vomiting and no weakness        Prior to Admission Medications   Prescriptions Last Dose Informant Patient Reported? Taking?    ARIPiprazole (ABILIFY) 5 mg tablet  Self Yes No   Sig: aripiprazole 5 mg tablet   TAKE 1 TABLET BY MOUTH EVERY DAY   B Complex-Folic Acid (B COMPLEX FORMULA 1) TABS  Self Yes No   Sig: Take by mouth   FLUoxetine (PROzac) 20 mg capsule  Self Yes No   Sig: fluoxetine 20 mg capsule   FLUoxetine (PROzac) 40 MG capsule  Self Yes No   Sig: Take 40 mg by mouth daily   HYDROcodone-acetaminophen (NORCO) 5-325 mg per tablet  Self Yes No   Sig: hydrocodone 5 mg-acetaminophen 325 mg tablet   LORazepam (ATIVAN) 0 5 mg tablet  Self Yes No   LORazepam (ATIVAN) 0 5 mg tablet  Self Yes No   Sig: lorazepam 0 5 mg tablet   Paxlovid tablet therapy pack  Self Yes No   Sig: PLEASE SEE ATTACHED FOR DETAILED DIRECTIONS   Zoster Vaccine Live 68164 UNT/0 65ML SUSR  Self Yes No   albuterol (ACCUNEB) 0 63 MG/3ML nebulizer solution  Self No No   Sig: Take 3 mL (0 63 mg total) by nebulization every 6 (six) hours as needed for wheezing or shortness of breath   albuterol (PROVENTIL HFA,VENTOLIN HFA) 90 mcg/act inhaler  Self No No   Sig: Inhale 1 puff every 6 (six) hours as needed for wheezing   aspirin 81 mg chewable tablet  Self Yes No   Sig: Chew 81 mg daily   azelastine (ASTELIN) 0 1 % nasal spray  Self No No   Si spray into each nostril 2 (two) times a day Use in each nostril as directed   azithromycin (ZITHROMAX) 250 mg tablet   No No   Sig: Take 2 tablets today then 1 tablet daily x 4 days   benzonatate (TESSALON PERLES) 100 mg capsule  Self Yes No   Sig: Take 100 mg by mouth 3 (three) times a day as needed for cough   budesonide-formoterol (SYMBICORT) 160-4 5 mcg/act inhaler  Self Yes No   budesonide-formoterol (Symbicort) 160-4 5 mcg/act inhaler  Self No No   Sig: Inhale 2 puffs 2 (two) times a day Rinse mouth after use     celecoxib (CeleBREX) 200 mg capsule  Self Yes No   Sig: celecoxib 200 mg capsule   ciprofloxacin (CIPRO) 500 mg tablet  Self Yes No   Sig: ciprofloxacin 500 mg tablet   TAKE 1 TABLET BY MOUTH EVERY 12 HOURS FOR 5 DAYS   clotrimazole (MYCELEX) 10 mg james  Self No No   Sig: Take 1 tablet (10 mg total) by mouth 5 (five) times a day   diazepam (VALIUM) 5 mg tablet  Self Yes No   Sig: diazepam 5 mg tablet   doxycycline hyclate (VIBRAMYCIN) 100 mg capsule  Self Yes No   Sig: doxycycline hyclate 100 mg capsule   famotidine (PEPCID) 20 mg tablet  Self No No   Sig: Take 1 tablet (20 mg total) by mouth daily   famotidine (PEPCID) 40 MG tablet  Self Yes No   Sig: famotidine 40 mg tablet   fluticasone (FLONASE) 50 mcg/act nasal spray  Self Yes No   Sig: fluticasone propionate 50 mcg/actuation nasal spray,suspension   SPRAY 1 SPRAY INTO EACH NOSTRIL EVERY DAY   hydrochlorothiazide (HYDRODIURIL) 25 mg tablet  Self Yes No   Sig: Take 25 mg by mouth daily   ibuprofen (MOTRIN) 600 mg tablet  Self Yes No   Sig: ibuprofen 600 mg tablet   ibuprofen (MOTRIN) 800 mg tablet  Self No No   Sig: Take 1 tablet (800 mg total) by mouth 3 (three) times a day   ipratropium (ATROVENT) 0 06 % nasal spray  Self Yes No   Sig: ipratropium bromide 42 mcg (0 06 %) nasal spray   levothyroxine 50 mcg tablet  Self Yes No   Sig: Take 50 mcg by mouth daily   levothyroxine 50 mcg tablet  Self Yes No   Sig: levothyroxine 50 mcg tablet   meloxicam (MOBIC) 15 mg tablet  Self Yes No   Sig: meloxicam 15 mg tablet   montelukast (SINGULAIR) 10 mg tablet  Self No No   Sig: Take 1 tablet (10 mg total) by mouth daily at bedtime   montelukast (SINGULAIR) 10 mg tablet  Self Yes No   Sig: montelukast 10 mg tablet   pantoprazole (PROTONIX) 40 mg tablet  Self Yes No   Sig: Take 40 mg by mouth daily   predniSONE 20 mg tablet   No No   Sig: Take 2 tablets (40 mg total) by mouth daily for 5 days   simvastatin (ZOCOR) 10 mg tablet  Self Yes No   Sig: Take 10 mg by mouth daily at bedtime   sodium chloride (OCEAN) 0 65 % nasal spray  Self No No   Si spray into each nostril as needed for congestion or rhinitis   sucralfate (CARAFATE) 1 g tablet  Self No No   Sig: Take 1 tablet (1 g total) by mouth 4 (four) times a day Before meals and before bed as needed for reflux      Facility-Administered Medications: None       Past Medical History:   Diagnosis Date    Anxiety     COPD [facility]chronic obstructive pulmonary disease) ([facility] [address] )     Depression     Disease of thyroid gland     Hyperlipidemia     Hypotension     Wrist fracture        Past Surgical History:   Procedure Laterality Date    BACK SURGERY  2017    INCONTINENCE SURGERY      NEPHRECTOMY TRANSPLANTED ORGAN      REPLACEMENT TOTAL KNEE BILATERAL         History reviewed  No pertinent family history  I have reviewed and agree with the history as documented  E-Cigarette/Vaping    E-Cigarette Use Never User      E-Cigarette/Vaping Substances     Social History     Tobacco Use    Smoking status: Former Smoker     Packs/day: 0 25     Years: 30 00     Pack years: 7 50     Start date: 1970     Quit date: 2000     Years since quittin 7    Smokeless tobacco: Never Used   Vaping Use    Vaping Use: Never used   Substance Use Topics    Alcohol use: No    Drug use: No       Review of Systems   Constitutional: Negative for fever  HENT: Positive for congestion  Negative for ear pain, sinus pressure and sore throat  Eyes: Negative for blurred vision  Respiratory: Positive for cough  Gastrointestinal: Negative for abdominal pain, diarrhea, nausea and vomiting  Musculoskeletal: Negative for myalgias, neck pain and neck stiffness  Neurological: Positive for headaches  Negative for dizziness, focal weakness, weakness and numbness  All other systems reviewed and are negative  Physical Exam  Physical Exam  Vitals reviewed  Constitutional:       General: She is not in acute distress  Appearance: Normal appearance  She is obese  She is not ill-appearing, toxic-appearing or diaphoretic  HENT:      Head: Normocephalic and atraumatic  Right Ear: External ear normal       Left Ear: External ear normal       Nose: Nose normal  No congestion or rhinorrhea  Mouth/Throat:      Mouth: Mucous membranes are moist       Pharynx: Oropharynx is clear   No oropharyngeal exudate or posterior oropharyngeal erythema  Eyes:      General: No visual field deficit or scleral icterus  Right eye: No discharge  Left eye: No discharge  Extraocular Movements: Extraocular movements intact  Conjunctiva/sclera: Conjunctivae normal    Cardiovascular:      Rate and Rhythm: Normal rate and regular rhythm  Pulses: Normal pulses  Heart sounds: Normal heart sounds  No murmur heard  No friction rub  No gallop  Pulmonary:      Effort: Pulmonary effort is normal  No respiratory distress  Breath sounds: No stridor  Wheezing (mild, end expiratory, scattered) present  No rhonchi or rales  Abdominal:      General: Abdomen is flat  Palpations: Abdomen is soft  Tenderness: There is no abdominal tenderness  There is no guarding or rebound  Musculoskeletal:      Cervical back: Normal range of motion and neck supple  Right lower leg: No edema  Left lower leg: No edema  Skin:     General: Skin is warm and dry  Capillary Refill: Capillary refill takes less than 2 seconds  Neurological:      General: No focal deficit present  Mental Status: She is alert and oriented to person, place, and time  GCS: GCS eye subscore is 4  GCS verbal subscore is 5  GCS motor subscore is 6  Cranial Nerves: Cranial nerves are intact  No cranial nerve deficit, dysarthria or facial asymmetry  Sensory: Sensation is intact  No sensory deficit  Motor: Motor function is intact  No weakness, tremor, seizure activity or pronator drift  Coordination: Coordination is intact   Coordination normal  Finger-Nose-Finger Test and Heel to [facility] Test normal  Rapid alternating movements normal    Psychiatric:         Mood and Affect: Mood normal          Behavior: Behavior normal          Vital Signs  ED Triage Vitals   Temperature Pulse Respirations Blood Pressure SpO2   10/04/22 0531 10/04/22 0531 10/04/22 0531 10/04/22 0531 10/04/22 0531   98 °F (36 7 °C) 84 16 (!) 178/86 93 %      Temp src Heart Rate Source Patient Position - Orthostatic VS BP Location FiO2 (%)   -- 10/04/22 0741 10/04/22 0741 10/04/22 0741 --    Monitor Lying Left arm       Pain Score       10/04/22 0610       6           Vitals:    10/04/22 0531 10/04/22 0741   BP: (!) 178/86 153/69   Pulse: 84 81   Patient Position - Orthostatic VS:  Lying         Visual Acuity  Visual Acuity    Flowsheet Row Most Recent Value   L Pupil Size (mm) 3   R Pupil Size (mm) 3          ED Medications  Medications   ARIPiprazole (ABILIFY) tablet 5 mg (has no administration in time range)   aspirin chewable tablet 81 mg (has no administration in time range)   benzonatate (TESSALON PERLES) capsule 100 mg (has no administration in time range)   budesonide-formoterol (SYMBICORT) 160-4 5 mcg/act inhaler 2 puff (has no administration in time range)   famotidine (PEPCID) tablet 20 mg (has no administration in time range)   hydrochlorothiazide (HYDRODIURIL) tablet 25 mg (has no administration in time range)   levothyroxine tablet 50 mcg (has no administration in time range)   LORazepam (ATIVAN) tablet 0 5 mg (has no administration in time range)   pravastatin (PRAVACHOL) tablet 20 mg (has no administration in time range)   sucralfate (CARAFATE) tablet 1 g (has no administration in time range)   acetaminophen (TYLENOL) tablet 650 mg (has no administration in time range)   ketorolac (TORADOL) injection 15 mg (15 mg Intramuscular Given 10/4/22 0610)   albuterol (PROVENTIL HFA,VENTOLIN HFA) inhaler 2 puff (2 puffs Inhalation Given 10/4/22 0610)   methylPREDNISolone sodium succinate (Solu-MEDROL) injection 125 mg (125 mg Intravenous Given 10/4/22 0753)       Diagnostic Studies  Results Reviewed     Procedure Component Value Units Date/Time    Comprehensive metabolic panel [[id number]] Collected: 10/04/22 0751    Lab Status:  In process Specimen: Blood from Arm, Right Updated: 10/04/22 0758    CBC and differential [[id number]] Collected: 10/04/22 0751    Lab Status: In process Specimen: Blood from Arm, Right Updated: 10/04/22 0758    FLU/RSV/COVID - if FLU/RSV clinically relevant [[id number]]  (Normal) Collected: 10/04/22 0609    Lab Status: Final result Specimen: Nares from Nose Updated: 10/04/22 0707     SARS-CoV-2 Negative     INFLUENZA A PCR Negative     INFLUENZA B PCR Negative     RSV PCR Negative    Narrative:      FOR PEDIATRIC PATIENTS - copy/paste COVID Guidelines URL to browser: [email or web address]  [email or web address]    SARS-CoV-2 assay is a Nucleic Acid Amplification assay intended for the  qualitative detection of nucleic acid from SARS-CoV-2 in nasopharyngeal  swabs  Results are for the presumptive identification of SARS-CoV-2 RNA  Positive results are indicative of infection with SARS-CoV-2, the virus  causing COVID-19, but do not rule out bacterial infection or co-infection  with other viruses  Laboratories within the [facility] and its  territories are required to report all positive results to the appropriate  public health authorities  Negative results do not preclude SARS-CoV-2  infection and should not be used as the sole basis for treatment or other  patient management decisions  Negative results must be combined with  clinical observations, patient history, and epidemiological information  This test has not been FDA cleared or approved  This test has been authorized by FDA under an Emergency Use Authorization  (EUA)  This test is only authorized for the duration of time the  declaration that circumstances exist justifying the authorization of the  emergency use of an in vitro diagnostic tests for detection of SARS-CoV-2  virus and/or diagnosis of COVID-19 infection under section 564(b)(1) of  the Act, 21 U  S C  202LFB-0(P)(1), unless the authorization is terminated  or revoked sooner   The test has been validated but independent review by FDA  and CLIA is pending  Test performed using [facility] GeneXpert: This RT-PCR assay targets N2,  a region unique to SARS-CoV-2  A conserved region in the E-gene was chosen  for pan-Sarbecovirus detection which includes SARS-CoV-2  According to CMS-2020-01-R, this platform meets the definition of high-throughput technology  CT head without contrast   Final Result by Natalio Kelly MD (10/04 0311)      No evidence of acute intracranial process  Chronic microangiopathy  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 of the posterior wall, left greater than right  There appears to be slightly increased posterior extension of the left frontal opacity compared to February 2019  Follow-up with    MRI with and without contrast and appropriate clinical consultation is advised  This study demonstrates a significant  finding and was documented as such in [facility] for liaison and referring practitioner notification  Workstation performed: [id number]         XR chest 1 view portable   Final Result by Tito Petit MD (10/04 0715)      No acute cardiopulmonary disease  Workstation performed: [id number]                    Procedures  Procedures         ED Course  ED Course as of 10/04/22 0758   Tue Oct 04, 2022   0424 Spoke with ENT regarding abnormal CT findings, recommend admission for inpatient MRI and ENT consultation  Will admit to medicine in stable condition  SBIRT 22yo+    Flowsheet Row Most Recent Value   SBIRT (23 yo +)    In order to provide better care to our patients, we are screening all of our patients for alcohol and drug use  Would it be okay to ask you these screening questions? Yes Filed at: 10/04/2022 6167   Initial Alcohol Screen: US AUDIT-C     1  How often do you have a drink containing alcohol? 0 Filed at: 10/04/2022 0722   2   How many drinks containing alcohol do you have on a typical day you are drinking? 0 Filed at: 10/04/2022 0722   3b  FEMALE Any Age, or MALE 65+: How often do you have 4 or more drinks on one occassion? 0 Filed at: 10/04/2022 5396   Audit-C Score 0 Filed at: 10/04/2022 8271   LIDIA: How many times in the past year have you    Used an illegal drug or used a prescription medication for non-medical reasons? Never Filed at: 10/04/2022 4255                    [facility]  Number of Diagnoses or Management Options  Acute nonintractable headache, unspecified headache type  Diagnosis management comments: Patient presenting for evaluation of acute left-sided headache  Patient does not typically get headaches  Upon arrival, she appears comfortable  She is not any acute distress  Vital signs are unremarkable  She is also having a mild COPD exacerbation, currently being treated outpatient with prednisone and albuterol  She does have mild end-expiratory wheezes on exam   No neurologic deficits  Toradol was given without relief of headache  CT head reveals possible dehiscence of the posterior wall of the left frontal sinus  I spoke with ENT who recommends inpatient admission for MRI  Patient admitted to medicine for further evaluation and management  Currently in stable condition         Amount and/or Complexity of Data Reviewed  Tests in the radiology section of CPT®: ordered and reviewed  Discuss the patient with other providers: yes    Patient Progress  Patient progress: stable      Disposition  Final diagnoses:   Acute nonintractable headache, unspecified headache type     Time reflects when diagnosis was documented in both MDM as applicable and the Disposition within this note     Time User Action Codes Description Comment    10/4/2022  7:38 AM Sarahy Franco Add [R51 9] Acute nonintractable headache, unspecified headache type     10/4/2022  7:46 AM Shirlene Yin Add [J32 1] Frontal sinusitis       ED Disposition     ED Disposition   Admit    Condition   Stable    Date/Time   Tue Oct 4, 2022  7:38 AM    Comment   Case was discussed with DANIELA and the patient's admission status was agreed to be Admission Status: observation status to the service of Dr Norma Garcia   Follow-up Information    None         Patient's Medications   Discharge Prescriptions    No medications on file       No discharge procedures on file      PDMP Review     None          ED Provider  Electronically Signed by           Hood Hill PA-C  10/04/22 5894 [place]ac Pregnancy And Lactation Text: This medication is not safe during pregnancy. It is unknown if this medication is excreted in breast milk.

## 2023-04-20 LAB — EXTERNAL EGFR: 74

## 2023-04-21 DIAGNOSIS — J40 BRONCHITIS: Primary | ICD-10-CM

## 2023-04-21 RX ORDER — PREDNISONE 20 MG/1
TABLET ORAL
Qty: 18 TABLET | Refills: 0 | Status: SHIPPED | OUTPATIENT
Start: 2023-04-21 | End: 2023-05-10 | Stop reason: ALTCHOICE

## 2023-04-21 RX ORDER — AZITHROMYCIN 250 MG/1
TABLET, FILM COATED ORAL
Qty: 6 TABLET | Refills: 0 | Status: SHIPPED | OUTPATIENT
Start: 2023-04-21 | End: 2023-04-25

## 2023-05-10 ENCOUNTER — OFFICE VISIT (OUTPATIENT)
Dept: FAMILY MEDICINE CLINIC | Facility: CLINIC | Age: 83
End: 2023-05-10

## 2023-05-10 VITALS
WEIGHT: 179 LBS | SYSTOLIC BLOOD PRESSURE: 126 MMHG | OXYGEN SATURATION: 94 % | TEMPERATURE: 98.1 F | BODY MASS INDEX: 33.79 KG/M2 | HEIGHT: 61 IN | DIASTOLIC BLOOD PRESSURE: 74 MMHG | HEART RATE: 70 BPM

## 2023-05-10 DIAGNOSIS — K21.9 GASTROESOPHAGEAL REFLUX DISEASE WITHOUT ESOPHAGITIS: ICD-10-CM

## 2023-05-10 DIAGNOSIS — J44.9 CHRONIC OBSTRUCTIVE PULMONARY DISEASE, UNSPECIFIED COPD TYPE (HCC): Primary | ICD-10-CM

## 2023-05-10 DIAGNOSIS — J45.20 MILD INTERMITTENT ASTHMA, UNSPECIFIED WHETHER COMPLICATED: ICD-10-CM

## 2023-05-10 DIAGNOSIS — I10 ESSENTIAL HYPERTENSION: ICD-10-CM

## 2023-05-10 DIAGNOSIS — E78.49 OTHER HYPERLIPIDEMIA: ICD-10-CM

## 2023-05-10 DIAGNOSIS — J44.9 ASTHMA WITH COPD (HCC): ICD-10-CM

## 2023-05-10 DIAGNOSIS — Z76.89 ENCOUNTER TO ESTABLISH CARE: ICD-10-CM

## 2023-05-10 DIAGNOSIS — E03.9 ACQUIRED HYPOTHYROIDISM: ICD-10-CM

## 2023-05-10 DIAGNOSIS — F41.8 DEPRESSION WITH ANXIETY: ICD-10-CM

## 2023-05-10 PROBLEM — S69.92XA LEFT WRIST INJURY, INITIAL ENCOUNTER: Status: RESOLVED | Noted: 2019-02-05 | Resolved: 2023-05-10

## 2023-05-10 PROBLEM — R51.9 HEADACHE: Status: RESOLVED | Noted: 2022-10-04 | Resolved: 2023-05-10

## 2023-05-10 PROBLEM — E87.6 HYPOKALEMIA: Status: RESOLVED | Noted: 2019-03-19 | Resolved: 2023-05-10

## 2023-05-10 PROBLEM — E66.01 OBESITY, MORBID (HCC): Status: RESOLVED | Noted: 2021-10-25 | Resolved: 2023-05-10

## 2023-05-10 PROBLEM — G47.33 OSA (OBSTRUCTIVE SLEEP APNEA): Status: ACTIVE | Noted: 2021-04-29

## 2023-05-10 RX ORDER — ASPIRIN 81 MG/1
81 TABLET, CHEWABLE ORAL DAILY
COMMUNITY

## 2023-05-10 RX ORDER — ALBUTEROL SULFATE 0.63 MG/3ML
0.63 SOLUTION RESPIRATORY (INHALATION) EVERY 6 HOURS PRN
Qty: 1080 ML | Refills: 3 | Status: SHIPPED | OUTPATIENT
Start: 2023-05-10

## 2023-05-10 NOTE — PROGRESS NOTES
Name: Yash Perkins      : 1940      MRN: [id number]  Encounter Provider: Derek Mcdaniel DO  Encounter Date: 5/10/2023   Encounter department: [address] [place] [address]     1  Chronic obstructive pulmonary disease, unspecified COPD type ([facility] )  Assessment & Plan:  Continue Symbicort and prn albuterol      2  Depression with anxiety  Assessment & Plan:  Continue Prozac 40mg BID      3  Other hyperlipidemia  Assessment & Plan:  Recent Lipid Panel wnl, Continue Zocor 10mg Daily      4  Essential hypertension  Assessment & Plan:  BP Stable continue HCTZ 25mg daily      5  Mild intermittent asthma, unspecified whether complicated  Assessment & Plan:  Albuterol nebulizer, Singulair  and rescue inhaler prn       6  Acquired hypothyroidism  Assessment & Plan:  Recent TSH wnl, continue Levothyroxine 50mcg daily      7  Gastroesophageal reflux disease without esophagitis  Assessment & Plan:  Continue pepcid 40mg daily      8  Encounter to establish care    9  Asthma with COPD ([facility] )  -     albuterol (ACCUNEB) 0 63 MG/3ML nebulizer solution; Take 3 mL (0 63 mg total) by nebulization every 6 (six) hours as needed for wheezing or shortness of breath    BMI Counseling: Body mass index is 33 82 kg/m²  The BMI is above normal  Nutrition recommendations include moderation in carbohydrate intake and reducing intake of cholesterol  Exercise recommendations include strength training exercises  Rationale for BMI follow-up plan is due to patient being overweight or obese  Subjective      Patient is a 82y/o F with pmhx of HLD, CHUYITA, Asthma, COPD, Hypothyroidism and GERD  Patient presents today to establish care  Patient has no specific complainst or concerns today would just like to transfer her care to [address] [place] [address]  Patient is relatively very active and independent   She still drives, lives at home alone and takes care of all her ADL's       Review of Systems   Constitutional: Negative for chills and fever  HENT: Negative for congestion and rhinorrhea  Respiratory: Negative for cough and shortness of breath  Cardiovascular: Negative for chest pain and palpitations  Gastrointestinal: Negative for abdominal pain, constipation, diarrhea, nausea and vomiting  Neurological: Negative for dizziness and headaches  Some intermittent balance issues       Current Outpatient Medications on File Prior to Visit   Medication Sig   • albuterol (PROVENTIL HFA,VENTOLIN HFA) 90 mcg/act inhaler Inhale 1 puff every 6 (six) hours as needed for wheezing   • aspirin 81 mg chewable tablet Chew 81 mg daily At Night   • famotidine (PEPCID) 40 MG tablet Take 40 mg by mouth in the morning   • FLUoxetine (PROzac) 40 MG capsule Take 1 capsule (40 mg total) by mouth 2 (two) times a day   • hydrochlorothiazide (HYDRODIURIL) 25 mg tablet Take 25 mg by mouth in the morning   • levothyroxine 50 mcg tablet Take 50 mcg by mouth daily   • montelukast (SINGULAIR) 10 mg tablet Take 10 mg by mouth daily at bedtime   • simvastatin (ZOCOR) 10 mg tablet Take 10 mg by mouth daily at bedtime   • Symbicort 160-4 5 MCG/ACT inhaler USE 2 INHALATIONS BY MOUTH  TWICE DAILY RINSE MOUTH  AFTER USE   • [DISCONTINUED] albuterol (ACCUNEB) 0 63 MG/3ML nebulizer solution Take 3 mL (0 63 mg total) by nebulization every 6 (six) hours as needed for wheezing or shortness of breath   • [DISCONTINUED] albuterol (PROVENTIL HFA,VENTOLIN HFA) 90 mcg/act inhaler albuterol sulfate HFA 90 mcg/actuation aerosol inhaler   • [DISCONTINUED] levothyroxine 75 mcg tablet  (Patient not taking: Reported on 5/10/2023)   • [DISCONTINUED] predniSONE 20 mg tablet Use 2 tablets daily for 5 days 1 tablet daily for 5 days and half a tablet daily for 5 days   (Patient not taking: Reported on 5/10/2023)       Objective     /74 (BP Location: Right arm, Patient Position: Sitting, Cuff Size: Large)   Pulse 70   Temp 98 1 °F (36 7 °C) (Temporal)   Ht 5' "1\" (1 549 m)   Wt 81 2 kg (179 lb)   SpO2 94%   BMI 33 82 kg/m²     Physical Exam  Vitals reviewed  Constitutional:       Appearance: She is obese  HENT:      Head: Normocephalic and atraumatic  Mouth/Throat:      Mouth: Mucous membranes are moist       Pharynx: No oropharyngeal exudate or posterior oropharyngeal erythema  Eyes:      Extraocular Movements: Extraocular movements intact  Cardiovascular:      Rate and Rhythm: Normal rate and regular rhythm  Heart sounds: Normal heart sounds  Pulmonary:      Effort: Pulmonary effort is normal       Breath sounds: Normal breath sounds  Abdominal:      Palpations: Abdomen is soft  Tenderness: There is no abdominal tenderness  Neurological:      General: No focal deficit present  Mental Status: She is alert and oriented to person, place, and time     Psychiatric:         Mood and Affect: Mood normal          Behavior: Behavior normal        Lyn Mullins DO  "

## 2023-05-11 ENCOUNTER — TELEPHONE (OUTPATIENT)
Dept: ADMINISTRATIVE | Facility: OTHER | Age: 83
End: 2023-05-11

## 2023-05-11 ENCOUNTER — APPOINTMENT (OUTPATIENT)
Dept: LAB | Facility: HOSPITAL | Age: 83
End: 2023-05-11

## 2023-05-11 ENCOUNTER — TELEPHONE (OUTPATIENT)
Dept: FAMILY MEDICINE CLINIC | Facility: CLINIC | Age: 83
End: 2023-05-11

## 2023-05-11 ENCOUNTER — OFFICE VISIT (OUTPATIENT)
Dept: FAMILY MEDICINE CLINIC | Facility: CLINIC | Age: 83
End: 2023-05-11

## 2023-05-11 VITALS
BODY MASS INDEX: 33.79 KG/M2 | OXYGEN SATURATION: 97 % | HEART RATE: 81 BPM | DIASTOLIC BLOOD PRESSURE: 70 MMHG | SYSTOLIC BLOOD PRESSURE: 106 MMHG | HEIGHT: 61 IN | TEMPERATURE: 97.5 F | WEIGHT: 179 LBS

## 2023-05-11 DIAGNOSIS — R10.13 EPIGASTRIC PAIN: Primary | ICD-10-CM

## 2023-05-11 DIAGNOSIS — K30 INDIGESTION: Primary | ICD-10-CM

## 2023-05-11 DIAGNOSIS — R10.13 EPIGASTRIC PAIN: ICD-10-CM

## 2023-05-11 LAB — CARDIAC TROPONIN I PNL SERPL HS: 2 NG/L (ref 8–18)

## 2023-05-11 RX ORDER — PANTOPRAZOLE SODIUM 40 MG/1
40 TABLET, DELAYED RELEASE ORAL DAILY
Qty: 30 TABLET | Refills: 1 | Status: SHIPPED | OUTPATIENT
Start: 2023-05-11

## 2023-05-11 NOTE — ASSESSMENT & PLAN NOTE
EKG in office showing NSR, HR 74, normal axis and R wave progression, no ST-T wave abnormalities  Send patient for stat troponin as she does report tight bandlike sensation around chest and a pressing sensation at epigastric area    However she does have some mild improvement in symptoms with belching ,  I will follow-up pending results

## 2023-05-11 NOTE — TELEPHONE ENCOUNTER
Upon review of the In Basket request we were able to locate, review, and update the patient chart as requested for DEXA Scan, eGFR and Medicare AWV  Any additional questions or concerns should be emailed to the Practice Liaisons via the appropriate education email address, please do not reply via In Basket      Thank you  Danni Diaz

## 2023-05-11 NOTE — TELEPHONE ENCOUNTER
Left a detailed message on machine informing patient that [facility] cannot dispense a neb filter to patients that do not have a nebulizer through them  We will send over a new order to [address] for the nebulizer and attachments

## 2023-05-11 NOTE — TELEPHONE ENCOUNTER
----- Message from Tom Harrison MA sent at 5/10/2023  2:22 PM EDT -----  Regarding: AWV, DXA, GFR,  05/10/23 2:22 PM    Hello, our patient attached above has had DEXA Scan, Glomerular Filtration Rate (GFR), and Medicare AWV completed/performed  Please assist in updating the patient chart by pulling the Care Everywhere (CE) document  The date of service is 12/13/2022, 5/3/2021, 4/20/2023       Thank you,  Fide Calhoun   [facility]

## 2023-05-23 ENCOUNTER — OFFICE VISIT (OUTPATIENT)
Age: 83
End: 2023-05-23

## 2023-05-23 VITALS
WEIGHT: 175 LBS | OXYGEN SATURATION: 93 % | RESPIRATION RATE: 18 BRPM | DIASTOLIC BLOOD PRESSURE: 71 MMHG | HEIGHT: 61 IN | BODY MASS INDEX: 33.04 KG/M2 | TEMPERATURE: 98.2 F | SYSTOLIC BLOOD PRESSURE: 122 MMHG | HEART RATE: 83 BPM

## 2023-05-23 DIAGNOSIS — J44.9 ASTHMA WITH COPD (HCC): Primary | ICD-10-CM

## 2023-05-23 DIAGNOSIS — G47.33 OSA (OBSTRUCTIVE SLEEP APNEA): ICD-10-CM

## 2023-05-23 NOTE — PROGRESS NOTES
"Assessment/Plan:   Diagnoses and all orders for this visit:    Asthma with COPD ([facility] [address] )    CHUYITA (obstructive sleep apnea)    Patient is here today for follow up  She is overall stable with her breathing  She did have an exacerbation in April and is back to her baseline  She continues with her Symbicort BID, occasionally using her albuterol  She continues with her CPAP but is not always compliant with it  She will follow up with us in 6 months or sooner if necessary  Return in about 6 months (around 11/23/2023)  All questions are answered to the patient's satisfaction and understanding  She verbalizes understanding  She is encouraged to call with any further questions or concerns  Portions of the record may have been created with voice recognition software  Occasional wrong word or \"sound a like\" substitutions may have occurred due to the inherent limitations of voice recognition software  Read the chart carefully and recognize, using context, where substitutions have occurred  Electronically Signed by Melissa Nielsen PA-C    ______________________________________________________________________    Chief Complaint:   Chief Complaint   Patient presents with   • Follow-up       Patient ID: Sarah Castano is a 81 y o  y o  female has a past medical history of Anxiety, Arthritis, Asthma, [facility] ([facility]ronic obstructive pulmonary disease) [facility] COVID (06/2022), CPAP (continuous positive airway pressure) dependence, Depression, Disease of thyroid gland, GERD (gastroesophageal reflux disease), History of transfusion, Hyperlipidemia, Hypotension, MRSA (methicillin resistant Staphylococcus aureus) infection, Shortness of breath, Sleep apnea, Use of cane as ambulatory aid, Wears glasses, and Wrist fracture  5/23/2023  Patient presents today for follow-up visit    Patient is an 67-year-old female former smoker who quit over 20 years ago with past medical history of asthma, COPD, seasonal environmental allergies, " hiatal hernia, GERD, hypertension, obesity, CHUYITA  She is here today for follow up  She did have an exacerbation in April and completed prednisone  She is back to her baseline respiratory status  She continues with the Symbicort twice per day, albuterol occasionally  Review of Systems   Constitutional: Negative  HENT: Negative  Respiratory: Positive for shortness of breath  Cardiovascular: Negative  Gastrointestinal: Negative  Genitourinary: Negative  Musculoskeletal: Negative  Skin: Negative  Allergic/Immunologic: Positive for environmental allergies  Neurological: Negative  Psychiatric/Behavioral: Negative  Smoking history: She reports that she quit smoking about 23 years ago  Her smoking use included cigarettes  She started smoking about 53 years ago  She has a 7 50 pack-year smoking history  She has never been exposed to tobacco smoke  She has never used smokeless tobacco     The following portions of the patient's history were reviewed and updated as appropriate: allergies, current medications, past family history, past medical history, past social history, past surgical history and problem list     Immunization History   Administered Date(s) Administered   • COVID-19 MODERNA VACC 0 5 ML IM 02/08/2021, 03/09/2021, 10/24/2021, 04/08/2022   • COVID-19 Pfizer Vac BIVALENT Edward-sucrose 12 Yr+ IM (BOOSTER ONLY) 12/13/2022   • INFLUENZA 10/24/2016, 08/16/2018   • Influenza Split High Dose Preservative Free IM 09/12/2012, 09/13/2017, 08/16/2018, 08/27/2020   • Influenza, Seasonal Vaccine, Quadrivalent, Adjuvanted,   5e 10/18/2022   • Influenza, high dose seasonal 0 7 mL 08/27/2020   • Influenza, seasonal, injectable 1940   • Pneumococcal Conjugate 13-Valent 03/23/2016   • Pneumococcal Polysaccharide PPV23 1940, 01/01/2014   • Tdap [birth year], 09/26/2016   • Zoster [birth year]   • Zoster Vaccine Recombinant 08/16/2018, 01/25/2019   • "influenza, trivalent, adjuvanted 09/13/2019     Current Outpatient Medications   Medication Sig Dispense Refill   • albuterol (ACCUNEB) 0 63 MG/3ML nebulizer solution Take 3 mL (0 63 mg total) by nebulization every 6 (six) hours as needed for wheezing or shortness of breath 1080 mL 3   • albuterol (PROVENTIL HFA,VENTOLIN HFA) 90 mcg/act inhaler Inhale 1 puff every 6 (six) hours as needed for wheezing 3 Inhaler 5   • aspirin 81 mg chewable tablet Chew 81 mg daily At Night     • FLUoxetine (PROzac) 40 MG capsule Take 1 capsule (40 mg total) by mouth 2 (two) times a day 60 capsule 0   • hydrochlorothiazide (HYDRODIURIL) 25 mg tablet Take 25 mg by mouth in the morning     • levothyroxine 50 mcg tablet Take 50 mcg by mouth daily     • montelukast (SINGULAIR) 10 mg tablet Take 10 mg by mouth daily at bedtime     • pantoprazole (PROTONIX) 40 mg tablet Take 1 tablet (40 mg total) by mouth daily 30 tablet 1   • simvastatin (ZOCOR) 10 mg tablet Take 10 mg by mouth daily at bedtime     • Symbicort 160-4 5 MCG/ACT inhaler USE 2 INHALATIONS BY MOUTH  TWICE DAILY RINSE MOUTH  AFTER USE 30 6 g 3     No current facility-administered medications for this visit  Allergies: Ascorbate - food allergy    Objective:  Vitals:    05/23/23 1320 05/23/23 1323   BP: 122/71    BP Location: Left arm    Patient Position: Sitting    Cuff Size: Large    Pulse: 83    Resp: 18    Temp: 98 2 °F (36 8 °C)    SpO2: 93% 93%   Weight: 79 4 kg (175 lb)    Height: 5' 1\" (1 549 m)    Oxygen Therapy  SpO2: 93 %  Oxygen Therapy: None (Room air)    Wt Readings from Last 3 Encounters:   05/23/23 79 4 kg (175 lb)   05/11/23 81 2 kg (179 lb)   05/10/23 81 2 kg (179 lb)     Body mass index is 33 07 kg/m²  Physical Exam  Vitals reviewed  Constitutional:       General: She is not in acute distress  Appearance: Normal appearance  She is not ill-appearing  HENT:      Head: Normocephalic and atraumatic        Mouth/Throat:      Pharynx: Oropharynx is " clear    Eyes:      Conjunctiva/sclera: Conjunctivae normal    Cardiovascular:      Rate and Rhythm: Normal rate and regular rhythm  Pulmonary:      Effort: Pulmonary effort is normal  No respiratory distress  Breath sounds: Normal breath sounds  No decreased breath sounds, wheezing, rhonchi or rales  Abdominal:      General: Abdomen is flat  Musculoskeletal:         General: Normal range of motion  Cervical back: Normal range of motion  Right lower leg: No edema  Left lower leg: No edema  Skin:     General: Skin is warm and dry  Neurological:      Mental Status: She is alert and oriented to person, place, and time  Psychiatric:         Mood and Affect: Mood normal          Behavior: Behavior normal          Lab Review:   Lab Results   Component Value Date     05/19/2015    K 3 9 11/07/2022    K 4 3 05/19/2015     11/07/2022     05/19/2015    CO2 27 11/07/2022    CO2 29 8 05/19/2015    BUN 19 11/07/2022    BUN 19 05/19/2015    CREATININE 0 72 11/07/2022    CREATININE 0 7 05/19/2015    GLUCOSE 107 (H) 05/19/2015    CALCIUM 9 2 11/07/2022    CALCIUM 9 1 05/19/2015     Lab Results   Component Value Date    WBC 5 96 11/07/2022    WBC 4 7 05/19/2015    HGB 14 4 11/07/2022    HGB 15 2 05/19/2015    HCT 43 0 11/07/2022    HCT 44 8 05/19/2015    MCV 89 11/07/2022    MCV 89 05/19/2015     11/07/2022     05/19/2015       Diagnostics:    none pertinent  Office Spirometry Results:     ESS:    No results found

## 2023-06-06 ENCOUNTER — TELEPHONE (OUTPATIENT)
Dept: PULMONOLOGY | Facility: CLINIC | Age: 83
End: 2023-06-06

## 2023-06-06 DIAGNOSIS — G47.33 OSA ON CPAP: Primary | ICD-10-CM

## 2023-06-06 DIAGNOSIS — Z99.89 OSA ON CPAP: Primary | ICD-10-CM

## 2023-06-06 NOTE — TELEPHONE ENCOUNTER
Patient left a message on our med refill line requesting a new RX for Cpap supplies to be sent to [facility]

## 2023-06-08 LAB

## 2023-06-09 ENCOUNTER — TELEPHONE (OUTPATIENT)
Dept: FAMILY MEDICINE CLINIC | Facility: CLINIC | Age: 83
End: 2023-06-09

## 2023-06-12 ENCOUNTER — OFFICE VISIT (OUTPATIENT)
Dept: FAMILY MEDICINE CLINIC | Facility: CLINIC | Age: 83
End: 2023-06-12
Payer: MEDICARE

## 2023-06-12 VITALS
WEIGHT: 177 LBS | RESPIRATION RATE: 16 BRPM | SYSTOLIC BLOOD PRESSURE: 114 MMHG | DIASTOLIC BLOOD PRESSURE: 66 MMHG | HEART RATE: 100 BPM | OXYGEN SATURATION: 98 % | HEIGHT: 61 IN | TEMPERATURE: 98.6 F | BODY MASS INDEX: 33.42 KG/M2

## 2023-06-12 DIAGNOSIS — K30 INDIGESTION: ICD-10-CM

## 2023-06-12 DIAGNOSIS — F41.8 DEPRESSION WITH ANXIETY: Primary | ICD-10-CM

## 2023-06-12 PROCEDURE — 99213 OFFICE O/P EST LOW 20 MIN: CPT | Performed by: STUDENT IN AN ORGANIZED HEALTH CARE EDUCATION/TRAINING PROGRAM

## 2023-06-12 RX ORDER — PANTOPRAZOLE SODIUM 40 MG/1
40 TABLET, DELAYED RELEASE ORAL DAILY
Qty: 30 TABLET | Refills: 1 | Status: SHIPPED | OUTPATIENT
Start: 2023-06-12

## 2023-06-12 NOTE — PROGRESS NOTES
Name: Doug Fang      : 1940      MRN: [id number]  Encounter Provider: Sundar Smith DO  Encounter Date: 2023   Encounter department: [address] [place] [address]  Depression with anxiety  Assessment & Plan:  No changes to Prozac at this time  Will continue at 40mg daily      2  Indigestion  -     pantoprazole (PROTONIX) 40 mg tablet; Take 1 tablet (40 mg total) by mouth daily         Subjective      Patient presents today to discuss possible changes to her depression medication  States that she has a specific friend that gets under her skin  States that she is unsure if her medication needs adjusting or she should end friendship with her friend  Patient states that she has been a long time friend with this person and doesn't want to end friendship  States that interactions with this friend generally make her feel irritated  Review of Systems   Constitutional: Negative for chills and fever  HENT: Negative for congestion and rhinorrhea  Respiratory: Negative for cough and shortness of breath  Cardiovascular: Negative for chest pain and palpitations  Gastrointestinal: Negative for abdominal pain, constipation, diarrhea, nausea and vomiting  Neurological: Negative for dizziness and headaches         Current Outpatient Medications on File Prior to Visit   Medication Sig   • albuterol (ACCUNEB) 0 63 MG/3ML nebulizer solution Take 3 mL (0 63 mg total) by nebulization every 6 (six) hours as needed for wheezing or shortness of breath   • albuterol (PROVENTIL HFA,VENTOLIN HFA) 90 mcg/act inhaler Inhale 1 puff every 6 (six) hours as needed for wheezing   • aspirin 81 mg chewable tablet Chew 81 mg daily At Night   • FLUoxetine (PROzac) 40 MG capsule Take 1 capsule (40 mg total) by mouth 2 (two) times a day   • hydrochlorothiazide (HYDRODIURIL) 25 mg tablet Take 25 mg by mouth in the morning   • levothyroxine 50 mcg tablet Take 50 mcg by mouth "daily   • montelukast (SINGULAIR) 10 mg tablet Take 10 mg by mouth daily at bedtime   • simvastatin (ZOCOR) 10 mg tablet Take 10 mg by mouth daily at bedtime   • Symbicort 160-4 5 MCG/ACT inhaler USE 2 INHALATIONS BY MOUTH  TWICE DAILY RINSE MOUTH  AFTER USE   • [DISCONTINUED] pantoprazole (PROTONIX) 40 mg tablet Take 1 tablet (40 mg total) by mouth daily       Objective     /66 (BP Location: Left arm, Patient Position: Sitting, Cuff Size: Large)   Pulse 100   Temp 98 6 °F (37 °C) (Tympanic)   Resp 16   Ht 5' 1\" (1 549 m)   Wt 80 3 kg (177 lb)   SpO2 98%   BMI 33 44 kg/m²     Physical Exam  Vitals reviewed  Constitutional:       Appearance: Normal appearance  HENT:      Head: Normocephalic and atraumatic  Mouth/Throat:      Mouth: Mucous membranes are moist    Eyes:      Extraocular Movements: Extraocular movements intact  Cardiovascular:      Rate and Rhythm: Normal rate and regular rhythm  Heart sounds: Normal heart sounds  Pulmonary:      Effort: Pulmonary effort is normal       Breath sounds: Normal breath sounds  Neurological:      General: No focal deficit present  Mental Status: She is alert and oriented to person, place, and time     Psychiatric:         Mood and Affect: Mood normal          Behavior: Behavior normal        Amber Bench, DO  "

## 2023-07-23 DIAGNOSIS — K30 INDIGESTION: ICD-10-CM

## 2023-07-24 RX ORDER — PANTOPRAZOLE SODIUM 40 MG/1
40 TABLET, DELAYED RELEASE ORAL DAILY
Qty: 60 TABLET | Refills: 5 | Status: SHIPPED | OUTPATIENT
Start: 2023-07-24

## 2023-07-27 ENCOUNTER — OFFICE VISIT (OUTPATIENT)
Dept: FAMILY MEDICINE CLINIC | Facility: CLINIC | Age: 83
End: 2023-07-27
Payer: MEDICARE

## 2023-07-27 VITALS
WEIGHT: 173.5 LBS | OXYGEN SATURATION: 96 % | HEIGHT: 61 IN | SYSTOLIC BLOOD PRESSURE: 120 MMHG | BODY MASS INDEX: 32.76 KG/M2 | DIASTOLIC BLOOD PRESSURE: 80 MMHG | HEART RATE: 84 BPM | TEMPERATURE: 98.1 F

## 2023-07-27 DIAGNOSIS — J02.0 STREP THROAT: Primary | ICD-10-CM

## 2023-07-27 DIAGNOSIS — R00.0 RACING HEART BEAT: ICD-10-CM

## 2023-07-27 LAB — S PYO AG THROAT QL: POSITIVE

## 2023-07-27 PROCEDURE — 87880 STREP A ASSAY W/OPTIC: CPT | Performed by: STUDENT IN AN ORGANIZED HEALTH CARE EDUCATION/TRAINING PROGRAM

## 2023-07-27 PROCEDURE — 99213 OFFICE O/P EST LOW 20 MIN: CPT | Performed by: STUDENT IN AN ORGANIZED HEALTH CARE EDUCATION/TRAINING PROGRAM

## 2023-07-27 RX ORDER — FAMOTIDINE 40 MG/1
TABLET, FILM COATED ORAL
COMMUNITY
Start: 2023-07-09

## 2023-07-27 RX ORDER — AMOXICILLIN AND CLAVULANATE POTASSIUM 875; 125 MG/1; MG/1
1 TABLET, FILM COATED ORAL EVERY 12 HOURS SCHEDULED
Qty: 14 TABLET | Refills: 0 | Status: SHIPPED | OUTPATIENT
Start: 2023-07-27 | End: 2023-08-03

## 2023-07-27 NOTE — PROGRESS NOTES
Name: Beatriz Charlton      : 1940      MRN: [id number]  Encounter Provider: Shaista Guthrie DO  Encounter Date: 2023   Encounter department: [address] [place] [address]     1. Strep throat  Assessment & Plan:  Rapid Strep positive  Augmentin BID x 7 days  Encouraged isolation/mask wearing during first 24hrs while on abx    Orders:  -     amoxicillin-clavulanate (AUGMENTIN) 875-125 mg per tablet; Take 1 tablet by mouth every 12 (twelve) hours for 7 days  -     POCT rapid strepA    2. Racing heart beat  Assessment & Plan:  Patient reports that prior to being sick she noticed her heart rate was in the 120's while walking. Will check Holter Monitor and Echocardiogram. Will consider stress test in the future. Orders:  -     Echo complete w/ contrast if indicated; Future; Expected date: 2023  -     Holter monitor; Future; Expected date: 2023         Subjective      Sore Throat   This is a new problem. The current episode started yesterday. The problem has been rapidly worsening. There has been no fever. The pain is mild. Associated symptoms include congestion, coughing and shortness of breath. Pertinent negatives include no abdominal pain, diarrhea, headaches or vomiting. She has had no exposure to strep or mono. She has tried nothing for the symptoms. Review of Systems   Constitutional: Negative for chills and fever. HENT: Positive for congestion and sore throat. Respiratory: Positive for cough and shortness of breath. Cardiovascular: Positive for palpitations. Negative for chest pain. Gastrointestinal: Negative for abdominal pain, constipation, diarrhea, nausea and vomiting. Neurological: Negative for dizziness and headaches.        Current Outpatient Medications on File Prior to Visit   Medication Sig   • albuterol (ACCUNEB) 0.63 MG/3ML nebulizer solution Take 3 mL (0.63 mg total) by nebulization every 6 (six) hours as needed for wheezing or shortness of breath   • albuterol (PROVENTIL HFA,VENTOLIN HFA) 90 mcg/act inhaler Inhale 1 puff every 6 (six) hours as needed for wheezing   • aspirin 81 mg chewable tablet Chew 81 mg daily At Night   • famotidine (PEPCID) 40 MG tablet    • FLUoxetine (PROzac) 40 MG capsule Take 1 capsule (40 mg total) by mouth 2 (two) times a day   • hydrochlorothiazide (HYDRODIURIL) 25 mg tablet Take 25 mg by mouth in the morning   • levothyroxine 50 mcg tablet Take 50 mcg by mouth daily   • montelukast (SINGULAIR) 10 mg tablet Take 10 mg by mouth daily at bedtime   • pantoprazole (PROTONIX) 40 mg tablet TAKE 1 TABLET BY MOUTH DAILY   • simvastatin (ZOCOR) 10 mg tablet Take 10 mg by mouth daily at bedtime   • Symbicort 160-4.5 MCG/ACT inhaler USE 2 INHALATIONS BY MOUTH  TWICE DAILY RINSE MOUTH  AFTER USE       Objective     /80 (BP Location: Right arm, Patient Position: Sitting, Cuff Size: Large)   Pulse 84   Temp 98.1 °F (36.7 °C) (Temporal)   Ht 5' 1" (1.549 m)   Wt 78.7 kg (173 lb 8 oz)   SpO2 96%   BMI 32.78 kg/m²     Physical Exam  Vitals reviewed. Constitutional:       Appearance: She is well-developed. HENT:      Head: Normocephalic and atraumatic. Mouth/Throat:      Mouth: Mucous membranes are moist.      Pharynx: Posterior oropharyngeal erythema present. No oropharyngeal exudate or uvula swelling. Tonsils: No tonsillar exudate or tonsillar abscesses. Cardiovascular:      Rate and Rhythm: Normal rate and regular rhythm. Heart sounds: Normal heart sounds. Musculoskeletal:      Cervical back: Neck supple. Neurological:      General: No focal deficit present. Mental Status: She is alert and oriented to person, place, and time.    Psychiatric:         Mood and Affect: Mood normal.         Behavior: Behavior normal.       Estela Judge DO

## 2023-07-27 NOTE — ASSESSMENT & PLAN NOTE
Rapid Strep positive  Augmentin BID x 7 days  Encouraged isolation/mask wearing during first 24hrs while on abx

## 2023-07-27 NOTE — ASSESSMENT & PLAN NOTE
Patient reports that prior to being sick she noticed her heart rate was in the 120's while walking. Will check Holter Monitor and Echocardiogram. Will consider stress test in the future.

## 2023-08-07 ENCOUNTER — HOSPITAL ENCOUNTER (OUTPATIENT)
Dept: NON INVASIVE DIAGNOSTICS | Facility: CLINIC | Age: 83
Discharge: HOME | End: 2023-08-07
Attending: STUDENT IN AN ORGANIZED HEALTH CARE EDUCATION/TRAINING PROGRAM
Payer: MEDICARE

## 2023-08-07 VITALS
DIASTOLIC BLOOD PRESSURE: 80 MMHG | SYSTOLIC BLOOD PRESSURE: 120 MMHG | HEART RATE: 84 BPM | HEIGHT: 61 IN | WEIGHT: 173 LBS | BODY MASS INDEX: 32.66 KG/M2

## 2023-08-07 DIAGNOSIS — R00.0 RACING HEART BEAT: ICD-10-CM

## 2023-08-07 LAB
AORTIC ROOT: 2.8 CM
APICAL FOUR CHAMBER EJECTION FRACTION: 65 %
ASCENDING AORTA: 3.2 CM
E WAVE DECELERATION TIME: 191 MS
FRACTIONAL SHORTENING: 28 % (ref 28–44)
INTERVENTRICULAR SEPTUM IN DIASTOLE (PARASTERNAL SHORT AXIS VIEW): 1.1 CM
INTERVENTRICULAR SEPTUM: 1.1 CM (ref 0.6–1.1)
LAAS-AP2: 13.7 CM2
LAAS-AP4: 15.2 CM2
LEFT ATRIUM SIZE: 3.2 CM
LEFT ATRIUM VOLUME (MOD BIPLANE): 36 ML
LEFT INTERNAL DIMENSION IN SYSTOLE: 2.8 CM (ref 2.1–4)
LEFT VENTRICULAR INTERNAL DIMENSION IN DIASTOLE: 3.9 CM (ref 3.5–6)
LEFT VENTRICULAR POSTERIOR WALL IN END DIASTOLE: 1.1 CM
LEFT VENTRICULAR STROKE VOLUME: 37 ML
LVSV (TEICH): 37 ML
MV E'TISSUE VEL-SEP: 7 CM/S
MV PEAK A VEL: 0.88 M/S
MV PEAK E VEL: 71 CM/S
MV STENOSIS PRESSURE HALF TIME: 55 MS
MV VALVE AREA P 1/2 METHOD: 4 CM2
RIGHT ATRIUM AREA SYSTOLE A4C: 8.8 CM2
RIGHT VENTRICLE ID DIMENSION: 2.8 CM
SL CV LEFT ATRIUM LENGTH A2C: 4.7 CM
SL CV LV EF: 60
SL CV PED ECHO LEFT VENTRICLE DIASTOLIC VOLUME (MOD BIPLANE) 2D: 66 ML
SL CV PED ECHO LEFT VENTRICLE SYSTOLIC VOLUME (MOD BIPLANE) 2D: 29 ML
TR MAX PG: 25 MMHG
TR PEAK VELOCITY: 2.5 M/S
TRICUSPID ANNULAR PLANE SYSTOLIC EXCURSION: 1.8 CM
TRICUSPID VALVE PEAK REGURGITATION VELOCITY: 2.49 M/S

## 2023-08-07 PROCEDURE — 93306 TTE W/DOPPLER COMPLETE: CPT

## 2023-08-07 PROCEDURE — 93225 XTRNL ECG REC<48 HRS REC: CPT

## 2023-08-07 PROCEDURE — 93306 TTE W/DOPPLER COMPLETE: CPT | Performed by: INTERNAL MEDICINE

## 2023-08-07 PROCEDURE — 93226 XTRNL ECG REC<48 HR SCAN A/R: CPT

## 2023-08-08 ENCOUNTER — TELEPHONE (OUTPATIENT)
Dept: FAMILY MEDICINE CLINIC | Facility: CLINIC | Age: 83
End: 2023-08-08

## 2023-08-08 NOTE — TELEPHONE ENCOUNTER
Patient informed     ----- Message from Darryle Jesus, DO sent at 8/8/2023  7:38 AM EDT -----  Normal ECHO, Pending Holter monitor report

## 2023-08-10 ENCOUNTER — TELEPHONE (OUTPATIENT)
Dept: FAMILY MEDICINE CLINIC | Facility: CLINIC | Age: 83
End: 2023-08-10

## 2023-08-10 DIAGNOSIS — J02.0 STREP THROAT: Primary | ICD-10-CM

## 2023-08-10 PROCEDURE — 93227 XTRNL ECG REC<48 HR R&I: CPT | Performed by: INTERNAL MEDICINE

## 2023-08-10 RX ORDER — PREDNISONE 20 MG/1
TABLET ORAL
Qty: 20 TABLET | Refills: 0 | Status: SHIPPED | OUTPATIENT
Start: 2023-08-10 | End: 2023-08-22

## 2023-08-10 NOTE — TELEPHONE ENCOUNTER
I will send a few days of prednisone to her pharmacy. If no improvement after course is complete she should make a f/u appt.  Script sent to pharmacy

## 2023-08-10 NOTE — TELEPHONE ENCOUNTER
Patient called to inform you that she is still having a sore throat after completing her reg.  Wanted to know of any recommendations

## 2023-08-14 ENCOUNTER — TELEPHONE (OUTPATIENT)
Dept: FAMILY MEDICINE CLINIC | Facility: CLINIC | Age: 83
End: 2023-08-14

## 2023-08-14 ENCOUNTER — TELEPHONE (OUTPATIENT)
Age: 83
End: 2023-08-14

## 2023-08-14 NOTE — TELEPHONE ENCOUNTER
Patient called with an update. She states that she is feeling much better and she has a thin white coating on her tongue.

## 2023-08-14 NOTE — TELEPHONE ENCOUNTER
Left message for pt to call office to schedule appt for fu for November for [facility]

## 2023-11-07 DIAGNOSIS — J44.89 ASTHMA WITH COPD (CHRONIC OBSTRUCTIVE PULMONARY DISEASE): ICD-10-CM

## 2023-11-08 RX ORDER — BUDESONIDE AND FORMOTEROL FUMARATE DIHYDRATE 160; 4.5 UG/1; UG/1
AEROSOL RESPIRATORY (INHALATION)
Qty: 30.6 G | Refills: 3 | Status: SHIPPED | OUTPATIENT
Start: 2023-11-08

## 2023-12-11 ENCOUNTER — RA CDI HCC (OUTPATIENT)
Dept: OTHER | Facility: HOSPITAL | Age: 83
End: 2023-12-11

## 2023-12-14 ENCOUNTER — APPOINTMENT (OUTPATIENT)
Dept: LAB | Facility: CLINIC | Age: 83
End: 2023-12-14
Payer: MEDICARE

## 2023-12-14 ENCOUNTER — OFFICE VISIT (OUTPATIENT)
Dept: FAMILY MEDICINE CLINIC | Facility: CLINIC | Age: 83
End: 2023-12-14
Payer: MEDICARE

## 2023-12-14 VITALS
HEART RATE: 89 BPM | OXYGEN SATURATION: 95 % | DIASTOLIC BLOOD PRESSURE: 68 MMHG | HEIGHT: 61 IN | WEIGHT: 176 LBS | TEMPERATURE: 98.3 F | BODY MASS INDEX: 33.23 KG/M2 | SYSTOLIC BLOOD PRESSURE: 116 MMHG

## 2023-12-14 DIAGNOSIS — Z13.220 LIPID SCREENING: ICD-10-CM

## 2023-12-14 DIAGNOSIS — K21.9 GASTROESOPHAGEAL REFLUX DISEASE WITHOUT ESOPHAGITIS: ICD-10-CM

## 2023-12-14 DIAGNOSIS — Z00.00 MEDICARE ANNUAL WELLNESS VISIT, SUBSEQUENT: Primary | ICD-10-CM

## 2023-12-14 DIAGNOSIS — R45.0 JITTERY FEELING: ICD-10-CM

## 2023-12-14 DIAGNOSIS — R25.1 SHAKY: ICD-10-CM

## 2023-12-14 DIAGNOSIS — R25.2 LEG CRAMPS: ICD-10-CM

## 2023-12-14 DIAGNOSIS — I10 ESSENTIAL HYPERTENSION: ICD-10-CM

## 2023-12-14 LAB
ALBUMIN SERPL BCP-MCNC: 4.5 G/DL (ref 3.5–5)
ALP SERPL-CCNC: 89 U/L (ref 34–104)
ALT SERPL W P-5'-P-CCNC: 25 U/L (ref 7–52)
ANION GAP SERPL CALCULATED.3IONS-SCNC: 11 MMOL/L
AST SERPL W P-5'-P-CCNC: 26 U/L (ref 13–39)
BASOPHILS # BLD AUTO: 0.06 THOUSANDS/ÂΜL (ref 0–0.1)
BASOPHILS NFR BLD AUTO: 1 % (ref 0–1)
BILIRUB SERPL-MCNC: 0.66 MG/DL (ref 0.2–1)
BUN SERPL-MCNC: 20 MG/DL (ref 5–25)
CALCIUM SERPL-MCNC: 9.4 MG/DL (ref 8.4–10.2)
CHLORIDE SERPL-SCNC: 98 MMOL/L (ref 96–108)
CHOLEST SERPL-MCNC: 228 MG/DL
CO2 SERPL-SCNC: 30 MMOL/L (ref 21–32)
CREAT SERPL-MCNC: 0.85 MG/DL (ref 0.6–1.3)
EOSINOPHIL # BLD AUTO: 0.11 THOUSAND/ÂΜL (ref 0–0.61)
EOSINOPHIL NFR BLD AUTO: 2 % (ref 0–6)
ERYTHROCYTE [DISTWIDTH] IN BLOOD BY AUTOMATED COUNT: 13.7 % (ref 11.6–15.1)
GFR SERPL CREATININE-BSD FRML MDRD: 64 ML/MIN/1.73SQ M
GLUCOSE P FAST SERPL-MCNC: 97 MG/DL (ref 65–99)
HCT VFR BLD AUTO: 45.7 % (ref 34.8–46.1)
HDLC SERPL-MCNC: 66 MG/DL
HGB BLD-MCNC: 15.1 G/DL (ref 11.5–15.4)
IMM GRANULOCYTES # BLD AUTO: 0.01 THOUSAND/UL (ref 0–0.2)
IMM GRANULOCYTES NFR BLD AUTO: 0 % (ref 0–2)
LDLC SERPL CALC-MCNC: 143 MG/DL (ref 0–100)
LYMPHOCYTES # BLD AUTO: 0.93 THOUSANDS/ÂΜL (ref 0.6–4.47)
LYMPHOCYTES NFR BLD AUTO: 17 % (ref 14–44)
MCH RBC QN AUTO: 30 PG (ref 26.8–34.3)
MCHC RBC AUTO-ENTMCNC: 33 G/DL (ref 31.4–37.4)
MCV RBC AUTO: 91 FL (ref 82–98)
MONOCYTES # BLD AUTO: 0.43 THOUSAND/ÂΜL (ref 0.17–1.22)
MONOCYTES NFR BLD AUTO: 8 % (ref 4–12)
NEUTROPHILS # BLD AUTO: 4 THOUSANDS/ÂΜL (ref 1.85–7.62)
NEUTS SEG NFR BLD AUTO: 72 % (ref 43–75)
NONHDLC SERPL-MCNC: 162 MG/DL
NRBC BLD AUTO-RTO: 0 /100 WBCS
PLATELET # BLD AUTO: 238 THOUSANDS/UL (ref 149–390)
PMV BLD AUTO: 9.9 FL (ref 8.9–12.7)
POTASSIUM SERPL-SCNC: 3.6 MMOL/L (ref 3.5–5.3)
PROT SERPL-MCNC: 6.9 G/DL (ref 6.4–8.4)
RBC # BLD AUTO: 5.03 MILLION/UL (ref 3.81–5.12)
SODIUM SERPL-SCNC: 139 MMOL/L (ref 135–147)
TRIGL SERPL-MCNC: 94 MG/DL
TSH SERPL DL<=0.05 MIU/L-ACNC: 2.68 UIU/ML (ref 0.45–4.5)
WBC # BLD AUTO: 5.54 THOUSAND/UL (ref 4.31–10.16)

## 2023-12-14 PROCEDURE — 80053 COMPREHEN METABOLIC PANEL: CPT

## 2023-12-14 PROCEDURE — G0439 PPPS, SUBSEQ VISIT: HCPCS | Performed by: STUDENT IN AN ORGANIZED HEALTH CARE EDUCATION/TRAINING PROGRAM

## 2023-12-14 PROCEDURE — 84443 ASSAY THYROID STIM HORMONE: CPT

## 2023-12-14 PROCEDURE — 85025 COMPLETE CBC W/AUTO DIFF WBC: CPT

## 2023-12-14 PROCEDURE — 99213 OFFICE O/P EST LOW 20 MIN: CPT | Performed by: STUDENT IN AN ORGANIZED HEALTH CARE EDUCATION/TRAINING PROGRAM

## 2023-12-14 PROCEDURE — 36415 COLL VENOUS BLD VENIPUNCTURE: CPT

## 2023-12-14 PROCEDURE — 80061 LIPID PANEL: CPT

## 2023-12-14 RX ORDER — HYDROCHLOROTHIAZIDE 25 MG/1
25 TABLET ORAL DAILY
Qty: 90 TABLET | Refills: 1 | Status: SHIPPED | OUTPATIENT
Start: 2023-12-14

## 2023-12-14 RX ORDER — FAMOTIDINE 40 MG/1
20 TABLET, FILM COATED ORAL 2 TIMES DAILY
Qty: 60 TABLET | Refills: 0 | Status: SHIPPED | OUTPATIENT
Start: 2023-12-14

## 2023-12-14 NOTE — ASSESSMENT & PLAN NOTE
Patient reports feeling anxious and shaky. Also reports leg cramps that improved with drinking water    Will check CMP, CBC and TSH.  Will f/u pending results

## 2023-12-14 NOTE — ASSESSMENT & PLAN NOTE
Medicare annual wellness visit completed today  Routine blood wrk has been ordered, will f/u pending results.

## 2023-12-14 NOTE — PROGRESS NOTES
Assessment and Plan:     Problem List Items Addressed This Visit       Gastroesophageal reflux disease without esophagitis    Relevant Medications    famotidine (PEPCID) 40 MG tablet    Essential hypertension     BP stable continue HCTZ 25mg daily         Relevant Medications    hydrochlorothiazide (HYDRODIURIL) 25 mg tablet    Medicare annual wellness visit, subsequent - Primary     Medicare annual wellness visit completed today  Routine blood wrk has been ordered, will f/u pending results. Jittery feeling     Patient reports feeling anxious and shaky. Also reports leg cramps that improved with drinking water    Will check CMP, CBC and TSH. Will f/u pending results         Relevant Orders    CBC and differential    Comprehensive metabolic panel    TSH, 3rd generation with Free T4 reflex     Other Visit Diagnoses       Shaky        Relevant Orders    CBC and differential    Comprehensive metabolic panel    TSH, 3rd generation with Free T4 reflex    Leg cramps        Relevant Orders    CBC and differential    Comprehensive metabolic panel    TSH, 3rd generation with Free T4 reflex    Lipid screening        Relevant Orders    Lipid panel          BMI Counseling: Body mass index is 33.25 kg/m². The BMI is above normal. Nutrition recommendations include moderation in carbohydrate intake. Exercise recommendations include moderate physical activity 150 minutes/week and strength training exercises. Rationale for BMI follow-up plan is due to patient being overweight or obese. Falls Plan of Care: balance, strength, and gait training instructions were provided. Medications that increase falls were reviewed. Vitamin D supplementation was recommended. Preventive health issues were discussed with patient, and age appropriate screening tests were ordered as noted in patient's After Visit Summary.   Personalized health advice and appropriate referrals for health education or preventive services given if needed, as noted in patient's After Visit Summary. History of Present Illness:     Patient presents for a Medicare Wellness Visit    Patient presents today for AWV  States that she's been feeling really jittery  Is worried that she may have some anxiety          Patient Care Team:  Rama Colunga DO as PCP - General (Family Medicine)  EBONY Ortega MD Landy Maes, MD     Review of Systems:     Review of Systems   Constitutional:  Negative for chills and fever. HENT:  Negative for congestion and rhinorrhea. Respiratory:  Negative for cough and shortness of breath. Cardiovascular:  Negative for chest pain and palpitations. Gastrointestinal:  Negative for abdominal pain, constipation and diarrhea. Neurological:  Negative for dizziness and headaches. Feeling shaky and anxious   Psychiatric/Behavioral:  The patient is nervous/anxious.          Problem List:     Patient Active Problem List   Diagnosis    Chronic obstructive pulmonary disease (HCC)    Acquired hypothyroidism    Asthma    CHUYITA (obstructive sleep apnea)    Other hyperlipidemia    Gastroesophageal reflux disease without esophagitis    Essential hypertension    Depression with anxiety    Medicare annual wellness visit, subsequent    Epigastric pain    CHUYITA on CPAP    Strep throat    Racing heart beat    Jittery feeling      Past Medical and Surgical History:     Past Medical History:   Diagnosis Date    Anxiety     Arthritis     Asthma     COPD (chronic obstructive pulmonary disease) ([address])     COVID 06/2022    CPAP (continuous positive airway pressure) dependence     Depression     Disease of thyroid gland     GERD (gastroesophageal reflux disease)     History of transfusion     Hyperlipidemia     Hypotension     MRSA (methicillin resistant Staphylococcus aureus) infection     about 15yrs ago    Shortness of breath     HUNTER    Sleep apnea     Use of cane as ambulatory aid     sometimes    Wears glasses     Wrist fracture      Past Surgical History:   Procedure Laterality Date    BACK SURGERY  2017     SECTION      x3    COLONOSCOPY      DILATION AND EVACUATION      miscarriage    EGD      INCONTINENCE SURGERY      JOINT REPLACEMENT Left     left knee sugery x2    ND NSL/SINUS NDSC MAX ANTROST W/RMVL TISS MAX SINUS N/A 2022    Procedure: FUNCTIONAL ENDOSCOPIC SINUS SURGERY (FESS) IMAGED GUIDED LEFT MAXILLARY ANTROSTOMY ETHMOIDECTOMY FRONTAL SINUSOTOMY WITH DRAINAGE OF FRONTAL SINUS MUCOCELE;  Surgeon: Elsy Whitehead MD;  Location: [facility];  Service: ENT    REPLACEMENT TOTAL KNEE BILATERAL      SINUS SURGERY      TUBAL LIGATION        Family History:     History reviewed. No pertinent family history. Social History:     Social History     Socioeconomic History    Marital status:       Spouse name: None    Number of children: 3    Years of education: None    Highest education level: None   Occupational History    Occupation: retired   Tobacco Use    Smoking status: Former     Current packs/day: 0.00     Average packs/day: 0.3 packs/day for 30.0 years (7.5 ttl pk-yrs)     Types: Cigarettes     Start date: 1970     Quit date: 2000     Years since quittin.9     Passive exposure: Never    Smokeless tobacco: Never   Vaping Use    Vaping status: Never Used   Substance and Sexual Activity    Alcohol use: Yes     Comment: occasionally cinnamon whiskey    Drug use: No    Sexual activity: Not Currently     Partners: Male     Birth control/protection: None   Other Topics Concern    None   Social History Narrative    None     Social Determinants of Health     Financial Resource Strain: Low Risk  (2023)    Overall Financial Resource Strain (CARDIA)     Difficulty of Paying Living Expenses: Not hard at all   Food Insecurity: Not on file   Transportation Needs: No Transportation Needs (2023)    PRAPARE - Transportation     Lack of Transportation (Medical): No     Lack of Transportation (Non-Medical): No   Physical Activity: Sufficiently Active (6/12/2023)    Exercise Vital Sign     Days of Exercise per Week: 3 days     Minutes of Exercise per Session: 60 min   Stress: Not on file   Social Connections: Not on file   Intimate Partner Violence: Not on file   Housing Stability: Not on file      Medications and Allergies:     Current Outpatient Medications   Medication Sig Dispense Refill    albuterol (ACCUNEB) 0.63 MG/3ML nebulizer solution Take 3 mL (0.63 mg total) by nebulization every 6 (six) hours as needed for wheezing or shortness of breath 1080 mL 3    albuterol (PROVENTIL HFA,VENTOLIN HFA) 90 mcg/act inhaler Inhale 1 puff every 6 (six) hours as needed for wheezing 3 Inhaler 5    aspirin 81 mg chewable tablet Chew 81 mg daily At Night      famotidine (PEPCID) 40 MG tablet Take 0.5 tablets (20 mg total) by mouth 2 (two) times a day 60 tablet 0    FLUoxetine (PROzac) 40 MG capsule Take 1 capsule (40 mg total) by mouth 2 (two) times a day 60 capsule 0    hydrochlorothiazide (HYDRODIURIL) 25 mg tablet Take 1 tablet (25 mg total) by mouth in the morning 90 tablet 1    levothyroxine 50 mcg tablet Take 50 mcg by mouth daily      montelukast (SINGULAIR) 10 mg tablet Take 10 mg by mouth daily at bedtime      simvastatin (ZOCOR) 10 mg tablet Take 10 mg by mouth daily at bedtime      Symbicort 160-4.5 MCG/ACT inhaler USE 2 INHALATIONS BY MOUTH TWICE DAILY RINSE MOUTH AFTER USE 30.6 g 3     No current facility-administered medications for this visit. Allergies   Allergen Reactions    Ascorbate - Food Allergy GI Intolerance     Patient reported acid reflux with eating oranges.        Immunizations:     Immunization History   Administered Date(s) Administered    COVID-19 MODERNA VACC 0.5 ML IM 02/08/2021, 03/09/2021, 10/24/2021, 04/08/2022    COVID-19 Pfizer Vac BIVALENT Edward-sucrose 12 Yr+ IM 12/13/2022    INFLUENZA 10/24/2016, 08/16/2018    Influenza Split High Dose Preservative Free IM 09/12/2012, 09/13/2017, 08/16/2018, 08/27/2020    Influenza, Seasonal Vaccine, Quadrivalent, Adjuvanted, . 5e 10/18/2022    Influenza, high dose seasonal 0.7 mL 08/27/2020    Influenza, seasonal, injectable 1941    Pneumococcal Conjugate 13-Valent 03/23/2016    Pneumococcal Polysaccharide PPV23 1941, 01/01/2014    Tdap 1941, 1941, 09/26/2016    Zoster 1940, 1941    Zoster Vaccine Recombinant 08/16/2018, 01/25/2019    influenza, trivalent, adjuvanted 09/13/2019      Health Maintenance:         Topic Date Due    DXA SCAN  05/03/2026         Topic Date Due    Influenza Vaccine (1) 09/01/2023    COVID-19 Vaccine (6 - 2023-24 season) 09/01/2023      Medicare Screening Tests and Risk Assessments:     Xena Paz is here for her Subsequent Wellness visit. Last Medicare Wellness visit information reviewed, patient interviewed and updates made to the record today. Health Risk Assessment:   Patient rates overall health as fair. Patient feels that their physical health rating is same. Patient is satisfied with their life. Eyesight was rated as same. Hearing was rated as same. Patient feels that their emotional and mental health rating is same. Patients states they are never, rarely angry. Patient states they are often unusually tired/fatigued. Pain experienced in the last 7 days has been some. Patient's pain rating has been 5/10. Patient states that she has experienced no weight loss or gain in last 6 months. Depression Screening:   PHQ-9 Score: 5      Fall Risk Screening: In the past year, patient has experienced: history of falling in past year    Number of falls: 2 or more  Injured during fall?: Yes    Feels unsteady when standing or walking?: Yes    Worried about falling?: Yes      Urinary Incontinence Screening:   Patient has not leaked urine accidently in the last six months. Home Safety:  Patient does not have trouble with stairs inside or outside of their home.  Patient has working smoke alarms and has working carbon monoxide detector. Home safety hazards include: none. Nutrition:   Current diet is Regular. Medications:   Patient is currently taking over-the-counter supplements. OTC medications include: see medication list. Patient is able to manage medications. Activities of Daily Living (ADLs)/Instrumental Activities of Daily Living (IADLs):   Walk and transfer into and out of bed and chair?: Yes  Dress and groom yourself?: Yes    Bathe or shower yourself?: Yes    Feed yourself? Yes  Do your laundry/housekeeping?: Yes  Manage your money, pay your bills and track your expenses?: Yes  Make your own meals?: Yes    Do your own shopping?: Yes    Previous Hospitalizations:   Any hospitalizations or ED visits within the last 12 months?: Yes      Advance Care Planning:   Living will: Yes    Advanced directive: Yes    ACP document given: Yes      PREVENTIVE SCREENINGS      Cardiovascular Screening:    General: History Lipid Disorder and Screening Current      Diabetes Screening:     General: Risks and Benefits Discussed    Due for: Blood Glucose      Colorectal Cancer Screening:     General: Screening Not Indicated      Breast Cancer Screening:     General: Screening Not Indicated      Cervical Cancer Screening:    General: Screening Not Indicated      Osteoporosis Screening:    General: Screening Not Indicated      Abdominal Aortic Aneurysm (AAA) Screening:        General: Screening Not Indicated      Lung Cancer Screening:     General: Screening Not Indicated      Hepatitis C Screening:    General: Screening Not Indicated    Screening, Brief Intervention, and Referral to Treatment (SBIRT)    Screening  Typical number of drinks in a day: 0  Typical number of drinks in a week: 0  Interpretation: Low risk drinking behavior. Single Item Drug Screening:  How often have you used an illegal drug (including marijuana) or a prescription medication for non-medical reasons in the past year? never    Single Item Drug Screen Score: 0  Interpretation: Negative screen for possible drug use disorder    No results found. Physical Exam:     /68 (BP Location: Left arm, Patient Position: Sitting, Cuff Size: Large)   Pulse 89   Temp 98.3 °F (36.8 °C) (Temporal)   Ht 5' 1" (1.549 m)   Wt 79.8 kg (176 lb)   SpO2 95%   BMI 33.25 kg/m²     Physical Exam  Vitals reviewed. Constitutional:       Appearance: Normal appearance. HENT:      Head: Normocephalic and atraumatic. Eyes:      Extraocular Movements: Extraocular movements intact. Cardiovascular:      Rate and Rhythm: Normal rate and regular rhythm. Heart sounds: Normal heart sounds. Pulmonary:      Effort: Pulmonary effort is normal.      Breath sounds: Normal breath sounds. Neurological:      General: No focal deficit present. Mental Status: She is alert and oriented to person, place, and time.    Psychiatric:      Comments: Anxious          Florinda Gaston, DO

## 2023-12-14 NOTE — PATIENT INSTRUCTIONS
Medicare Preventive Visit Patient Instructions  Thank you for completing your Welcome to Medicare Visit or Medicare Annual Wellness Visit today. Your next wellness visit will be due in one year (12/14/2024). The screening/preventive services that you may require over the next 5-10 years are detailed below. Some tests may not apply to you based off risk factors and/or age. Screening tests ordered at today's visit but not completed yet may show as past due. Also, please note that scanned in results may not display below. Preventive Screenings:  Service Recommendations Previous Testing/Comments   Colorectal Cancer Screening  * Colonoscopy    * Fecal Occult Blood Test (FOBT)/Fecal Immunochemical Test (FIT)  * Fecal DNA/Cologuard Test  * Flexible Sigmoidoscopy Age: 43-73 years old   Colonoscopy: every 10 years (may be performed more frequently if at higher risk)  OR  FOBT/FIT: every 1 year  OR  Cologuard: every 3 years  OR  Sigmoidoscopy: every 5 years  Screening may be recommended earlier than age 39 if at higher risk for colorectal cancer. Also, an individualized decision between you and your healthcare provider will decide whether screening between the ages of 77-80 would be appropriate. Colonoscopy: Not on file  FOBT/FIT: Not on file  Cologuard: Not on file  Sigmoidoscopy: Not on file          Breast Cancer Screening Age: 36 years old  Frequency: every 1-2 years  Not required if history of left and right mastectomy Mammogram: 06/09/2014        Cervical Cancer Screening Between the ages of 21-29, pap smear recommended once every 3 years. Between the ages of 32-69, can perform pap smear with HPV co-testing every 5 years.    Recommendations may differ for women with a history of total hysterectomy, cervical cancer, or abnormal pap smears in past. Pap Smear: Not on file    Screening Not Indicated   Hepatitis C Screening Once for adults born between [address] [place] [address]  More frequently in patients at high risk for Hepatitis C Hep C Antibody: Not on file        Diabetes Screening 1-2 times per year if you're at risk for diabetes or have pre-diabetes Fasting glucose: 126 mg/dL (10/4/2022)  A1C: 6.1 % (4/20/2022)      Cholesterol Screening Once every 5 years if you don't have a lipid disorder. May order more often based on risk factors. Lipid panel: 04/20/2023    Screening Not Indicated  History Lipid Disorder     Other Preventive Screenings Covered by Medicare:  Abdominal Aortic Aneurysm (AAA) Screening: covered once if your at risk. You're considered to be at risk if you have a family history of AAA. Lung Cancer Screening: covers low dose CT scan once per year if you meet all of the following conditions: (1) Age 48-67; (2) No signs or symptoms of lung cancer; (3) Current smoker or have quit smoking within the last 15 years; (4) You have a tobacco smoking history of at least 20 pack years (packs per day multiplied by number of years you smoked); (5) You get a written order from a healthcare provider. Glaucoma Screening: covered annually if you're considered high risk: (1) You have diabetes OR (2) Family history of glaucoma OR (3)  aged 48 and older OR (3)  American aged 72 and older  Osteoporosis Screening: covered every 2 years if you meet one of the following conditions: (1) You're estrogen deficient and at risk for osteoporosis based off medical history and other findings; (2) Have a vertebral abnormality; (3) On glucocorticoid therapy for more than 3 months; (4) Have primary hyperparathyroidism; (5) On osteoporosis medications and need to assess response to drug therapy. Last bone density test (DXA Scan): 05/03/2021. HIV Screening: covered annually if you're between the age of 14-79. Also covered annually if you are younger than 13 and older than 72 with risk factors for HIV infection. For pregnant patients, it is covered up to 3 times per pregnancy.     Immunizations:  Immunization Recommendations Influenza Vaccine Annual influenza vaccination during flu season is recommended for all persons aged >= 6 months who do not have contraindications   Pneumococcal Vaccine   * Pneumococcal conjugate vaccine = PCV13 (Prevnar 13), PCV15 (Vaxneuvance), PCV20 (Prevnar 20)  * Pneumococcal polysaccharide vaccine = PPSV23 (Pneumovax) Adults 29-20 yo with certain risk factors or if 69+ yo  If never received any pneumonia vaccine: recommend Prevnar 20 (PCV20)  Give PCV20 if previously received 1 dose of PCV13 or PPSV23   Hepatitis B Vaccine 3 dose series if at intermediate or high risk (ex: diabetes, end stage renal disease, liver disease)   Respiratory syncytial virus (RSV) Vaccine - COVERED BY MEDICARE PART D  * RSVPreF3 (Arexvy) CDC recommends that adults 61years of age and older may receive a single dose of RSV vaccine using shared clinical decision-making (SCDM)   Tetanus (Td) Vaccine - COST NOT COVERED BY MEDICARE PART B Following completion of primary series, a booster dose should be given every 10 years to maintain immunity against tetanus. Td may also be given as tetanus wound prophylaxis. Tdap Vaccine - COST NOT COVERED BY MEDICARE PART B Recommended at least once for all adults. For pregnant patients, recommended with each pregnancy. Shingles Vaccine (Shingrix) - COST NOT COVERED BY MEDICARE PART B  2 shot series recommended in those 19 years and older who have or will have weakened immune systems or those 50 years and older     Health Maintenance Due:      Topic Date Due   • DXA SCAN  05/03/2026     Immunizations Due:      Topic Date Due   • Influenza Vaccine (1) 09/01/2023   • COVID-19 Vaccine (6 - 2023-24 season) 09/01/2023     Advance Directives   What are advance directives? Advance directives are legal documents that state your wishes and plans for medical care. These plans are made ahead of time in case you lose your ability to make decisions for yourself.  Advance directives can apply to any medical decision, such as the treatments you want, and if you want to donate organs. What are the types of advance directives? There are many types of advance directives, and each state has rules about how to use them. You may choose a combination of any of the following:  Living will: This is a written record of the treatment you want. You can also choose which treatments you do not want, which to limit, and which to stop at a certain time. This includes surgery, medicine, IV fluid, and tube feedings. Durable power of  for [facility]): This is a written record that states who you want to make healthcare choices for you when you are unable to make them for yourself. This person, called a proxy, is usually a family member or a friend. You may choose more than 1 proxy. Do not resuscitate (DNR) order:  A DNR order is used in case your heart stops beating or you stop breathing. It is a request not to have certain forms of treatment, such as CPR. A DNR order may be included in other types of advance directives. Medical directive: This covers the care that you want if you are in a coma, near death, or unable to make decisions for yourself. You can list the treatments you want for each condition. Treatment may include pain medicine, surgery, blood transfusions, dialysis, IV or tube feedings, and a ventilator (breathing machine). Values history: This document has questions about your views, beliefs, and how you feel and think about life. This information can help others choose the care that you would choose. Why are advance directives important? An advance directive helps you control your care. Although spoken wishes may be used, it is better to have your wishes written down. Spoken wishes can be misunderstood, or not followed. Treatments may be given even if you do not want them. An advance directive may make it easier for your family to make difficult choices about your care.    Fall Prevention    Fall prevention  includes ways to make your home and other areas safer. It also includes ways you can move more carefully to prevent a fall. Health conditions that cause changes in your blood pressure, vision, or muscle strength and coordination may increase your risk for falls. Medicines may also increase your risk for falls if they make you dizzy, weak, or sleepy. Fall prevention tips:   Stand or sit up slowly. Use assistive devices as directed. Wear shoes that fit well and have soles that . Wear a personal alarm. Stay active. Manage your medical conditions. Home Safety Tips:  Add items to prevent falls in the bathroom. Keep paths clear. Install bright lights in your home. Keep items you use often on shelves within reach. Paint or place reflective tape on the edges of your stairs. Weight Management   Why it is important to manage your weight:  Being overweight increases your risk of health conditions such as heart disease, high blood pressure, type 2 diabetes, and certain types of cancer. It can also increase your risk for osteoarthritis, sleep apnea, and other respiratory problems. Aim for a slow, steady weight loss. Even a small amount of weight loss can lower your risk of health problems. How to lose weight safely:  A safe and healthy way to lose weight is to eat fewer calories and get regular exercise. You can lose up about 1 pound a week by decreasing the number of calories you eat by 500 calories each day. Healthy meal plan for weight management:  A healthy meal plan includes a variety of foods, contains fewer calories, and helps you stay healthy. A healthy meal plan includes the following:  Eat whole-grain foods more often. A healthy meal plan should contain fiber. Fiber is the part of grains, fruits, and vegetables that is not broken down by your body. Whole-grain foods are healthy and provide extra fiber in your diet.  Some examples of whole-grain foods are whole-wheat breads and pastas, oatmeal, brown rice, and bulgur. Eat a variety of vegetables every day. Include dark, leafy greens such as spinach, kale, niharika greens, and mustard greens. Eat yellow and orange vegetables such as carrots, sweet potatoes, and winter squash. Eat a variety of fruits every day. Choose fresh or canned fruit (canned in its own juice or light syrup) instead of juice. Fruit juice has very little or no fiber. Eat low-fat dairy foods. Drink fat-free (skim) milk or 1% milk. Eat fat-free yogurt and low-fat cottage cheese. Try low-fat cheeses such as mozzarella and other reduced-fat cheeses. Choose meat and other protein foods that are low in fat. Choose beans or other legumes such as split peas or lentils. Choose fish, skinless poultry (chicken or turkey), or lean cuts of red meat (beef or pork). Before you cook meat or poultry, cut off any visible fat. Use less fat and oil. Try baking foods instead of frying them. Add less fat, such as margarine, sour cream, regular salad dressing and mayonnaise to foods. Eat fewer high-fat foods. Some examples of high-fat foods include french fries, doughnuts, ice cream, and cakes. Eat fewer sweets. Limit foods and drinks that are high in sugar. This includes candy, cookies, regular soda, and sweetened drinks. Exercise:  Exercise at least 30 minutes per day on most days of the week. Some examples of exercise include walking, biking, dancing, and swimming. You can also fit in more physical activity by taking the stairs instead of the elevator or parking farther away from stores. Ask your healthcare provider about the best exercise plan for you. © Copyright [employer] 2018 Information is for End User's use only and may not be sold, redistributed or otherwise used for commercial purposes.  All illustrations and images included in CareNotes® are the copyrighted property of [employer] or [address] [place] [address]

## 2023-12-18 ENCOUNTER — TELEPHONE (OUTPATIENT)
Dept: FAMILY MEDICINE CLINIC | Facility: CLINIC | Age: 83
End: 2023-12-18

## 2023-12-18 NOTE — TELEPHONE ENCOUNTER
Patient informed       ----- Message from Lelia Mccord DO sent at 12/18/2023  9:40 AM EST -----  Please let patient know that her labs look okay with exception of her Lipid Panel. Her total cholesterol has increased  and she should be mindful of this in her diet. She should continue Zocor daily and make small changes in diet including cutting back on high fat foods, red meat and fast food.If she continues to rise we can consider increasing the zocor dose.

## 2024-01-19 ENCOUNTER — TELEPHONE (OUTPATIENT)
Age: 84
End: 2024-01-19

## 2024-02-01 ENCOUNTER — TELEPHONE (OUTPATIENT)
Age: 84
End: 2024-02-01

## 2024-02-01 NOTE — TELEPHONE ENCOUNTER
Incoming call:  Dr. Silvestre patient    Patient reports difficult time with CPAP.  Patient states that it is giving her dry mouth and she really dislikes the machine. She is traveling a lot and would like to have an appointment to evaluate if she really needs it. She states that although she is sleeping better, she stays active during the day.       Appointment scheduled.     All questions answered. No further needs at the moment.

## 2024-02-12 PROBLEM — Z00.00 MEDICARE ANNUAL WELLNESS VISIT, SUBSEQUENT: Status: RESOLVED | Noted: 2023-05-10 | Resolved: 2024-02-12

## 2024-04-02 DIAGNOSIS — J30.2 SEASONAL ALLERGIES: Primary | ICD-10-CM

## 2024-04-02 DIAGNOSIS — I10 ESSENTIAL HYPERTENSION: ICD-10-CM

## 2024-04-03 RX ORDER — MONTELUKAST SODIUM 10 MG/1
10 TABLET ORAL
Qty: 90 TABLET | Refills: 1 | Status: SHIPPED | OUTPATIENT
Start: 2024-04-03

## 2024-04-03 RX ORDER — HYDROCHLOROTHIAZIDE 25 MG/1
25 TABLET ORAL DAILY
Qty: 90 TABLET | Refills: 1 | Status: SHIPPED | OUTPATIENT
Start: 2024-04-03

## 2024-04-04 ENCOUNTER — OFFICE VISIT (OUTPATIENT)
Dept: FAMILY MEDICINE CLINIC | Facility: CLINIC | Age: 84
End: 2024-04-04
Payer: MEDICARE

## 2024-04-04 VITALS
SYSTOLIC BLOOD PRESSURE: 110 MMHG | HEART RATE: 100 BPM | BODY MASS INDEX: 32.66 KG/M2 | TEMPERATURE: 97.9 F | OXYGEN SATURATION: 97 % | DIASTOLIC BLOOD PRESSURE: 68 MMHG | HEIGHT: 61 IN | WEIGHT: 173 LBS

## 2024-04-04 DIAGNOSIS — R13.10 DYSPHAGIA, UNSPECIFIED TYPE: Primary | ICD-10-CM

## 2024-04-04 DIAGNOSIS — E78.49 OTHER HYPERLIPIDEMIA: ICD-10-CM

## 2024-04-04 DIAGNOSIS — K21.9 GASTROESOPHAGEAL REFLUX DISEASE WITHOUT ESOPHAGITIS: ICD-10-CM

## 2024-04-04 DIAGNOSIS — H93.19 TINNITUS, UNSPECIFIED LATERALITY: ICD-10-CM

## 2024-04-04 PROCEDURE — G2211 COMPLEX E/M VISIT ADD ON: HCPCS | Performed by: STUDENT IN AN ORGANIZED HEALTH CARE EDUCATION/TRAINING PROGRAM

## 2024-04-04 PROCEDURE — 99214 OFFICE O/P EST MOD 30 MIN: CPT | Performed by: STUDENT IN AN ORGANIZED HEALTH CARE EDUCATION/TRAINING PROGRAM

## 2024-04-04 RX ORDER — SIMVASTATIN 10 MG
10 TABLET ORAL
Qty: 90 TABLET | Refills: 1 | Status: SHIPPED | OUTPATIENT
Start: 2024-04-04

## 2024-04-04 RX ORDER — FAMOTIDINE 40 MG/1
20 TABLET, FILM COATED ORAL 2 TIMES DAILY
Qty: 180 TABLET | Refills: 1 | Status: SHIPPED | OUTPATIENT
Start: 2024-04-04

## 2024-04-04 NOTE — PROGRESS NOTES
Name: Roslyn Manzo      : 1940      MRN: [id number]  Encounter Provider: Lelia Mccord DO  Encounter Date: 2024   Encounter department: [facility] PRIMARY CARE    Assessment & Plan     1. Dysphagia, unspecified type  Assessment & Plan:  Review of Barium swallow from 2021 showing tertiary contractions and dysmotility in mid to lower esophagus.     Will refer patient to GI for further evaluation. Encouraged watching diet and switching to a more soft diet.    Orders:  -     Ambulatory Referral to Gastroenterology; Future    2. Gastroesophageal reflux disease without esophagitis  Assessment & Plan:  Will continue Pepcid BID  Discussed dietary changes in depth with patient.    Orders:  -     famotidine (PEPCID) 40 MG tablet; Take 0.5 tablets (20 mg total) by mouth 2 (two) times a day    3. Tinnitus, unspecified laterality  Assessment & Plan:  Will refer for Audiology testing    Orders:  -     Ambulatory Referral to Audiology; Future    4. Other hyperlipidemia  Assessment & Plan:  Reviewed recent Lipid Panel with patient  Discussed abnormal levels. Encouraged continuance of zocor and also encouraged dietary changes.     Orders:  -     simvastatin (ZOCOR) 10 mg tablet; Take 1 tablet (10 mg total) by mouth daily at bedtime        Depression Screening and Follow-up Plan: Patient was screened for depression during today's encounter. They screened negative with a PHQ-9 score of 4.  Depression Screening Follow-up Plan: Patient's depression screening was positive with a PHQ-2 score of . Their PHQ-9 score was 4. Continue regular follow-up with their psychologist/therapist/psychiatrist who is managing their mental health condition(s).     Subjective      Patient presents today with a number of concern including reflux, dysphagia and crinkling sound in her ear.  Patient states that she has had reflux for a long time, does admit to not eating well, also drinks alcohol intermittently.  "Reports that recently she has also had sensation of things getting stuck in her throat, generally solid foods that she chews up.        Review of Systems   Constitutional:  Negative for chills and fever.   HENT:  Positive for tinnitus and trouble swallowing. Negative for congestion and rhinorrhea.    Respiratory:  Negative for cough and shortness of breath.    Cardiovascular:  Negative for chest pain and palpitations.   Gastrointestinal:  Negative for abdominal pain, constipation, diarrhea, nausea and vomiting.        Reflux   Neurological:  Negative for dizziness and headaches.       Current Outpatient Medications on File Prior to Visit   Medication Sig   • albuterol (ACCUNEB) 0.63 MG/3ML nebulizer solution Take 3 mL (0.63 mg total) by nebulization every 6 (six) hours as needed for wheezing or shortness of breath   • albuterol (PROVENTIL HFA,VENTOLIN HFA) 90 mcg/act inhaler Inhale 1 puff every 6 (six) hours as needed for wheezing   • aspirin 81 mg chewable tablet Chew 81 mg daily At Night   • FLUoxetine (PROzac) 40 MG capsule Take 1 capsule (40 mg total) by mouth 2 (two) times a day   • hydroCHLOROthiazide 25 mg tablet Take 1 tablet (25 mg total) by mouth in the morning   • levothyroxine 50 mcg tablet Take 50 mcg by mouth daily   • montelukast (SINGULAIR) 10 mg tablet Take 1 tablet (10 mg total) by mouth daily at bedtime   • Symbicort 160-4.5 MCG/ACT inhaler USE 2 INHALATIONS BY MOUTH TWICE DAILY RINSE MOUTH AFTER USE   • [DISCONTINUED] famotidine (PEPCID) 40 MG tablet Take 0.5 tablets (20 mg total) by mouth 2 (two) times a day   • [DISCONTINUED] simvastatin (ZOCOR) 10 mg tablet Take 10 mg by mouth daily at bedtime       Objective     /68 (BP Location: Left arm, Patient Position: Sitting, Cuff Size: Large)   Pulse 100   Temp 97.9 °F (36.6 °C) (Temporal)   Ht 5' 1\" (1.549 m)   Wt 78.5 kg (173 lb)   SpO2 97%   BMI 32.69 kg/m²     Physical Exam  Vitals reviewed.   Constitutional:       Appearance: Normal " appearance.   HENT:      Head: Normocephalic and atraumatic.      Mouth/Throat:      Mouth: Mucous membranes are moist.      Pharynx: No oropharyngeal exudate or posterior oropharyngeal erythema.   Eyes:      Extraocular Movements: Extraocular movements intact.   Cardiovascular:      Rate and Rhythm: Normal rate and regular rhythm.      Heart sounds: Normal heart sounds.   Pulmonary:      Effort: Pulmonary effort is normal.      Breath sounds: Normal breath sounds.   Abdominal:      Palpations: Abdomen is soft.   Musculoskeletal:      Cervical back: Neck supple. No tenderness.   Neurological:      General: No focal deficit present.      Mental Status: She is alert and oriented to person, place, and time.   Psychiatric:         Mood and Affect: Mood normal.         Behavior: Behavior normal.     Lelia Mccord, DO

## 2024-04-04 NOTE — ASSESSMENT & PLAN NOTE
Reviewed recent Lipid Panel with patient  Discussed abnormal levels. Encouraged continuance of zocor and also encouraged dietary changes.

## 2024-04-04 NOTE — ASSESSMENT & PLAN NOTE
Review of Barium swallow from 04/2021 showing tertiary contractions and dysmotility in mid to lower esophagus.     Will refer patient to GI for further evaluation. Encouraged watching diet and switching to a more soft diet.

## 2024-04-09 ENCOUNTER — OFFICE VISIT (OUTPATIENT)
Age: 84
End: 2024-04-09
Payer: MEDICARE

## 2024-04-09 VITALS
BODY MASS INDEX: 33 KG/M2 | DIASTOLIC BLOOD PRESSURE: 80 MMHG | HEART RATE: 67 BPM | WEIGHT: 174.8 LBS | TEMPERATURE: 98 F | SYSTOLIC BLOOD PRESSURE: 110 MMHG | HEIGHT: 61 IN | OXYGEN SATURATION: 96 %

## 2024-04-09 DIAGNOSIS — G47.33 OSA ON CPAP: ICD-10-CM

## 2024-04-09 DIAGNOSIS — J45.20 MILD INTERMITTENT ASTHMA, UNSPECIFIED WHETHER COMPLICATED: Primary | ICD-10-CM

## 2024-04-09 PROCEDURE — 99213 OFFICE O/P EST LOW 20 MIN: CPT | Performed by: PHYSICIAN ASSISTANT

## 2024-04-09 PROCEDURE — G2211 COMPLEX E/M VISIT ADD ON: HCPCS | Performed by: PHYSICIAN ASSISTANT

## 2024-04-09 NOTE — PROGRESS NOTES
"Assessment/Plan:   Diagnoses and all orders for this visit:    Mild intermittent asthma, unspecified whether complicated    CHUYITA on CPAP        Patient is here today for follow up.  She is overall stable with her breathing, slight increase in cough recently.  Suspect this is related to worsening GERD symptoms with belching and reflux symptoms.  Also could be related to spring allergens.  She will continue with the Symbicort twice per day, albuterol nebulizer 4 times per day as needed.    She does have a CPAP at home.  Has moderate obstructive sleep apnea diagnosed in 2021.  She has lost 20 pounds since then though BMI remains elevated.  When she had been using the CPAP, she did note nights where her sleep was much better, other nights has trouble with the mask.  She has tried different masks and pressures.    I spoke with her about the risks of untreated sleep apnea including arrhythmia, MI, stroke.  Also spoke with her about possibly pursuing  [employer]. She will begin to use the CPAP again.    No follow-ups on file.  All questions are answered to the patient's satisfaction and understanding.  She verbalizes understanding.  She is encouraged to call with any further questions or concerns.    Portions of the record may have been created with voice recognition software.  Occasional wrong word or \"sound a like\" substitutions may have occurred due to the inherent limitations of voice recognition software.  Read the chart carefully and recognize, using context, where substitutions have occurred.    Electronically Signed by Shawn Sepulveda PA-C    ______________________________________________________________________    Chief Complaint:   Chief Complaint   Patient presents with    Follow-up    Sleep Apnea       Patient ID: Roslyn is a 83 y.o. y.o. female has a past medical history of Anxiety, Arthritis, Asthma, COPD (chronic obstructive pulmonary disease) ([facility]), COVID (06/2022), CPAP (continuous positive airway pressure) " dependence, Depression, Disease of thyroid gland, GERD (gastroesophageal reflux disease), History of transfusion, Hyperlipidemia, Hypotension, MRSA (methicillin resistant Staphylococcus aureus) infection, Shortness of breath, Sleep apnea, Sleep apnea, obstructive, Use of cane as ambulatory aid, Wears glasses, and Wrist fracture.    4/9/2024  Patient presents today for follow-up visit.  Patient is an 83-year-old female former smoker who quit over 20 years ago with past medical history of asthma, COPD, seasonal environmental allergies, hiatal hernia, GERD, hypertension, obesity, CHUYITA.    She is here today for follow up.  She is overall stable with her breathing.  Has had some slight increased cough over the past several days.  She is also noted increased belching and reflux symptoms.  She has not been using her CPAP, has found that some nights she sleeps very well with the CPAP and does get a better night of sleep, other nights has trouble with the CPAP and she has not used it in some time.  She continues with the Symbicort twice per day, albuterol nebulizer as needed.          Review of Systems   Constitutional: Negative.    HENT: Negative.     Respiratory:  Positive for cough.    Cardiovascular: Negative.    Gastrointestinal: Negative.    Genitourinary: Negative.    Musculoskeletal: Negative.    Skin: Negative.    Allergic/Immunologic: Negative.    Neurological: Negative.    Psychiatric/Behavioral: Negative.         Smoking history: She reports that she quit smoking about 24 years ago. Her smoking use included cigarettes. She started smoking about 54 years ago. She has a 7.5 pack-year smoking history. She has never been exposed to tobacco smoke. She has never used smokeless tobacco.    The following portions of the patient's history were reviewed and updated as appropriate: allergies, current medications, past family history, past medical history, past social history, past surgical history, and problem  list.    Immunization History   Administered Date(s) Administered    COVID-19 MODERNA VACC 0.5 ML IM 02/08/2021, 03/09/2021, 10/24/2021, 04/08/2022    COVID-19 Pfizer Vac BIVALENT Edward-sucrose 12 Yr+ IM 12/13/2022    COVID-19 Pfizer mRNA vacc PF edward-sucrose 12 yr and older (Comirnaty) 10/01/2023    INFLUENZA 10/24/2016, 08/16/2018, 10/18/2022, 10/01/2023    Influenza Split High Dose Preservative Free IM 09/12/2012, 09/13/2017, 08/16/2018, 08/27/2020    Influenza, Seasonal Vaccine, Quadrivalent, Adjuvanted, .5e 10/18/2022    Influenza, high dose seasonal 0.7 mL 08/27/2020    Influenza, seasonal, injectable 1940    Pneumococcal Conjugate 13-Valent 03/23/2016    Pneumococcal Polysaccharide PPV23 1940, 01/01/2014    Respiratory Syncytial Virus Vaccine (Recombinant, Adjuvanted) 01/29/2024    Tdap [birth year], 09/26/2016    Zoster [birth year]    Zoster Vaccine Recombinant 08/16/2018, 01/25/2019    influenza, trivalent, adjuvanted 09/13/2019     Current Outpatient Medications   Medication Sig Dispense Refill    albuterol (ACCUNEB) 0.63 MG/3ML nebulizer solution Take 3 mL (0.63 mg total) by nebulization every 6 (six) hours as needed for wheezing or shortness of breath 1080 mL 3    albuterol (PROVENTIL HFA,VENTOLIN HFA) 90 mcg/act inhaler Inhale 1 puff every 6 (six) hours as needed for wheezing 3 Inhaler 5    aspirin 81 mg chewable tablet Chew 81 mg daily At Night      famotidine (PEPCID) 40 MG tablet Take 0.5 tablets (20 mg total) by mouth 2 (two) times a day 180 tablet 1    FLUoxetine (PROzac) 40 MG capsule Take 1 capsule (40 mg total) by mouth 2 (two) times a day 60 capsule 0    hydroCHLOROthiazide 25 mg tablet Take 1 tablet (25 mg total) by mouth in the morning 90 tablet 1    levothyroxine 50 mcg tablet Take 50 mcg by mouth daily      montelukast (SINGULAIR) 10 mg tablet Take 1 tablet (10 mg total) by mouth daily at bedtime 90 tablet 1    simvastatin (ZOCOR) 10 mg tablet Take 1 tablet  "(10 mg total) by mouth daily at bedtime 90 tablet 1    Symbicort 160-4.5 MCG/ACT inhaler USE 2 INHALATIONS BY MOUTH TWICE DAILY RINSE MOUTH AFTER USE 30.6 g 3     No current facility-administered medications for this visit.     Allergies: Ascorbate - food allergy    Objective:  Vitals:    04/09/24 1327   BP: 110/80   Pulse: 67   Temp: 98 °F (36.7 °C)   SpO2: 96%   Weight: 79.3 kg (174 lb 12.8 oz)   Height: 5' 1\" (1.549 m)   Oxygen Therapy  SpO2: 96 %  .  Wt Readings from Last 3 Encounters:   04/09/24 79.3 kg (174 lb 12.8 oz)   04/04/24 78.5 kg (173 lb)   12/14/23 79.8 kg (176 lb)     Body mass index is 33.03 kg/m².    Physical Exam  Constitutional:       General: She is not in acute distress.     Appearance: Normal appearance. She is well-developed. She is not ill-appearing.   HENT:      Head: Normocephalic and atraumatic.      Mouth/Throat:      Pharynx: Oropharynx is clear.   Eyes:      Pupils: Pupils are equal, round, and reactive to light.   Cardiovascular:      Rate and Rhythm: Normal rate and regular rhythm.   Pulmonary:      Effort: Pulmonary effort is normal. No respiratory distress.      Breath sounds: Normal breath sounds. No decreased breath sounds, wheezing, rhonchi or rales.   Abdominal:      General: Abdomen is flat. There is no distension.   Musculoskeletal:         General: Normal range of motion.      Cervical back: Normal range of motion.      Right lower leg: No edema.      Left lower leg: No edema.   Skin:     General: Skin is warm and dry.      Findings: No rash.   Neurological:      Mental Status: She is alert and oriented to person, place, and time.   Psychiatric:         Mood and Affect: Mood normal.         Behavior: Behavior normal.         Lab Review:   Lab Results   Component Value Date     05/19/2015    K 3.6 12/14/2023    K 3.7 04/20/2023    CL 98 12/14/2023     04/20/2023    CO2 30 12/14/2023    CO2 31 04/20/2023    BUN 20 12/14/2023    BUN 16 04/20/2023    CREATININE 0.85 " 12/14/2023    CREATININE 0.80 04/20/2023    GLUCOSE 107 (H) 05/19/2015    CALCIUM 9.4 12/14/2023    CALCIUM 8.6 04/20/2023     Lab Results   Component Value Date    WBC 5.54 12/14/2023    WBC 4.7 05/19/2015    HGB 15.1 12/14/2023    HGB 15.2 05/19/2015    HCT 45.7 12/14/2023    HCT 44.8 05/19/2015    MCV 91 12/14/2023    MCV 89 05/19/2015     12/14/2023     05/19/2015       Diagnostics:    none pertinent  Office Spirometry Results:     ESS: Total score: 7  No results found.

## 2024-05-01 DIAGNOSIS — K21.9 GASTROESOPHAGEAL REFLUX DISEASE WITHOUT ESOPHAGITIS: ICD-10-CM

## 2024-05-01 RX ORDER — FAMOTIDINE 40 MG/1
40 TABLET, FILM COATED ORAL DAILY
Qty: 180 TABLET | Refills: 1 | Status: SHIPPED | OUTPATIENT
Start: 2024-05-01

## 2024-05-01 NOTE — TELEPHONE ENCOUNTER
Patient is requesting for take full size pill. Splitting with pill  half is not working out for her.  Please advise, Thank you

## 2024-05-07 NOTE — TELEPHONE ENCOUNTER
For diabetes - please increase Metformin to 2 tabs daily.    Please call [phone] to schedule an appointment with dermatology.    Return to clinic in 3 months - have fasting blood work done 3-4 days prior to visit.   Spoke with patient  She said for 3 weeks now she has been having these coughing spells  They last 5 minutes  She brings up white sticky mucous  She is raspy and tired  She has wheezing after her coughing spells  She is SOB on exertion  She has chest tightness but she is also anxious  Denies fevers and chills  She is using her inhalers and nebulizer  She is asking if a Zpack would help

## 2024-05-08 ENCOUNTER — TELEPHONE (OUTPATIENT)
Dept: GASTROENTEROLOGY | Facility: CLINIC | Age: 84
End: 2024-05-08

## 2024-06-05 ENCOUNTER — TELEPHONE (OUTPATIENT)
Age: 84
End: 2024-06-05

## 2024-06-05 NOTE — TELEPHONE ENCOUNTER
"Dr. Silvestre    CPAP Concerns  Discussed ways to get use to CPAP machine/mask.  Pt try to using while awake watching TV, this to help overcome fear that machine not working and she will suffocate/\"die\".    Lack of Sleep - Discussed importance of sleep in regards to mental and physical being. Pt stating will work on using CPAP more to help improve sleep.    Pt had no further questions at this time.    "

## 2024-06-11 NOTE — TELEPHONE ENCOUNTER
Dr. Silvestre    Pt called back to state using CPAP post discussion/advice from last week. Pt states starting to feel more like herself now since getting sleep. ACT lozenges helping with her dry mouth too.

## 2024-07-09 ENCOUNTER — NURSE TRIAGE (OUTPATIENT)
Age: 84
End: 2024-07-09

## 2024-07-09 DIAGNOSIS — J44.89 ASTHMA WITH COPD: ICD-10-CM

## 2024-07-09 RX ORDER — ALBUTEROL SULFATE 90 UG/1
1 AEROSOL, METERED RESPIRATORY (INHALATION) EVERY 6 HOURS PRN
Qty: 18 G | Refills: 3 | Status: SHIPPED | OUTPATIENT
Start: 2024-07-09

## 2024-07-09 NOTE — TELEPHONE ENCOUNTER
Pt calls in and states that yesterday she was doing some yard work and she noticed she was very SOB and experiencing rib pain.She had to sit down where symptoms eventually subsided.  She is wondering if it would be beneficial for her to have a rescue inhaler. Please advise

## 2024-07-09 NOTE — TELEPHONE ENCOUNTER
Yes, she should have a rescue inhaler to use at those times.  I did send a refill to her pharmacy.

## 2024-07-10 ENCOUNTER — OFFICE VISIT (OUTPATIENT)
Dept: URGENT CARE | Facility: CLINIC | Age: 84
End: 2024-07-10
Payer: MEDICARE

## 2024-07-10 ENCOUNTER — APPOINTMENT (OUTPATIENT)
Dept: RADIOLOGY | Facility: CLINIC | Age: 84
End: 2024-07-10
Payer: MEDICARE

## 2024-07-10 VITALS
OXYGEN SATURATION: 96 % | HEART RATE: 66 BPM | BODY MASS INDEX: 30.99 KG/M2 | TEMPERATURE: 97.8 F | WEIGHT: 164 LBS | DIASTOLIC BLOOD PRESSURE: 70 MMHG | SYSTOLIC BLOOD PRESSURE: 118 MMHG | RESPIRATION RATE: 18 BRPM

## 2024-07-10 DIAGNOSIS — R06.02 SHORTNESS OF BREATH: ICD-10-CM

## 2024-07-10 DIAGNOSIS — R07.89 FEELING OF CHEST TIGHTNESS: ICD-10-CM

## 2024-07-10 DIAGNOSIS — J45.21 MILD INTERMITTENT ASTHMA WITH ACUTE EXACERBATION: Primary | ICD-10-CM

## 2024-07-10 PROCEDURE — 71046 X-RAY EXAM CHEST 2 VIEWS: CPT

## 2024-07-10 PROCEDURE — 99214 OFFICE O/P EST MOD 30 MIN: CPT | Performed by: PHYSICIAN ASSISTANT

## 2024-07-10 PROCEDURE — G0463 HOSPITAL OUTPT CLINIC VISIT: HCPCS | Performed by: PHYSICIAN ASSISTANT

## 2024-07-10 RX ORDER — METHYLPREDNISOLONE 4 MG/1
TABLET ORAL
Qty: 21 TABLET | Refills: 0 | Status: SHIPPED | OUTPATIENT
Start: 2024-07-10

## 2024-07-10 NOTE — TELEPHONE ENCOUNTER
I left patient a message that she should go to the emergency room with the complaints that she is having.

## 2024-07-10 NOTE — TELEPHONE ENCOUNTER
"Patient call:  Pt stated provider: EBONY Sepulveda    Actionable item: Urgent care     What is the reason for the call/chief complaint?    Reports 8/10 chest pressure which radiates to neck over the past 3 days accompanied by mild SOB. Denies cardiac hx although mentioned some family members with hx. \"First time this has happened\".    Per recent note, appears patient believed for it to be Asthma related but is currently concerned for cardiac etiology considering persistence.     States that the pressure is like \"someone wrapped tape/rope around her chest\" and has persisted in severity with no improving/aggravating factors.       Dispo: Urgent care for further evaluation.   Informed to call [facility] with worsening symptoms.  Agrees with plan.   All questions answered.     Reason for Disposition   Patient sounds very sick or weak to the triager    Answer Assessment - Initial Assessment Questions  1. LOCATION: \"Where does it hurt?\"        Entire chest  2. RADIATION: \"Does the pain go anywhere else?\" (e.g., into neck, jaw, arms, back)      Into neck   3. ONSET: \"When did the chest pain begin?\" (Minutes, hours or days)       3 days ago  4. PATTERN \"Does the pain come and go, or has it been constant since it started?\"  \"Does it get worse with exertion?\"       constant  5. DURATION: \"How long does it last\" (e.g., seconds, minutes, hours)      Steady-  6. SEVERITY: \"How bad is the pain?\"  (e.g., Scale 1-10; mild, moderate, or severe)     - MILD (1-3): doesn't interfere with normal activities      - MODERATE (4-7): interferes with normal activities or awakens from sleep     - SEVERE (8-10): excruciating pain, unable to do any normal activities        8/10 pressure  7. CARDIAC RISK FACTORS: \"Do you have any history of heart problems or risk factors for heart disease?\" (e.g., angina, prior heart attack; diabetes, high blood pressure, high cholesterol, smoker, or strong family history of heart disease)      Heart disease in family " "but none herself   8. PULMONARY RISK FACTORS: \"Do you have any history of lung disease?\"  (e.g., blood clots in lung, asthma, emphysema, birth control pills)      Asthma   9. CAUSE: \"What do you think is causing the chest pain?\"      Heart related but unsure   10. OTHER SYMPTOMS: \"Do you have any other symptoms?\" (e.g., dizziness, nausea, vomiting, sweating, fever, difficulty breathing, cough)    Protocols used: Chest Pain-ADULT-[place]    "

## 2024-07-10 NOTE — PROGRESS NOTES
[facility]        NAME: Roslyn Manzo is a 83 y.o. female  : 1940    MRN: [id number]  DATE: July 10, 2024  TIME: 4:46 PM    Assessment and Plan   Mild intermittent asthma with acute exacerbation [J45.21]  1. Mild intermittent asthma with acute exacerbation  methylPREDNISolone 4 MG tablet therapy pack      2. Feeling of chest tightness  XR chest pa & lateral      3. Shortness of breath  XR chest pa & lateral            Patient Instructions     Patient Instructions   Xray provider read- no acute findings identified.      Recommended oral steroid. Patient to use her rescue inhaler as needed and continue with her usual nebulizer treatments.     Follow up with PCP in 3-5 days.  Proceed to  ER if symptoms worsen.    If tests are performed, our office will contact you with results only if changes need to made to the care plan discussed with you at the visit. [facility]        Chief Complaint     Chief Complaint   Patient presents with    Shortness of Breath     Pt states she has been having chest tightness for 3 days. Pt states she usually coughs daily and able to get phlegm up but hasn't been able to for the 3 days. Pt took neb tx today at 1400. Did not  inhaler yet.          History of Present Illness       Shortness of Breath  The current episode started in the past 7 days. The problem occurs daily. The problem has been waxing and waning since onset. The problem is mild. Associated symptoms include chest pressure and coughing. Pertinent negatives include no chest pain, fatigue, hoarseness of voice, palpitations, rhinorrhea, sore throat, stridor or wheezing. Exacerbated by: Working outside, activity. She has had no prior steroid use. Past treatments include beta-agonist inhalers. The treatment provided mild relief. Her past medical history is significant for asthma.       Review of Systems   Review of Systems   Constitutional:  Negative for  activity change, appetite change, chills, diaphoresis, fatigue and fever.   HENT:  Negative for congestion, hoarse voice, rhinorrhea and sore throat.    Respiratory:  Positive for cough, chest tightness and shortness of breath. Negative for apnea, choking, wheezing and stridor.    Cardiovascular:  Negative for chest pain and palpitations.   All other systems reviewed and are negative.        Current Medications       Current Outpatient Medications:     albuterol (ACCUNEB) 0.63 MG/3ML nebulizer solution, Take 3 mL (0.63 mg total) by nebulization every 6 (six) hours as needed for wheezing or shortness of breath, Disp: 1080 mL, Rfl: 3    albuterol (PROVENTIL HFA,VENTOLIN HFA) 90 mcg/act inhaler, Inhale 1 puff every 6 (six) hours as needed for wheezing, Disp: 18 g, Rfl: 3    aspirin 81 mg chewable tablet, Chew 81 mg daily At Night, Disp: , Rfl:     famotidine (PEPCID) 40 MG tablet, Take 1 tablet (40 mg total) by mouth in the morning, Disp: 180 tablet, Rfl: 1    FLUoxetine (PROzac) 40 MG capsule, Take 1 capsule (40 mg total) by mouth 2 (two) times a day, Disp: 60 capsule, Rfl: 0    hydroCHLOROthiazide 25 mg tablet, Take 1 tablet (25 mg total) by mouth in the morning, Disp: 90 tablet, Rfl: 1    levothyroxine 50 mcg tablet, Take 50 mcg by mouth daily, Disp: , Rfl:     methylPREDNISolone 4 MG tablet therapy pack, Use as directed on package, Disp: 21 tablet, Rfl: 0    montelukast (SINGULAIR) 10 mg tablet, Take 1 tablet (10 mg total) by mouth daily at bedtime, Disp: 90 tablet, Rfl: 1    simvastatin (ZOCOR) 10 mg tablet, Take 1 tablet (10 mg total) by mouth daily at bedtime, Disp: 90 tablet, Rfl: 1    Symbicort 160-4.5 MCG/ACT inhaler, USE 2 INHALATIONS BY MOUTH TWICE DAILY RINSE MOUTH AFTER USE, Disp: 30.6 g, Rfl: 3    Current Allergies     Allergies as of 07/10/2024 - Reviewed 07/10/2024   Allergen Reaction Noted    Ascorbate - food allergy GI Intolerance 05/07/2009            The following portions of the patient's history  were reviewed and updated as appropriate: allergies, current medications, past family history, past medical history, past social history, past surgical history and problem list.     Past Medical History:   Diagnosis Date    Anxiety     Arthritis     Asthma     COPD (chronic obstructive pulmonary disease) ([facility])     COVID 2022    CPAP (continuous positive airway pressure) dependence     Depression     Disease of thyroid gland     GERD (gastroesophageal reflux disease)     History of transfusion     Hyperlipidemia     Hypotension     MRSA (methicillin resistant Staphylococcus aureus) infection     about 15yrs ago    Shortness of breath     HUNTER    Sleep apnea     Sleep apnea, obstructive     Use of cane as ambulatory aid     sometimes    Wears glasses     Wrist fracture        Past Surgical History:   Procedure Laterality Date    BACK SURGERY  2017     SECTION      x3    COLONOSCOPY      DILATION AND EVACUATION      miscarriage    EGD      INCONTINENCE SURGERY      JOINT REPLACEMENT Left     left knee sugery x2    LA NSL/SINUS NDSC MAX ANTROST W/RMVL TISS MAX SINUS N/A 2022    Procedure: FUNCTIONAL ENDOSCOPIC SINUS SURGERY (FESS) IMAGED GUIDED LEFT MAXILLARY ANTROSTOMY ETHMOIDECTOMY FRONTAL SINUSOTOMY WITH DRAINAGE OF FRONTAL SINUS MUCOCELE;  Surgeon: Darryl Plunkett MD;  Location: [facility];  Service: ENT    REPLACEMENT TOTAL KNEE BILATERAL      SINUS SURGERY      TUBAL LIGATION         History reviewed. No pertinent family history.      Medications have been verified.        Objective   /70   Pulse 66   Temp 97.8 °F (36.6 °C)   Resp 18   Wt 74.4 kg (164 lb)   SpO2 96%   BMI 30.99 kg/m²        Physical Exam     Physical Exam  Vitals and nursing note reviewed.   Constitutional:       General: She is not in acute distress.     Appearance: Normal appearance. She is not ill-appearing.   HENT:      Right Ear: Tympanic membrane, ear canal and external ear normal.      Left Ear: Tympanic  membrane, ear canal and external ear normal.      Nose: Nose normal.      Mouth/Throat:      Mouth: Mucous membranes are moist.   Eyes:      Pupils: Pupils are equal, round, and reactive to light.   Cardiovascular:      Rate and Rhythm: Normal rate and regular rhythm.   Pulmonary:      Effort: Pulmonary effort is normal. No tachypnea, accessory muscle usage or respiratory distress.      Breath sounds: Normal breath sounds. No stridor. No decreased breath sounds or wheezing.   Skin:     General: Skin is warm.      Capillary Refill: Capillary refill takes less than 2 seconds.   Neurological:      General: No focal deficit present.      Mental Status: She is alert and oriented to person, place, and time.   Psychiatric:         Mood and Affect: Mood normal.         Behavior: Behavior normal.

## 2024-07-10 NOTE — PATIENT INSTRUCTIONS
Xray provider read- no acute findings identified.      Recommended oral steroid. Patient to use her rescue inhaler as needed and continue with her usual nebulizer treatments.     Follow up with PCP in 3-5 days.  Proceed to  ER if symptoms worsen.    If tests are performed, our office will contact you with results only if changes need to made to the care plan discussed with you at the visit. You can review your full results on [facility].

## 2024-07-12 NOTE — TELEPHONE ENCOUNTER
Patient returning call.     States that she is feeling better with steroids and nebulizer after her urgent care visit. Feeling optimistic with the resolution of her symptoms.    All questions answered.

## 2024-07-18 ENCOUNTER — TELEPHONE (OUTPATIENT)
Age: 84
End: 2024-07-18

## 2024-07-18 NOTE — TELEPHONE ENCOUNTER
Pt is feeling much better but not cured. Her spirometer broke she is asking for a new one to be ordered if possible      Pt has a question about her Albuterol (Ventolin) inhaler if she should be taking it when she doesn't have wheezing.       She is wondering if there is something else she should be taking. She would like our office to give her a call back and send her the office address as last time she went to the old office location.

## 2024-07-23 ENCOUNTER — CONSULT (OUTPATIENT)
Dept: GASTROENTEROLOGY | Facility: CLINIC | Age: 84
End: 2024-07-23
Payer: MEDICARE

## 2024-07-23 VITALS
TEMPERATURE: 97.4 F | RESPIRATION RATE: 18 BRPM | OXYGEN SATURATION: 100 % | WEIGHT: 161 LBS | DIASTOLIC BLOOD PRESSURE: 76 MMHG | BODY MASS INDEX: 29.63 KG/M2 | HEIGHT: 62 IN | SYSTOLIC BLOOD PRESSURE: 118 MMHG | HEART RATE: 78 BPM

## 2024-07-23 DIAGNOSIS — R13.10 DYSPHAGIA, UNSPECIFIED TYPE: ICD-10-CM

## 2024-07-23 DIAGNOSIS — K21.9 GASTROESOPHAGEAL REFLUX DISEASE WITHOUT ESOPHAGITIS: Primary | ICD-10-CM

## 2024-07-23 PROCEDURE — G2211 COMPLEX E/M VISIT ADD ON: HCPCS | Performed by: INTERNAL MEDICINE

## 2024-07-23 PROCEDURE — 99204 OFFICE O/P NEW MOD 45 MIN: CPT | Performed by: INTERNAL MEDICINE

## 2024-07-23 NOTE — PROGRESS NOTES
Consultation -  [facility]  Roslyn HAYDE Jovany 1940 83 y.o. female     ASSESSMENT @ PLAN:   She is an 83-year-old female with GERD occasional rare heartburn that responds to Pepcid with occasional solid food dysphagia with food sticking in the neck.  She reports having a narrowed esophagus on endoscopy approximately 10 years ago.    1 we will do an EGD to investigate    2 she will continue with her Pepcid as needed    Chief Complaint: Dysphagia and GERD    HPI: She is a 83-year-old female with occasional solid food aphasia she has food sticking in the neck.  It does hurt when it happens.  She has not had an episode in the last few months she had an episode 4 months ago that prompted the referral.  It was alarming.  She thinks she had a narrowed esophagus on endoscopy approximately 10 years ago she not cannot remember she had a dilation.  She has occasional heartburn that responds to Pepcid.  She no regurgitation.  She has no chest pain no abdominal pain no nausea no vomiting no belching she does have a lot of burping and belching after waking up with her CPAP machine but does not really occur outside of these episodes.  Weight loss nobody in the family had malignancy in the GI tract    REVIEW OF SYSTEMS:     CONSTITUTIONAL: Denies any fever, chills, or rigors. Good appetite, and no recent weight loss.  HEENT: No earache or tinnitus. Denies hearing loss or visual disturbances.  CARDIOVASCULAR: No chest pain or palpitations.   RESPIRATORY: Denies any cough, hemoptysis, shortness of breath or dyspnea on exertion.  GASTROINTESTINAL: As noted in the History of Present Illness.   GENITOURINARY: No problems with urination. Denies any hematuria or dysuria.  NEUROLOGIC: No dizziness or vertigo, denies headaches.   MUSCULOSKELETAL: Denies any muscle or joint pain.   SKIN: Denies skin rashes or itching.   ENDOCRINE: Denies excessive thirst. Denies intolerance to heat or cold.  PSYCHOSOCIAL: Denies  depression or anxiety. Denies any recent memory loss.     Past Medical History:   Diagnosis Date    Anxiety     Arthritis     Asthma     COPD (chronic obstructive pulmonary disease) ([facility])     COVID 2022    CPAP (continuous positive airway pressure) dependence     Depression     Disease of thyroid gland     GERD (gastroesophageal reflux disease)     History of transfusion     Hyperlipidemia     Hypotension     MRSA (methicillin resistant Staphylococcus aureus) infection     about 15yrs ago    Shortness of breath     HUNTER    Sleep apnea     Sleep apnea, obstructive     Use of cane as ambulatory aid     sometimes    Wears glasses     Wrist fracture       Past Surgical History:   Procedure Laterality Date    BACK SURGERY  2017     SECTION      x3    COLONOSCOPY      DILATION AND EVACUATION      miscarriage    EGD      INCONTINENCE SURGERY      JOINT REPLACEMENT Left     left knee sugery x2    CA NSL/SINUS NDSC MAX ANTROST W/RMVL TISS MAX SINUS N/A 2022    Procedure: FUNCTIONAL ENDOSCOPIC SINUS SURGERY (FESS) IMAGED GUIDED LEFT MAXILLARY ANTROSTOMY ETHMOIDECTOMY FRONTAL SINUSOTOMY WITH DRAINAGE OF FRONTAL SINUS MUCOCELE;  Surgeon: Darryl Plunkett MD;  Location: [facility];  Service: ENT    REPLACEMENT TOTAL KNEE BILATERAL      SINUS SURGERY      TUBAL LIGATION       Social History     Socioeconomic History    Marital status:      Spouse name: Not on file    Number of children: 3    Years of education: Not on file    Highest education level: Not on file   Occupational History    Occupation: retired   Tobacco Use    Smoking status: Former     Current packs/day: 0.00     Average packs/day: 0.3 packs/day for 30.0 years (7.5 ttl pk-yrs)     Types: Cigarettes     Start date: 1970     Quit date: 2000     Years since quittin.5     Passive exposure: Never    Smokeless tobacco: Never   Vaping Use    Vaping status: Never Used   Substance and Sexual Activity    Alcohol use: Yes     Comment:  occasionally cinnamon whiskey    Drug use: No    Sexual activity: Not Currently     Partners: Male     Birth control/protection: None   Other Topics Concern    Not on file   Social History Narrative    Not on file     Social Determinants of Health     Financial Resource Strain: Low Risk  (12/14/2023)    Overall Financial Resource Strain (CARDIA)     Difficulty of Paying Living Expenses: Not hard at all   Food Insecurity: Not on file   Transportation Needs: No Transportation Needs (12/14/2023)    PRAPARE - Transportation     Lack of Transportation (Medical): No     Lack of Transportation (Non-Medical): No   Physical Activity: Sufficiently Active (6/12/2023)    Exercise Vital Sign     Days of Exercise per Week: 3 days     Minutes of Exercise per Session: 60 min   Stress: Not on file   Social Connections: Unknown (6/18/2024)    Received from [employer]     How often do you feel lonely or isolated from those around you? (Adult - for ages 18 years and over): Not on file   Intimate Partner Violence: Not on file   Housing Stability: Not on file     Family History   Problem Relation Age of Onset    No Known Problems Mother     No Known Problems Father     No Known Problems Sister     No Known Problems Brother     No Known Problems Maternal Grandmother     No Known Problems Maternal Grandfather     No Known Problems Paternal Grandmother     No Known Problems Paternal Grandfather     No Known Problems Maternal Aunt     No Known Problems Maternal Uncle     No Known Problems Paternal Aunt     No Known Problems Paternal Uncle     No Known Problems Cousin      Ascorbate - food allergy  Current Outpatient Medications   Medication Sig Dispense Refill    albuterol (ACCUNEB) 0.63 MG/3ML nebulizer solution Take 3 mL (0.63 mg total) by nebulization every 6 (six) hours as needed for wheezing or shortness of breath 1080 mL 3    albuterol (PROVENTIL HFA,VENTOLIN HFA) 90 mcg/act inhaler Inhale 1 puff every 6 (six)  "hours as needed for wheezing 18 g 3    aspirin 81 mg chewable tablet Chew 81 mg daily At Night      famotidine (PEPCID) 40 MG tablet Take 1 tablet (40 mg total) by mouth in the morning 180 tablet 1    FLUoxetine (PROzac) 40 MG capsule Take 1 capsule (40 mg total) by mouth 2 (two) times a day 60 capsule 0    hydroCHLOROthiazide 25 mg tablet Take 1 tablet (25 mg total) by mouth in the morning 90 tablet 1    levothyroxine 50 mcg tablet Take 50 mcg by mouth daily      methylPREDNISolone 4 MG tablet therapy pack Use as directed on package 21 tablet 0    montelukast (SINGULAIR) 10 mg tablet Take 1 tablet (10 mg total) by mouth daily at bedtime 90 tablet 1    simvastatin (ZOCOR) 10 mg tablet Take 1 tablet (10 mg total) by mouth daily at bedtime 90 tablet 1    Symbicort 160-4.5 MCG/ACT inhaler USE 2 INHALATIONS BY MOUTH TWICE DAILY RINSE MOUTH AFTER USE 30.6 g 3     No current facility-administered medications for this visit.       Blood pressure 118/76, pulse 78, temperature (!) 97.4 °F (36.3 °C), temperature source Tympanic, resp. rate 18, height 5' 2\" (1.575 m), weight 73 kg (161 lb), SpO2 100%.    PHYSICAL EXAM:     General Appearance:   Alert, cooperative, no distress, appears stated age    HEENT:   Normocephalic, atraumatic, anicteric.     Neck:  Supple, symmetrical, trachea midline, no adenopathy;    thyroid: no enlargement/tenderness/nodules; no carotid  bruit or JVD    Lungs:   Clear to auscultation bilaterally; no rales, rhonchi or wheezing; respirations unlabored    Heart::   S1 and S2 normal; regular rate and rhythm; no murmur, rub, or gallop.   Abdomen:   Soft, non-tender, non-distended; normal bowel sounds; no masses, no organomegaly    Genitalia:   Deferred    Rectal:   Deferred    Extremities:  No cyanosis, clubbing or edema    Pulses:  2+ and symmetric all extremities    Skin:  Skin color, texture, turgor normal, no rashes or lesions    Lymph nodes:  No palpable cervical, axillary or inguinal " lymphadenopathy        Lab Results   Component Value Date    WBC 5.54 12/14/2023    HGB 15.1 12/14/2023    HCT 45.7 12/14/2023    MCV 91 12/14/2023     12/14/2023     Lab Results   Component Value Date    GLUCOSE 107 (H) 05/19/2015    CALCIUM 9.4 12/14/2023     05/19/2015    K 3.6 12/14/2023    CO2 30 12/14/2023    CL 98 12/14/2023    BUN 20 12/14/2023    CREATININE 0.85 12/14/2023     Lab Results   Component Value Date    ALT 25 12/14/2023    AST 26 12/14/2023    ALKPHOS 89 12/14/2023    BILITOT 0.3 05/19/2015     Lab Results   Component Value Date    INR 1.14 03/18/2019    PROTIME 14.5 (H) 03/18/2019

## 2024-07-23 NOTE — TELEPHONE ENCOUNTER
I put in a order for a new incentive spirometer, or she can get one at her next visit with us.   She does not need to use the ventolin if she is feeling better. Can go back to using as needed.

## 2024-07-23 NOTE — PATIENT INSTRUCTIONS
Scheduled date of EGD(as of today):8/22/24  Physician performing EGD:Farshad  Location of EGD:[facility]  Instructions reviewed with patient by:Judi PALMER  Clearances:  none

## 2024-08-09 ENCOUNTER — TELEPHONE (OUTPATIENT)
Age: 84
End: 2024-08-09

## 2024-08-09 NOTE — TELEPHONE ENCOUNTER
Pt calling in again, she spoke to her DME supplier and the told her she needs a new machine. Pt stating she has not being using it as she didn't like it, however for the past 2 days she has started up again and really enjoys using it. She states also states she's been having some shortness of breath as well, scheduled for next available with Dr. Silvestre 8/14

## 2024-08-09 NOTE — TELEPHONE ENCOUNTER
Can we call [facility] and see if she is indeed due for a new CPAP? Looks like she received hers in 2021 so it has not been 5 years. We can then discuss at her upcoming visit and order if appropriate.

## 2024-08-09 NOTE — TELEPHONE ENCOUNTER
Incoming call with CPAP water reservoir questions and humidification concerns.    Provided [facility] phone number to speak with technician

## 2024-08-12 ENCOUNTER — OFFICE VISIT (OUTPATIENT)
Age: 84
End: 2024-08-12
Payer: MEDICARE

## 2024-08-12 VITALS
DIASTOLIC BLOOD PRESSURE: 84 MMHG | SYSTOLIC BLOOD PRESSURE: 120 MMHG | BODY MASS INDEX: 30.55 KG/M2 | HEIGHT: 62 IN | TEMPERATURE: 97.8 F | HEART RATE: 65 BPM | OXYGEN SATURATION: 96 % | WEIGHT: 166 LBS

## 2024-08-12 DIAGNOSIS — J41.0 SIMPLE CHRONIC BRONCHITIS (HCC): ICD-10-CM

## 2024-08-12 DIAGNOSIS — G47.33 OSA ON CPAP: Primary | ICD-10-CM

## 2024-08-12 DIAGNOSIS — E66.9 OBESITY (BMI 30-39.9): ICD-10-CM

## 2024-08-12 LAB

## 2024-08-12 PROCEDURE — 99214 OFFICE O/P EST MOD 30 MIN: CPT | Performed by: INTERNAL MEDICINE

## 2024-08-12 NOTE — PROGRESS NOTES
"Assessment/Plan:   Diagnoses and all orders for this visit:    CHUYITA on CPAP  -     CPAP Auto New DME    Simple chronic bronchitis (HCC)    Obesity (BMI 30-39.9)        PFTs in 2019 with no evidence of any large airway obstruction but she has been on bronchodilators for a long time and she states it has been helping her  Continue with Symbicort 160/4.52 puffs twice daily  Rinse mouth after use continue with montelukast 10 mg daily at bedtime  Continue with albuterol MDI 2 puffs 4 times daily as needed  If give another spacer to use along with the MDI.  She does have a nebulizer machine to use with albuterol if she really needs it.  She will continue with the acid reflux medications for her for the GERD.  CHUYITA on CPAP given that the machine is broken we will order the new auto CPAP machine will send it to her DME company [employer]  Cleaning of the supplies change of PAP supplies discussed  Caution against driving when sleepy.  Recommend weight loss  Follow-up in 3 months or earlier as needed with the CPAP download compliance  Return in about 3 months (around 11/12/2024).  All questions are answered to the patient's satisfaction and understanding.  She verbalizes understanding.  She is encouraged to call with any further questions or concerns.    Portions of the record may have been created with voice recognition software.  Occasional wrong word or \"sound a like\" substitutions may have occurred due to the inherent limitations of voice recognition software.  Read the chart carefully and recognize, using context, where substitutions have occurred.    Electronically Signed by Apryl Silvestre MD    ______________________________________________________________________    Chief Complaint:   Chief Complaint   Patient presents with    Follow-up    Sleep Apnea       Patient ID: Roslyn is a 83 y.o. y.o. female has a past medical history of Anxiety, Arthritis, Asthma, COPD (chronic obstructive pulmonary disease) (HCC), COVID (06/2022), " CPAP (continuous positive airway pressure) dependence, Depression, Disease of thyroid gland, GERD (gastroesophageal reflux disease), History of transfusion, Hyperlipidemia, Hypotension, MRSA (methicillin resistant Staphylococcus aureus) infection, Shortness of breath, Sleep apnea, Sleep apnea, obstructive, Use of cane as ambulatory aid, Wears glasses, and Wrist fracture.    8/12/2024  Patient presents today for follow-up visit.  Patient is an 83-year-old female former smoker who quit over 20 years ago with past medical history of asthma, COPD, seasonal environmental allergies, hiatal hernia, GERD, hypertension, obesity, CHUYITA.    She is here today for follow up.  She is overall stable with her breathing.  Has had some slight increased cough over the past several days.    She continues with the Symbicort twice per day, albuterol nebulizer as needed.  She states her CPAP has not been working for the past few days would need an new order for the CPAP.          Review of Systems   Constitutional:  Positive for fatigue.   HENT: Negative.     Eyes: Negative.    Respiratory:  Positive for cough and shortness of breath.    Cardiovascular: Negative.    Gastrointestinal: Negative.    Endocrine: Negative.    Genitourinary: Negative.    Musculoskeletal: Negative.    Allergic/Immunologic: Negative.    Neurological: Negative.    Psychiatric/Behavioral: Negative.         Smoking history: She reports that she quit smoking about 24 years ago. Her smoking use included cigarettes. She started smoking about 54 years ago. She has a 7.5 pack-year smoking history. She has never been exposed to tobacco smoke. She has never used smokeless tobacco.    The following portions of the patient's history were reviewed and updated as appropriate: allergies, current medications, past family history, past medical history, past social history, past surgical history, and problem list.    Immunization History   Administered Date(s) Administered     COVID-19 MODERNA VACC 0.5 ML IM 02/08/2021, 03/09/2021, 10/24/2021, 04/08/2022    COVID-19 Pfizer Vac BIVALENT Edward-sucrose 12 Yr+ IM 12/13/2022    COVID-19 Pfizer mRNA vacc PF edward-sucrose 12 yr and older (Comirnaty) 10/01/2023    INFLUENZA 10/24/2016, 08/16/2018, 10/18/2022, 10/01/2023    Influenza Split High Dose Preservative Free IM 09/12/2012, 09/13/2017, 08/16/2018, 08/27/2020    Influenza, Seasonal Vaccine, Quadrivalent, Adjuvanted, .5e 10/18/2022    Influenza, high dose seasonal 0.7 mL 08/27/2020    Influenza, seasonal, injectable 1940    Pneumococcal Conjugate 13-Valent 03/23/2016    Pneumococcal Polysaccharide PPV23 1940, 01/01/2014    Respiratory Syncytial Virus Vaccine (Recombinant, Adjuvanted) 01/29/2024    Tdap [birth year], 09/26/2016    Zoster [birth year]    Zoster Vaccine Recombinant 08/16/2018, 01/25/2019    influenza, trivalent, adjuvanted 09/13/2019     Current Outpatient Medications   Medication Sig Dispense Refill    albuterol (ACCUNEB) 0.63 MG/3ML nebulizer solution Take 3 mL (0.63 mg total) by nebulization every 6 (six) hours as needed for wheezing or shortness of breath 1080 mL 3    albuterol (PROVENTIL HFA,VENTOLIN HFA) 90 mcg/act inhaler Inhale 1 puff every 6 (six) hours as needed for wheezing 18 g 3    aspirin 81 mg chewable tablet Chew 81 mg daily At Night      famotidine (PEPCID) 40 MG tablet Take 1 tablet (40 mg total) by mouth in the morning 180 tablet 1    FLUoxetine (PROzac) 40 MG capsule Take 1 capsule (40 mg total) by mouth 2 (two) times a day 60 capsule 0    hydroCHLOROthiazide 25 mg tablet Take 1 tablet (25 mg total) by mouth in the morning 90 tablet 1    levothyroxine 50 mcg tablet Take 50 mcg by mouth daily      montelukast (SINGULAIR) 10 mg tablet Take 1 tablet (10 mg total) by mouth daily at bedtime 90 tablet 1    simvastatin (ZOCOR) 10 mg tablet Take 1 tablet (10 mg total) by mouth daily at bedtime 90 tablet 1    Symbicort 160-4.5 MCG/ACT  "inhaler USE 2 INHALATIONS BY MOUTH TWICE DAILY RINSE MOUTH AFTER USE 30.6 g 3    methylPREDNISolone 4 MG tablet therapy pack Use as directed on package (Patient not taking: Reported on 8/12/2024) 21 tablet 0     No current facility-administered medications for this visit.     Allergies: Ascorbate - food allergy    Objective:  Vitals:    08/12/24 0940   BP: 120/84   Pulse: 65   Temp: 97.8 °F (36.6 °C)   SpO2: 96%   Weight: 75.3 kg (166 lb)   Height: 5' 2\" (1.575 m)   Oxygen Therapy  SpO2: 96 %  .  Wt Readings from Last 3 Encounters:   08/12/24 75.3 kg (166 lb)   07/23/24 73 kg (161 lb)   07/10/24 74.4 kg (164 lb)     Body mass index is 30.36 kg/m².    Physical Exam  Vitals and nursing note reviewed.   Constitutional:       Appearance: She is well-developed.   HENT:      Head: Normocephalic and atraumatic.   Eyes:      Conjunctiva/sclera: Conjunctivae normal.      Pupils: Pupils are equal, round, and reactive to light.   Neck:      Thyroid: No thyromegaly.      Vascular: No JVD.   Cardiovascular:      Rate and Rhythm: Normal rate and regular rhythm.      Heart sounds: Normal heart sounds. No murmur heard.     No friction rub. No gallop.   Pulmonary:      Effort: Pulmonary effort is normal. No respiratory distress.      Breath sounds: Normal breath sounds. No wheezing or rales.   Chest:      Chest wall: No tenderness.   Musculoskeletal:         General: No tenderness or deformity. Normal range of motion.      Cervical back: Normal range of motion and neck supple.   Lymphadenopathy:      Cervical: No cervical adenopathy.   Skin:     General: Skin is warm and dry.   Neurological:      Mental Status: She is alert and oriented to person, place, and time.         Lab Review:   No visits with results within 6 Month(s) from this visit.   Latest known visit with results is:   Appointment on 12/14/2023   Component Date Value    WBC 12/14/2023 5.54     RBC 12/14/2023 5.03     Hemoglobin 12/14/2023 15.1     Hematocrit 12/14/2023 " 45.7     MCV 12/14/2023 91     MCH 12/14/2023 30.0     MCHC 12/14/2023 33.0     RDW 12/14/2023 13.7     MPV 12/14/2023 9.9     Platelets 12/14/2023 238     nRBC 12/14/2023 0     Segmented % 12/14/2023 72     Immature Grans % 12/14/2023 0     Lymphocytes % 12/14/2023 17     Monocytes % 12/14/2023 8     Eosinophils Relative 12/14/2023 2     Basophils Relative 12/14/2023 1     Absolute Neutrophils 12/14/2023 4.00     Absolute Immature Grans 12/14/2023 0.01     Absolute Lymphocytes 12/14/2023 0.93     Absolute Monocytes 12/14/2023 0.43     Eosinophils Absolute 12/14/2023 0.11     Basophils Absolute 12/14/2023 0.06     Sodium 12/14/2023 139     Potassium 12/14/2023 3.6     Chloride 12/14/2023 98     CO2 12/14/2023 30     ANION GAP 12/14/2023 11     BUN 12/14/2023 20     Creatinine 12/14/2023 0.85     Glucose, Fasting 12/14/2023 97     Calcium 12/14/2023 9.4     AST 12/14/2023 26     ALT 12/14/2023 25     Alkaline Phosphatase 12/14/2023 89     Total Protein 12/14/2023 6.9     Albumin 12/14/2023 4.5     Total Bilirubin 12/14/2023 0.66     eGFR 12/14/2023 64     Cholesterol 12/14/2023 228 (H)     Triglycerides 12/14/2023 94     HDL, Direct 12/14/2023 66     LDL Calculated 12/14/2023 143 (H)     Non-HDL-Chol (CHOL-HDL) 12/14/2023 162     TSH 3RD GENERATON 12/14/2023 2.679        Diagnostics:  I have personally reviewed pertinent films in PACS  ESS: Total score: 7

## 2024-08-19 ENCOUNTER — TELEPHONE (OUTPATIENT)
Age: 84
End: 2024-08-19

## 2024-08-19 NOTE — TELEPHONE ENCOUNTER
Patient called in requesting [employer] number I provide the patient with their telephone number and transfer her over .

## 2024-08-22 ENCOUNTER — ANESTHESIA (OUTPATIENT)
Dept: GASTROENTEROLOGY | Facility: HOSPITAL | Age: 84
End: 2024-08-22

## 2024-08-22 ENCOUNTER — HOSPITAL ENCOUNTER (OUTPATIENT)
Dept: GASTROENTEROLOGY | Facility: HOSPITAL | Age: 84
Setting detail: OUTPATIENT SURGERY
End: 2024-08-22
Attending: INTERNAL MEDICINE
Payer: MEDICARE

## 2024-08-22 ENCOUNTER — ANESTHESIA EVENT (OUTPATIENT)
Dept: GASTROENTEROLOGY | Facility: HOSPITAL | Age: 84
End: 2024-08-22

## 2024-08-22 VITALS
HEART RATE: 78 BPM | BODY MASS INDEX: 30.35 KG/M2 | SYSTOLIC BLOOD PRESSURE: 151 MMHG | HEIGHT: 62 IN | RESPIRATION RATE: 18 BRPM | DIASTOLIC BLOOD PRESSURE: 71 MMHG | TEMPERATURE: 98 F | WEIGHT: 164.9 LBS | OXYGEN SATURATION: 97 %

## 2024-08-22 DIAGNOSIS — K21.9 GASTROESOPHAGEAL REFLUX DISEASE WITHOUT ESOPHAGITIS: ICD-10-CM

## 2024-08-22 DIAGNOSIS — R13.10 DYSPHAGIA, UNSPECIFIED TYPE: ICD-10-CM

## 2024-08-22 DIAGNOSIS — I10 ESSENTIAL HYPERTENSION: ICD-10-CM

## 2024-08-22 PROCEDURE — 43239 EGD BIOPSY SINGLE/MULTIPLE: CPT | Performed by: INTERNAL MEDICINE

## 2024-08-22 PROCEDURE — 43248 EGD GUIDE WIRE INSERTION: CPT | Performed by: INTERNAL MEDICINE

## 2024-08-22 PROCEDURE — 88305 TISSUE EXAM BY PATHOLOGIST: CPT | Performed by: PATHOLOGY

## 2024-08-22 RX ORDER — HYDROCHLOROTHIAZIDE 25 MG/1
25 TABLET ORAL EVERY MORNING
Qty: 90 TABLET | Refills: 3 | Status: SHIPPED | OUTPATIENT
Start: 2024-08-22

## 2024-08-22 RX ORDER — PANTOPRAZOLE SODIUM 40 MG/1
40 TABLET, DELAYED RELEASE ORAL DAILY
Qty: 30 TABLET | Refills: 2 | Status: SHIPPED | OUTPATIENT
Start: 2024-08-22

## 2024-08-22 RX ORDER — LIDOCAINE HYDROCHLORIDE 20 MG/ML
INJECTION, SOLUTION EPIDURAL; INFILTRATION; INTRACAUDAL; PERINEURAL AS NEEDED
Status: DISCONTINUED | OUTPATIENT
Start: 2024-08-22 | End: 2024-08-22

## 2024-08-22 RX ORDER — PROPOFOL 10 MG/ML
INJECTION, EMULSION INTRAVENOUS AS NEEDED
Status: DISCONTINUED | OUTPATIENT
Start: 2024-08-22 | End: 2024-08-22

## 2024-08-22 RX ORDER — SODIUM CHLORIDE, SODIUM LACTATE, POTASSIUM CHLORIDE, CALCIUM CHLORIDE 600; 310; 30; 20 MG/100ML; MG/100ML; MG/100ML; MG/100ML
INJECTION, SOLUTION INTRAVENOUS CONTINUOUS PRN
Status: DISCONTINUED | OUTPATIENT
Start: 2024-08-22 | End: 2024-08-22

## 2024-08-22 RX ADMIN — SODIUM CHLORIDE, SODIUM LACTATE, POTASSIUM CHLORIDE, AND CALCIUM CHLORIDE: .6; .31; .03; .02 INJECTION, SOLUTION INTRAVENOUS at 08:15

## 2024-08-22 RX ADMIN — PROPOFOL 20 MG: 10 INJECTION, EMULSION INTRAVENOUS at 08:33

## 2024-08-22 RX ADMIN — PROPOFOL 100 MG: 10 INJECTION, EMULSION INTRAVENOUS at 08:30

## 2024-08-22 RX ADMIN — LIDOCAINE HYDROCHLORIDE 80 MG: 20 INJECTION, SOLUTION EPIDURAL; INFILTRATION; INTRACAUDAL; PERINEURAL at 08:29

## 2024-08-22 NOTE — ANESTHESIA PREPROCEDURE EVALUATION
Procedure:  EGD    Relevant Problems   CARDIO   (+) Essential hypertension   (+) Other hyperlipidemia      ENDO   (+) Acquired hypothyroidism      GI/HEPATIC   (+) Dysphagia   (+) Gastroesophageal reflux disease without esophagitis      NEURO/PSYCH   (+) Depression with anxiety      PULMONARY   (+) Asthma   (+) CHUYITA (obstructive sleep apnea)   (+) CHUYITA on CPAP   (+) Strep throat      PFTs in 2019 with no evidence of any large airway obstruction but she has been on bronchodilators for a long time and she states it has been helping her  Continue with Symbicort 160/4.52 puffs twice daily      Left Ventricle: Left ventricular cavity size is normal. Wall thickness is normal. The left ventricular ejection fraction is 60%. Systolic function is normal. Wall motion is normal. Diastolic function is mildly abnormal, consistent with grade I (abnormal) relaxation.    Mitral Valve: There is annular calcification.    Tricuspid Valve: There is mild regurgitation.       Physical Exam    Airway    Mallampati score: II  TM Distance: >3 FB  Neck ROM: full     Dental       Cardiovascular      Pulmonary      Other Findings  post-pubertal.      Anesthesia Plan  ASA Score- 3     Anesthesia Type- IV sedation with anesthesia with ASA Monitors.         Additional Monitors:     Airway Plan:     Comment: Recent labs personally reviewed:  Lab Results       Component                Value               Date                       WBC                      5.54                12/14/2023                 HGB                      15.1                12/14/2023                 PLT                      238                 12/14/2023            Lab Results       Component                Value               Date                       NA                       141                 05/19/2015                 K                        3.6                 12/14/2023                 BUN                      20                  12/14/2023                 CREATININE     "           0.85                12/14/2023                 GLUCOSE                  107 (H)             05/19/2015            No results found for: \"PTT\"   Lab Results       Component                Value               Date                       INR                      1.14                03/18/2019              Blood type     I, Graciela Vaca MD, have personally seen and evaluated the patient prior to anesthetic care.  I have reviewed the pre-anesthetic record, medical history, allergies, medications and any other medical records if appropriate to the anesthetic care.  If a CRNA is involved in the case, I have reviewed the CRNA assessment, if present, and agree. Patient consented for IV Sedation, general anesthesia as back up. Discussed risks of aspiration, IV infiltration, indications for conversion to general anesthesia. All questions and concerns addressed.     .       Plan Factors-Exercise tolerance (METS): >4 METS.    Chart reviewed.   Existing labs reviewed. Patient summary reviewed.    Patient is not a current smoker.  Patient did not smoke on day of surgery.    Obstructive sleep apnea risk education given perioperatively.        Induction- intravenous.    Postoperative Plan-         Informed Consent- Anesthetic plan and risks discussed with patient.  I personally reviewed this patient with the CRNA. Discussed and agreed on the Anesthesia Plan with the CRNA..        "

## 2024-08-22 NOTE — H&P
History and Physical - [facility]  Roslyn Manzo 83 y.o. female MRN: [id number]                  HPI: Roslyn Manzo is a 83 y.o. year old female who presents for endoscopy for dysphagia      REVIEW OF SYSTEMS: Per the HPI, and otherwise unremarkable.    Historical Information   Past Medical History:   Diagnosis Date    Anxiety     Arthritis     Asthma     COPD (chronic obstructive pulmonary disease) ([facility])     COVID 2022    CPAP (continuous positive airway pressure) dependence     Depression     Disease of thyroid gland     GERD (gastroesophageal reflux disease)     History of transfusion     Hyperlipidemia     Hypotension     MRSA (methicillin resistant Staphylococcus aureus) infection     about 15yrs ago    Shortness of breath     HUNTER    Sleep apnea     Sleep apnea, obstructive     Use of cane as ambulatory aid     sometimes    Wears glasses     Wrist fracture      Past Surgical History:   Procedure Laterality Date    BACK SURGERY  2017     SECTION      x3    COLONOSCOPY      DILATION AND EVACUATION      miscarriage    EGD      INCONTINENCE SURGERY      JOINT REPLACEMENT Left     left knee sugery x2    [place] NSL/SINUS NDSC MAX ANTROST W/RMVL TISS MAX SINUS N/A 2022    Procedure: FUNCTIONAL ENDOSCOPIC SINUS SURGERY (FESS) IMAGED GUIDED LEFT MAXILLARY ANTROSTOMY ETHMOIDECTOMY FRONTAL SINUSOTOMY WITH DRAINAGE OF FRONTAL SINUS MUCOCELE;  Surgeon: Darryl Plunkett MD;  Location: [facility];  Service: ENT    REPLACEMENT TOTAL KNEE BILATERAL      SINUS SURGERY      TUBAL LIGATION       Social History   Social History     Substance and Sexual Activity   Alcohol Use Yes    Comment: occasionally cinnamon whiskey     Social History     Substance and Sexual Activity   Drug Use No     Social History     Tobacco Use   Smoking Status Former    Current packs/day: 0.00    Average packs/day: 0.3 packs/day for 30.0 years (7.5 ttl pk-yrs)    Types: Cigarettes    Start date: 1970    Quit  "date: 2000    Years since quittin.6    Passive exposure: Never   Smokeless Tobacco Never     Family History   Problem Relation Age of Onset    No Known Problems Mother     No Known Problems Father     No Known Problems Sister     No Known Problems Brother     No Known Problems Maternal Grandmother     No Known Problems Maternal Grandfather     No Known Problems Paternal Grandmother     No Known Problems Paternal Grandfather     No Known Problems Maternal Aunt     No Known Problems Maternal Uncle     No Known Problems Paternal Aunt     No Known Problems Paternal Uncle     No Known Problems Cousin        Meds/Allergies     Not in a hospital admission.    Allergies   Allergen Reactions    Ascorbate - Food Allergy GI Intolerance     Patient reported acid reflux with eating oranges.        Objective     Blood pressure 134/66, pulse 67, temperature 97.8 °F (36.6 °C), temperature source Temporal, resp. rate 20, height 5' 2\" (1.575 m), weight 74.8 kg (164 lb 14.5 oz), SpO2 98%.      PHYSICAL EXAM    /66   Pulse 67   Temp 97.8 °F (36.6 °C) (Temporal)   Resp 20   Ht 5' 2\" (1.575 m)   Wt 74.8 kg (164 lb 14.5 oz)   SpO2 98%   BMI 30.16 kg/m²       Gen: NAD  CV: RRR  CHEST: Clear  ABD: soft, NT/ND  EXT: no edema      ASSESSMENT/PLAN:  This is a 83 y.o. year old female here for endoscopy, and she is stable and optimized for her procedure.        "

## 2024-08-26 NOTE — RESULT ENCOUNTER NOTE
IMPRESSION:  Schatzki's ring status post 54 [place] Savary dilation  1 cm hiatal hernia      RECOMMENDATION:  Await pathology results    PPI course for 8 to 12 weeks  Reflux precautions

## 2024-08-27 PROCEDURE — 88305 TISSUE EXAM BY PATHOLOGIST: CPT | Performed by: PATHOLOGY

## 2024-08-29 ENCOUNTER — NURSE TRIAGE (OUTPATIENT)
Age: 84
End: 2024-08-29

## 2024-08-29 NOTE — TELEPHONE ENCOUNTER
"Pt calling in, reports EGD on 8/22/24 and as of two days ago having itching around navel with redness from itching but no rash seen and feels her abdomen looks a little more bloated. She did switch laundry detergent recently which I advised she may have a sensitivity too causing the skin irritation. She will monitor this.   Pt reports normal bowel habits, no rectal bleeding, no n/v or pain.   Pt was using a cpap machine but it is not working properly so she was advised by her doctor to stop using it for now. I explained see how her bloating does while she is off this for now, avoid gas producing foods and carbonated beverages. Pt understood. She will call back if symptoms worsen.   Reason for Disposition   Mild localized itching    Answer Assessment - Initial Assessment Questions  1. DESCRIPTION: \"Describe the itching you are having.\" \"Where is it located?\"      Around navel   2. SEVERITY: \"How bad is it?\"     - MILD - doesn't interfere with normal activities    - MODERATE-SEVERE: interferes with work, school, sleep, or other activities       Mild   3. SCRATCHING: \"Are there any scratch marks? Bleeding?\"      Redness   4. ONSET: \"When did the itching begin?\"       2 days ago   5. CAUSE: \"What do you think is causing the itching?\"       Not sure   6. OTHER SYMPTOMS: \"Do you have any other symptoms?\"       No    Protocols used: Itching - Localized-ADULT-[place]    "

## 2024-09-03 ENCOUNTER — TELEPHONE (OUTPATIENT)
Dept: GASTROENTEROLOGY | Facility: CLINIC | Age: 84
End: 2024-09-03

## 2024-09-03 ENCOUNTER — TELEPHONE (OUTPATIENT)
Age: 84
End: 2024-09-03

## 2024-09-03 DIAGNOSIS — E78.49 OTHER HYPERLIPIDEMIA: ICD-10-CM

## 2024-09-03 DIAGNOSIS — J44.89 ASTHMA WITH COPD (CHRONIC OBSTRUCTIVE PULMONARY DISEASE): ICD-10-CM

## 2024-09-03 DIAGNOSIS — J30.2 SEASONAL ALLERGIES: ICD-10-CM

## 2024-09-03 NOTE — TELEPHONE ENCOUNTER
Pt has used her cpap 2x and the 1st time the patient sleep all through the night, the 2nd time the water was completely out and she only slept about an hour and her face was all dry. She did take it to [facility] and the attendant never looked at it, but she took it again and there was nothing wrong with the machine. She called [facility] and found out the humidifier on the machine is broken. They ordered the humidifier.         If she can be without air for 3 weeks then why does she need to be on a CPAP? She would like to know if there is a way for our office to reach out to [facility] to expedite the process so she can get the machine quicker.       She would like a call back with an update.

## 2024-09-03 NOTE — TELEPHONE ENCOUNTER
----- Message from Yves Yen MD sent at 9/3/2024  7:33 AM EDT -----  Please review with the patient    Real Mclaughlin-     The biopsies of the stomach and the small intestine were negative.  Have a great day.

## 2024-09-04 RX ORDER — SIMVASTATIN 10 MG
10 TABLET ORAL
Qty: 90 TABLET | Refills: 1 | Status: SHIPPED | OUTPATIENT
Start: 2024-09-04

## 2024-09-04 RX ORDER — BUDESONIDE AND FORMOTEROL FUMARATE DIHYDRATE 160; 4.5 UG/1; UG/1
AEROSOL RESPIRATORY (INHALATION)
Qty: 30.6 G | Refills: 1 | Status: SHIPPED | OUTPATIENT
Start: 2024-09-04

## 2024-09-04 RX ORDER — MONTELUKAST SODIUM 10 MG/1
10 TABLET ORAL
Qty: 90 TABLET | Refills: 1 | Status: SHIPPED | OUTPATIENT
Start: 2024-09-04

## 2024-09-24 ENCOUNTER — OFFICE VISIT (OUTPATIENT)
Dept: FAMILY MEDICINE CLINIC | Facility: CLINIC | Age: 84
End: 2024-09-24
Payer: MEDICARE

## 2024-09-24 VITALS
HEIGHT: 62 IN | SYSTOLIC BLOOD PRESSURE: 100 MMHG | DIASTOLIC BLOOD PRESSURE: 60 MMHG | BODY MASS INDEX: 30.41 KG/M2 | OXYGEN SATURATION: 95 % | TEMPERATURE: 97.6 F | WEIGHT: 165.25 LBS | HEART RATE: 86 BPM

## 2024-09-24 DIAGNOSIS — R05.1 ACUTE COUGH: ICD-10-CM

## 2024-09-24 DIAGNOSIS — J40 BRONCHITIS: Primary | ICD-10-CM

## 2024-09-24 LAB
SARS-COV-2 AG UPPER RESP QL IA: NEGATIVE
VALID CONTROL: NORMAL

## 2024-09-24 PROCEDURE — 99213 OFFICE O/P EST LOW 20 MIN: CPT | Performed by: STUDENT IN AN ORGANIZED HEALTH CARE EDUCATION/TRAINING PROGRAM

## 2024-09-24 PROCEDURE — G2211 COMPLEX E/M VISIT ADD ON: HCPCS | Performed by: STUDENT IN AN ORGANIZED HEALTH CARE EDUCATION/TRAINING PROGRAM

## 2024-09-24 PROCEDURE — 87811 SARS-COV-2 COVID19 W/OPTIC: CPT | Performed by: STUDENT IN AN ORGANIZED HEALTH CARE EDUCATION/TRAINING PROGRAM

## 2024-09-24 PROCEDURE — 87636 SARSCOV2 & INF A&B AMP PRB: CPT | Performed by: STUDENT IN AN ORGANIZED HEALTH CARE EDUCATION/TRAINING PROGRAM

## 2024-09-24 RX ORDER — METHYLPREDNISOLONE 4 MG
TABLET, DOSE PACK ORAL
Qty: 21 EACH | Refills: 0 | Status: SHIPPED | OUTPATIENT
Start: 2024-09-24

## 2024-09-24 NOTE — ASSESSMENT & PLAN NOTE
COVID-negative, will send off for COVID and flu PCR.  At this time we will treat with Augmentin twice daily x 7 days and Medrol Dosepak.  Return precautions given to patient and I will follow-up once PCR results.    Orders:    amoxicillin-clavulanate (AUGMENTIN) 875-125 mg per tablet; Take 1 tablet by mouth every 12 (twelve) hours for 7 days    methylPREDNISolone 4 MG tablet therapy pack; Use as directed on package

## 2024-09-24 NOTE — PROGRESS NOTES
"Ambulatory Visit  Name: Roslyn Manzo      : 1940      MRN: [id number]  Encounter Provider: Lelia Mccord DO  Encounter Date: 2024   Encounter department: [facility]    Assessment & Plan  Bronchitis  COVID-negative, will send off for COVID and flu PCR.  At this time we will treat with Augmentin twice daily x 7 days and Medrol Dosepak.  Return precautions given to patient and I will follow-up once PCR results.    Orders:    amoxicillin-clavulanate (AUGMENTIN) 875-125 mg per tablet; Take 1 tablet by mouth every 12 (twelve) hours for 7 days    methylPREDNISolone 4 MG tablet therapy pack; Use as directed on package    Acute cough            History of Present Illness     Patient reports that she has been having symptoms for past two days. States that she received the covid and flu vaccine yesterday.    Cough  This is a new problem. The current episode started in the past 7 days. The problem has been gradually worsening. The problem occurs constantly. The cough is Non-productive. Associated symptoms include rhinorrhea. Pertinent negatives include no chest pain, chills, fever, headaches or sore throat. Nothing aggravates the symptoms. She has tried nothing for the symptoms.         Review of Systems   Constitutional:  Negative for chills and fever.   HENT:  Positive for congestion and rhinorrhea. Negative for sore throat.    Respiratory:  Positive for cough.    Cardiovascular:  Negative for chest pain and palpitations.   Gastrointestinal:  Negative for abdominal pain, constipation, diarrhea, nausea and vomiting.   Neurological:  Negative for dizziness and headaches.           Objective     /60 (BP Location: Left arm, Patient Position: Sitting, Cuff Size: Large)   Pulse 86   Temp 97.6 °F (36.4 °C) (Temporal)   Ht 5' 2\" (1.575 m)   Wt 75 kg (165 lb 4 oz)   SpO2 95%   BMI 30.22 kg/m²     Physical Exam  Vitals reviewed.   Constitutional:       Appearance: Normal " appearance.   HENT:      Head: Normocephalic and atraumatic.      Nose: No congestion or rhinorrhea.      Mouth/Throat:      Mouth: Mucous membranes are moist.      Pharynx: No oropharyngeal exudate or posterior oropharyngeal erythema.   Eyes:      Extraocular Movements: Extraocular movements intact.   Cardiovascular:      Rate and Rhythm: Normal rate and regular rhythm.      Heart sounds: Normal heart sounds.   Pulmonary:      Effort: Pulmonary effort is normal.      Breath sounds: Wheezing and rhonchi present.   Musculoskeletal:      Cervical back: Neck supple. No tenderness.   Lymphadenopathy:      Cervical: No cervical adenopathy.   Neurological:      General: No focal deficit present.      Mental Status: She is alert and oriented to person, place, and time.   Psychiatric:         Mood and Affect: Mood normal.         Behavior: Behavior normal.

## 2024-09-25 LAB
FLUAV RNA RESP QL NAA+PROBE: NEGATIVE
FLUBV RNA RESP QL NAA+PROBE: NEGATIVE
SARS-COV-2 RNA RESP QL NAA+PROBE: NEGATIVE

## 2024-09-27 ENCOUNTER — NURSE TRIAGE (OUTPATIENT)
Age: 84
End: 2024-09-27

## 2024-09-27 ENCOUNTER — APPOINTMENT (OUTPATIENT)
Dept: RADIOLOGY | Facility: CLINIC | Age: 84
End: 2024-09-27
Payer: MEDICARE

## 2024-09-27 ENCOUNTER — OFFICE VISIT (OUTPATIENT)
Dept: URGENT CARE | Facility: CLINIC | Age: 84
End: 2024-09-27
Payer: MEDICARE

## 2024-09-27 ENCOUNTER — TELEPHONE (OUTPATIENT)
Age: 84
End: 2024-09-27

## 2024-09-27 VITALS
RESPIRATION RATE: 20 BRPM | HEART RATE: 95 BPM | TEMPERATURE: 97 F | SYSTOLIC BLOOD PRESSURE: 132 MMHG | OXYGEN SATURATION: 97 % | DIASTOLIC BLOOD PRESSURE: 79 MMHG

## 2024-09-27 DIAGNOSIS — R05.1 ACUTE COUGH: ICD-10-CM

## 2024-09-27 DIAGNOSIS — J40 BRONCHITIS: Primary | ICD-10-CM

## 2024-09-27 PROCEDURE — G0463 HOSPITAL OUTPT CLINIC VISIT: HCPCS | Performed by: PHYSICIAN ASSISTANT

## 2024-09-27 PROCEDURE — 71046 X-RAY EXAM CHEST 2 VIEWS: CPT

## 2024-09-27 PROCEDURE — 99214 OFFICE O/P EST MOD 30 MIN: CPT | Performed by: PHYSICIAN ASSISTANT

## 2024-09-27 RX ORDER — IPRATROPIUM BROMIDE AND ALBUTEROL SULFATE 2.5; .5 MG/3ML; MG/3ML
3 SOLUTION RESPIRATORY (INHALATION)
Status: DISCONTINUED | OUTPATIENT
Start: 2024-09-27 | End: 2024-09-27

## 2024-09-27 RX ORDER — IPRATROPIUM BROMIDE AND ALBUTEROL SULFATE 2.5; .5 MG/3ML; MG/3ML
3 SOLUTION RESPIRATORY (INHALATION) ONCE
Status: COMPLETED | OUTPATIENT
Start: 2024-09-27 | End: 2024-09-27

## 2024-09-27 RX ADMIN — IPRATROPIUM BROMIDE AND ALBUTEROL SULFATE 3 ML: 2.5; .5 SOLUTION RESPIRATORY (INHALATION) at 14:41

## 2024-09-27 NOTE — TELEPHONE ENCOUNTER
----- Message from Mary DAN sent at 9/27/2024 12:27 PM EDT -----  Patient is experiencing SOB she is on antibiotics and steroids, but thinks symptoms is getting worst.  Message was sent to office earlier but not reply to patient.

## 2024-09-27 NOTE — TELEPHONE ENCOUNTER
Pt seen on 9/24/24 for bronchitis and has been taking antibiotic and steroids x 5 days. Pt states she has bronchial asthma and does not feel like she is getting any better, actually feels worse.  States she is using her inhalers and doing everything she is supposed to do. States she also has a lot of phlegm. Asking for further recommendations.    Please advise     Covid and Flu negative.

## 2024-09-27 NOTE — TELEPHONE ENCOUNTER
"Called pt back to gather further information on her s/s.  Pt states that her breathing and cough have gotten worse.  Pt just completed breathing treatment prior to this RN calling.  Pt states her O2 sats prior to call were 91%.  Pt checked her O2 sat post treatment and it was 98-99.      Pt will have a coughing fit if she tries to take a deep breath in.  States it feels like phlegm is stuck \"in the wrong place.\"  Pt denies lightheadedness or dizziness.  Advised pt go to  for further evaluation as she does get SOB after a coughing fit.  Pt agreeable with plan.    Reason for Disposition   Patient sounds very sick or weak to the triager    Answer Assessment - Initial Assessment Questions  1. RESPIRATORY STATUS: \"Describe your breathing?\" (e.g., wheezing, shortness of breath, unable to speak, severe coughing)       Short of breath.  Severe coughing when trying to take a deep breath    2. ONSET: \"When did this breathing problem begin?\"       In the past week    3. PATTERN \"Does the difficult breathing come and go, or has it been constant since it started?\"       Constant    4. SEVERITY: \"How bad is your breathing?\" (e.g., mild, moderate, severe)     - MILD: No SOB at rest, mild SOB with walking, speaks normally in sentences, can lay down, no retractions, pulse < 100.     - MODERATE: SOB at rest, SOB with minimal exertion and prefers to sit, cannot lie down flat, speaks in phrases, mild retractions, audible wheezing, pulse 100-120.     - SEVERE: Very SOB at rest, speaks in single words, struggling to breathe, sitting hunched forward, retractions, pulse > 120       Moderate - Severe    5. RECURRENT SYMPTOM: \"Have you had difficulty breathing before?\" If Yes, ask: \"When was the last time?\" and \"What happened that time?\"       Yes was prescribed Augmentin recently.      6. CARDIAC HISTORY: \"Do you have any history of heart disease?\" (e.g., heart attack, angina, bypass surgery, angioplasty)       Htn    7. LUNG HISTORY: \"Do " "you have any history of lung disease?\"  (e.g., pulmonary embolus, asthma, emphysema)      [place], Asthma, Bronchitis    8. CAUSE: \"What do you think is causing the breathing problem?\"       That mucus is getting stuck in the \"wrong spot.\"    9. OTHER SYMPTOMS: \"Do you have any other symptoms? (e.g., dizziness, runny nose, cough, chest pain, fever)      URI S/S    Protocols used: Breathing Difficulty-ADULT-[place]    "

## 2024-09-27 NOTE — PROGRESS NOTES
[facility]        NAME: Roslyn Manzo is a 83 y.o. female  : 1940    MRN: [id number]  DATE: 2024  TIME: 3:09 PM    Assessment and Plan   Bronchitis [J40]  1. Bronchitis        2. Acute cough  XR chest pa and lateral    ipratropium-albuterol (DUO-NEB) 0.5-2.5 mg/3 mL inhalation solution 3 mL    DISCONTINUED: ipratropium-albuterol (DUO-NEB) 0.5-2.5 mg/3 mL inhalation solution 3 mL            Patient Instructions   Patient is significantly improved after DuoNeb.  Patient will go home and continue medication.  We discussed any worsening of symptoms she should report to the ER immediately.  Chest x-ray unremarkable. Finish medrol dose brenden and Augmentin. Patient has enough albuterol at home. Continue Symbicort.   If tests have been performed at [facility], our office will contact you with results if changes need to be made to the care plan discussed with you at the visit.  You can review your full results on [facility]  Follow up with PCP in 3-5 days.  Proceed to  ER if symptoms worsen.    Chief Complaint     Chief Complaint   Patient presents with    Cough     Patient here with cough for 5 days now. She went to her PCP for this issue and they gave her antibiotics and a steroid which she has been taking, but she states she still cannot take a deep breath without coughing. She was negative for covid. She states she also has asthma and she feels like mucus is in her chest. She used her nebulizer before she came here .         History of Present Illness       HPI  This is an 83-year-old female here complaining of worsening cough.  Patient has had cough for about 5 days but notes today the cough is worse.  She did see the family doctor on the  and was given Augmentin and a Medrol Dosepak which she has been taking since then.  She had negative flu and COVID testing.  She has been using her Symbicort faithfully twice a day and her albuterol inhaler which she last used approximately 1  hour ago.  She is also using up humidifier.  She does note she is having postnasal drip and nasal congestion.  She does have a history of asthma.  She denies shortness of breath aside from with coughing fits and exertion.  She denies chest pain, vomiting or diarrhea, fever, sore throat, ear pain.  Review of Systems   Review of Systems   Constitutional:  Negative for fever.   HENT:  Positive for congestion and postnasal drip. Negative for ear pain and sore throat.    Respiratory:  Positive for cough and shortness of breath.    Cardiovascular:  Negative for chest pain.   Gastrointestinal:  Negative for diarrhea and vomiting.         Current Medications       Current Outpatient Medications:     albuterol (ACCUNEB) 0.63 MG/3ML nebulizer solution, Take 3 mL (0.63 mg total) by nebulization every 6 (six) hours as needed for wheezing or shortness of breath, Disp: 1080 mL, Rfl: 3    albuterol (PROVENTIL HFA,VENTOLIN HFA) 90 mcg/act inhaler, Inhale 1 puff every 6 (six) hours as needed for wheezing, Disp: 18 g, Rfl: 3    amoxicillin-clavulanate (AUGMENTIN) 875-125 mg per tablet, Take 1 tablet by mouth every 12 (twelve) hours for 7 days, Disp: 14 tablet, Rfl: 0    famotidine (PEPCID) 40 MG tablet, Take 1 tablet (40 mg total) by mouth in the morning, Disp: 180 tablet, Rfl: 1    FLUoxetine (PROzac) 40 MG capsule, Take 1 capsule (40 mg total) by mouth 2 (two) times a day, Disp: 60 capsule, Rfl: 0    hydroCHLOROthiazide 25 mg tablet, TAKE 1 TABLET BY MOUTH IN THE  MORNING, Disp: 90 tablet, Rfl: 3    levothyroxine 50 mcg tablet, Take 50 mcg by mouth daily, Disp: , Rfl:     methylPREDNISolone 4 MG tablet therapy pack, Use as directed on package, Disp: 21 each, Rfl: 0    montelukast (SINGULAIR) 10 mg tablet, TAKE 1 TABLET BY MOUTH DAILY AT  BEDTIME, Disp: 90 tablet, Rfl: 1    pantoprazole (PROTONIX) 40 mg tablet, Take 1 tablet (40 mg total) by mouth daily, Disp: 30 tablet, Rfl: 2    simvastatin (ZOCOR) 10 mg tablet, TAKE 1 TABLET BY  MOUTH DAILY AT  BEDTIME, Disp: 90 tablet, Rfl: 1    Symbicort 160-4.5 MCG/ACT inhaler, USE 2 INHALATIONS BY MOUTH TWICE DAILY . RINSE MOUTH AFTER USE ., Disp: 30.6 g, Rfl: 1  No current facility-administered medications for this visit.    Current Allergies     Allergies as of 2024 - Reviewed 2024   Allergen Reaction Noted    Ascorbate - food allergy GI Intolerance 2009            The following portions of the patient's history were reviewed and updated as appropriate: allergies, current medications, past family history, past medical history, past social history, past surgical history and problem list.     Past Medical History:   Diagnosis Date    Anxiety     Arthritis     Asthma     COPD (chronic obstructive pulmonary disease) ([facility])     COVID 2022    CPAP (continuous positive airway pressure) dependence     Depression     Disease of thyroid gland     GERD (gastroesophageal reflux disease)     History of transfusion     Hyperlipidemia     Hypotension     MRSA (methicillin resistant Staphylococcus aureus) infection     about 15yrs ago    Shortness of breath     HUNTER    Sleep apnea     Sleep apnea, obstructive     Use of cane as ambulatory aid     sometimes    Wears glasses     Wrist fracture        Past Surgical History:   Procedure Laterality Date    BACK SURGERY  2017     SECTION      x3    COLONOSCOPY      DILATION AND EVACUATION      miscarriage    EGD      INCONTINENCE SURGERY      JOINT REPLACEMENT Left     left knee sugery x2    [place] NSL/SINUS NDSC MAX ANTROST W/RMVL TISS MAX SINUS N/A 2022    Procedure: FUNCTIONAL ENDOSCOPIC SINUS SURGERY (FESS) IMAGED GUIDED LEFT MAXILLARY ANTROSTOMY ETHMOIDECTOMY FRONTAL SINUSOTOMY WITH DRAINAGE OF FRONTAL SINUS MUCOCELE;  Surgeon: Darryl Plunkett MD;  Location: [facility];  Service: ENT    REPLACEMENT TOTAL KNEE BILATERAL      SINUS SURGERY      TUBAL LIGATION         Family History   Problem Relation Age of Onset    No Known Problems  Mother     No Known Problems Father     No Known Problems Sister     No Known Problems Brother     No Known Problems Maternal Grandmother     No Known Problems Maternal Grandfather     No Known Problems Paternal Grandmother     No Known Problems Paternal Grandfather     No Known Problems Maternal Aunt     No Known Problems Maternal Uncle     No Known Problems Paternal Aunt     No Known Problems Paternal Uncle     No Known Problems Cousin          Medications have been verified.        Objective   /79   Pulse 95   Temp (!) 97 °F (36.1 °C)   Resp 20   SpO2 97%        Physical Exam     Physical Exam  Vitals and nursing note reviewed.   Constitutional:       General: She is not in acute distress.     Appearance: Normal appearance. She is not ill-appearing, toxic-appearing or diaphoretic.   HENT:      Right Ear: Tympanic membrane, ear canal and external ear normal.      Left Ear: Tympanic membrane, ear canal and external ear normal.      Nose: Nose normal.      Mouth/Throat:      Mouth: Mucous membranes are moist.      Pharynx: No posterior oropharyngeal erythema.   Cardiovascular:      Rate and Rhythm: Normal rate and regular rhythm.   Pulmonary:      Effort: Pulmonary effort is normal. No respiratory distress.      Breath sounds: No stridor. Examination of the left-upper field reveals rhonchi and rales. Examination of the left-middle field reveals rhonchi and rales. Examination of the left-lower field reveals rhonchi and rales. Rhonchi and rales present. No wheezing.      Comments: Patient is able to speak in full sentences and is telling jokes during the exam   Chest:      Chest wall: No tenderness.   Neurological:      Mental Status: She is alert.

## 2024-09-30 ENCOUNTER — NURSE TRIAGE (OUTPATIENT)
Dept: OTHER | Facility: OTHER | Age: 84
End: 2024-09-30

## 2024-09-30 ENCOUNTER — TELEPHONE (OUTPATIENT)
Age: 84
End: 2024-09-30

## 2024-09-30 ENCOUNTER — NURSE TRIAGE (OUTPATIENT)
Age: 84
End: 2024-09-30

## 2024-09-30 ENCOUNTER — OFFICE VISIT (OUTPATIENT)
Age: 84
End: 2024-09-30
Payer: MEDICARE

## 2024-09-30 VITALS
DIASTOLIC BLOOD PRESSURE: 84 MMHG | HEART RATE: 86 BPM | SYSTOLIC BLOOD PRESSURE: 124 MMHG | WEIGHT: 161 LBS | HEIGHT: 62 IN | BODY MASS INDEX: 29.63 KG/M2 | TEMPERATURE: 97.8 F | OXYGEN SATURATION: 92 %

## 2024-09-30 DIAGNOSIS — J40 BRONCHITIS: Primary | ICD-10-CM

## 2024-09-30 DIAGNOSIS — J20.9 CHRONIC BRONCHITIS WITH ACUTE EXACERBATION (HCC): Primary | ICD-10-CM

## 2024-09-30 DIAGNOSIS — G47.33 OSA ON CPAP: ICD-10-CM

## 2024-09-30 DIAGNOSIS — J42 CHRONIC BRONCHITIS WITH ACUTE EXACERBATION (HCC): Primary | ICD-10-CM

## 2024-09-30 PROCEDURE — G2211 COMPLEX E/M VISIT ADD ON: HCPCS | Performed by: PHYSICIAN ASSISTANT

## 2024-09-30 PROCEDURE — 99214 OFFICE O/P EST MOD 30 MIN: CPT | Performed by: PHYSICIAN ASSISTANT

## 2024-09-30 RX ORDER — IPRATROPIUM BROMIDE AND ALBUTEROL SULFATE 2.5; .5 MG/3ML; MG/3ML
3 SOLUTION RESPIRATORY (INHALATION) 4 TIMES DAILY
Qty: 120 ML | Refills: 0 | Status: SHIPPED | OUTPATIENT
Start: 2024-09-30 | End: 2024-10-07 | Stop reason: SDUPTHER

## 2024-09-30 RX ORDER — PREDNISONE 20 MG/1
TABLET ORAL
Qty: 11 TABLET | Refills: 0 | Status: SHIPPED | OUTPATIENT
Start: 2024-09-30 | End: 2024-10-09 | Stop reason: SDUPTHER

## 2024-09-30 RX ORDER — PROMETHAZINE HYDROCHLORIDE AND CODEINE PHOSPHATE 6.25; 1 MG/5ML; MG/5ML
5 SYRUP ORAL EVERY 4 HOURS PRN
Qty: 473 ML | Refills: 0 | Status: SHIPPED | OUTPATIENT
Start: 2024-09-30

## 2024-09-30 RX ORDER — CIPROFLOXACIN 500 MG/1
500 TABLET, FILM COATED ORAL EVERY 12 HOURS SCHEDULED
Qty: 10 TABLET | Refills: 0 | Status: SHIPPED | OUTPATIENT
Start: 2024-09-30 | End: 2024-10-05

## 2024-09-30 NOTE — TELEPHONE ENCOUNTER
Patient has bronchial asthma.  She states that she has a very bad cough.  When she starts coughing, it's hard to stop.  She feels it's getting worse.  She is hoping to get in just to have someone listen to her lungs.  No availability on schedules and could not get through to the office.  Please contact patient and advise.  Thank you.

## 2024-09-30 NOTE — PROGRESS NOTES
"Assessment/Plan:   Diagnoses and all orders for this visit:    Chronic bronchitis with acute exacerbation (HCC)  -     ipratropium-albuterol (DUO-NEB) 0.5-2.5 mg/3 mL nebulizer solution; Take 3 mL by nebulization 4 (four) times a day  -     predniSONE 20 mg tablet; Take 2 tablets (40 mg total) by mouth daily for 3 days, THEN 1 tablet (20 mg total) daily for 3 days, THEN 0.5 tablets (10 mg total) daily for 3 days.    CHUYITA on CPAP        Patient is here today for sick visit.  She is completing Augmentin that was given to her by her PCP, completed a Medrol Dosepak and has continued cough.  She did feel better with the DuoNeb that was given to her at the urgent care.  Will give her additional steroids with a prednisone taper and send DuoNeb to the pharmacy which she can use 4 times per day as needed.  She will continue with her Symbicort twice per day.  She has been using her CPAP but having difficulty since being sick. She will hold off on the CPAP for now.     She will follow-up with us as scheduled in November or sooner if necessary.  No follow-ups on file.  All questions are answered to the patient's satisfaction and understanding.  She verbalizes understanding.  She is encouraged to call with any further questions or concerns.    Portions of the record may have been created with voice recognition software.  Occasional wrong word or \"sound a like\" substitutions may have occurred due to the inherent limitations of voice recognition software.  Read the chart carefully and recognize, using context, where substitutions have occurred.    Electronically Signed by Shawn Sepulveda PA-C    ______________________________________________________________________    Chief Complaint:   Chief Complaint   Patient presents with    Shortness of Breath    Cough    Wheezing       Patient ID: Roslyn is a 84 y.o. y.o. female has a past medical history of Anxiety, Arthritis, Asthma, COPD (chronic obstructive pulmonary disease) (HCC), " COVID (06/2022), CPAP (continuous positive airway pressure) dependence, Depression, Disease of thyroid gland, GERD (gastroesophageal reflux disease), History of transfusion, Hyperlipidemia, Hypotension, MRSA (methicillin resistant Staphylococcus aureus) infection, Shortness of breath, Sleep apnea, Sleep apnea, obstructive, Use of cane as ambulatory aid, Wears glasses, and Wrist fracture.    9/30/2024  Patient presents today for follow-up visit.  Patient is an 84-year-old female former smoker who quit over 20 years ago with past medical history of asthma, COPD, seasonal environmental allergies, hiatal hernia, GERD, hypertension, obesity, CHUYITA.    She is here today for sick visit.  She was seen by her PCP on 9/24/2024 and was given Augmentin and a Medrol Dosepak.  She was then seen at urgent care on 9/27/2024 and was given a DuoNeb in the office which helped significantly with her symptoms.  She felt better for that day and the next day began to have increasing cough again.  She is using her Symbicort twice per day, is completing her Augmentin tomorrow, she finished the Medrol Dosepak.    Shortness of Breath  Associated symptoms include coughing and wheezing.   Cough  Associated symptoms include shortness of breath and wheezing.   Wheezing  Associated symptoms include coughing and wheezing.       Review of Systems   Constitutional: Negative.    HENT: Negative.     Respiratory:  Positive for cough, shortness of breath and wheezing.    Cardiovascular: Negative.    Gastrointestinal: Negative.    Genitourinary: Negative.    Musculoskeletal: Negative.    Skin: Negative.    Allergic/Immunologic: Negative.    Neurological: Negative.    Psychiatric/Behavioral: Negative.         Smoking history: She reports that she quit smoking about 24 years ago. Her smoking use included cigarettes. She started smoking about 54 years ago. She has a 7.5 pack-year smoking history. She has never been exposed to tobacco smoke. She has never used  smokeless tobacco.    The following portions of the patient's history were reviewed and updated as appropriate: allergies, current medications, past family history, past medical history, past social history, past surgical history, and problem list.    Immunization History   Administered Date(s) Administered    COVID-19 MODERNA VACC 0.5 ML IM 02/08/2021, 03/09/2021, 10/24/2021, 04/08/2022    COVID-19 Pfizer Vac BIVALENT Edward-sucrose 12 Yr+ IM 12/13/2022    COVID-19 Pfizer mRNA vacc PF edward-sucrose 12 yr and older (Comirnaty) 10/01/2023, 09/23/2024    INFLUENZA 10/24/2016, 08/16/2018, 10/18/2022, 10/01/2023    Influenza Split High Dose Preservative Free IM 09/12/2012, 09/13/2017, 08/16/2018, 08/27/2020    Influenza, Seasonal Vaccine, Quadrivalent, Adjuvanted, .5e 10/18/2022    Influenza, high dose seasonal 0.7 mL 08/27/2020    Influenza, seasonal, injectable 1941    Pneumococcal Conjugate 13-Valent 03/23/2016    Pneumococcal Polysaccharide PPV23 1941, 01/01/2014    Respiratory Syncytial Virus Vaccine (Recombinant, Adjuvanted) 01/29/2024    Tdap 1941, 1941, 09/26/2016    Zoster 1940, 1941    Zoster Vaccine Recombinant 08/16/2018, 01/25/2019    influenza, trivalent, adjuvanted 09/13/2019     Current Outpatient Medications   Medication Sig Dispense Refill    albuterol (ACCUNEB) 0.63 MG/3ML nebulizer solution Take 3 mL (0.63 mg total) by nebulization every 6 (six) hours as needed for wheezing or shortness of breath 1080 mL 3    albuterol (PROVENTIL HFA,VENTOLIN HFA) 90 mcg/act inhaler Inhale 1 puff every 6 (six) hours as needed for wheezing 18 g 3    amoxicillin-clavulanate (AUGMENTIN) 875-125 mg per tablet Take 1 tablet by mouth every 12 (twelve) hours for 7 days 14 tablet 0    ciprofloxacin (CIPRO) 500 mg tablet Take 1 tablet (500 mg total) by mouth every 12 (twelve) hours for 5 days 10 tablet 0    famotidine (PEPCID) 40 MG tablet Take 1 tablet (40 mg total) by mouth in the morning  "180 tablet 1    FLUoxetine (PROzac) 40 MG capsule Take 1 capsule (40 mg total) by mouth 2 (two) times a day 60 capsule 0    hydroCHLOROthiazide 25 mg tablet TAKE 1 TABLET BY MOUTH IN THE  MORNING 90 tablet 3    ipratropium-albuterol (DUO-NEB) 0.5-2.5 mg/3 mL nebulizer solution Take 3 mL by nebulization 4 (four) times a day 120 mL 0    levothyroxine 50 mcg tablet Take 50 mcg by mouth daily      methylPREDNISolone 4 MG tablet therapy pack Use as directed on package 21 each 0    montelukast (SINGULAIR) 10 mg tablet TAKE 1 TABLET BY MOUTH DAILY AT  BEDTIME 90 tablet 1    pantoprazole (PROTONIX) 40 mg tablet Take 1 tablet (40 mg total) by mouth daily 30 tablet 2    predniSONE 20 mg tablet Take 2 tablets (40 mg total) by mouth daily for 3 days, THEN 1 tablet (20 mg total) daily for 3 days, THEN 0.5 tablets (10 mg total) daily for 3 days. 11 tablet 0    promethazine-codeine (PHENERGAN WITH CODEINE) 6.25-10 mg/5 mL syrup Take 5 mL by mouth every 4 (four) hours as needed for cough 473 mL 0    simvastatin (ZOCOR) 10 mg tablet TAKE 1 TABLET BY MOUTH DAILY AT  BEDTIME 90 tablet 1    Symbicort 160-4.5 MCG/ACT inhaler USE 2 INHALATIONS BY MOUTH TWICE DAILY . RINSE MOUTH AFTER USE . 30.6 g 1     No current facility-administered medications for this visit.     Allergies: Ascorbate - food allergy    Objective:  Vitals:    09/30/24 1325   BP: 124/84   Pulse: 86   Temp: 97.8 °F (36.6 °C)   SpO2: 92%   Weight: 73 kg (161 lb)   Height: 5' 2\" (1.575 m)   Oxygen Therapy  SpO2: 92 %  .  Wt Readings from Last 3 Encounters:   09/30/24 73 kg (161 lb)   09/24/24 75 kg (165 lb 4 oz)   08/22/24 74.8 kg (164 lb 14.5 oz)     Body mass index is 29.45 kg/m².    Physical Exam  Constitutional:       General: She is not in acute distress.     Appearance: Normal appearance. She is well-developed. She is not ill-appearing.   HENT:      Head: Normocephalic and atraumatic.      Mouth/Throat:      Pharynx: Oropharynx is clear.   Eyes:      Pupils: Pupils " are equal, round, and reactive to light.   Cardiovascular:      Rate and Rhythm: Normal rate and regular rhythm.   Pulmonary:      Effort: Pulmonary effort is normal. No respiratory distress.      Breath sounds: Wheezing present. No decreased breath sounds, rhonchi or rales.   Abdominal:      General: Abdomen is flat. There is no distension.   Musculoskeletal:         General: Normal range of motion.      Cervical back: Normal range of motion.      Right lower leg: No edema.      Left lower leg: No edema.   Skin:     General: Skin is warm and dry.      Findings: No rash.   Neurological:      Mental Status: She is alert and oriented to person, place, and time.   Psychiatric:         Mood and Affect: Mood normal.         Behavior: Behavior normal.         Lab Review:   Lab Results   Component Value Date     05/19/2015    K 3.6 12/14/2023    K 3.7 04/20/2023    CL 98 12/14/2023     04/20/2023    CO2 30 12/14/2023    CO2 31 04/20/2023    BUN 20 12/14/2023    BUN 16 04/20/2023    CREATININE 0.85 12/14/2023    CREATININE 0.80 04/20/2023    GLUCOSE 107 (H) 05/19/2015    CALCIUM 9.4 12/14/2023    CALCIUM 8.6 04/20/2023     Lab Results   Component Value Date    WBC 5.54 12/14/2023    WBC 4.7 05/19/2015    HGB 15.1 12/14/2023    HGB 15.2 05/19/2015    HCT 45.7 12/14/2023    HCT 44.8 05/19/2015    MCV 91 12/14/2023    MCV 89 05/19/2015     12/14/2023     05/19/2015       Diagnostics:    Chest x-ray: clear  Office Spirometry Results:     ESS:    XR chest pa and lateral    Result Date: 9/27/2024  Narrative: XR CHEST PA AND LATERAL INDICATION: R05.1: Acute cough. Former smoker. COMPARISON: Chest x-ray from 7/10/2024. FINDINGS: Clear lungs. No pneumothorax or pleural effusion. Normal cardiomediastinal silhouette. Age-related degenerative changes in the spine. Normal upper abdomen.     Impression: No acute cardiopulmonary disease. Workstation performed: [id number]

## 2024-09-30 NOTE — TELEPHONE ENCOUNTER
"  Patient call:  Pt stated provider: Dr. Silvestre // EBONY Sepulveda    Actionable item: Provider review and appointment scheduled.    What is the reason for the call/chief complaint?    Reports slightly reduced SPO2 @92% and SOB. Experiencing thick mucous(clear and sometimes yellow) cough which has \"moved down\" and making it difficult for her to take a deep breath.    PCP prescribed abx and steroids but patient continues with symptoms and is growing concerned.     Dispo: Appointment scheduled for today. Routing to provider for review and recommendation. Maintain hydration, minimize exertion, and actively expectorate mucous. Can take OTC Mucinex.  Informed to call [facility] with worsening symptoms.  Agrees with plan.   All questions answered.     Reason for Disposition   MILD difficulty breathing (e.g., minimal/no SOB at rest, SOB with walking, pulse < 100) of new-onset or worse than normal    Answer Assessment - Initial Assessment Questions  1. RESPIRATORY STATUS: \"Describe your breathing?\" (e.g., wheezing, shortness of breath, unable to speak, severe coughing)       Can't take a deep breath  2. ONSET: \"When did this breathing problem begin?\"       5-6 days ago  3. PATTERN \"Does the difficult breathing come and go, or has it been constant since it started?\"       constant  4. SEVERITY: \"How bad is your breathing?\" (e.g., mild, moderate, severe)     - MILD: No SOB at rest, mild SOB with walking, speaks normally in sentences, can lay down, no retractions, pulse < 100.     - MODERATE: SOB at rest, SOB with minimal exertion and prefers to sit, cannot lie down flat, speaks in phrases, mild retractions, audible wheezing, pulse 100-120.     - SEVERE: Very SOB at rest, speaks in single words, struggling to breathe, sitting hunched forward, retractions, pulse > 120       worsened  5. RECURRENT SYMPTOM: \"Have you had difficulty breathing before?\" If Yes, ask: \"When was the last time?\" and \"What happened that time?\"       -  6. " "CARDIAC HISTORY: \"Do you have any history of heart disease?\" (e.g., heart attack, angina, bypass surgery, angioplasty)       Please see chart  7. LUNG HISTORY: \"Do you have any history of lung disease?\"  (e.g., pulmonary embolus, asthma, emphysema)      Please see chart  8. CAUSE: \"What do you think is causing the breathing problem?\"       unsure  9. OTHER SYMPTOMS: \"Do you have any other symptoms? (e.g., dizziness, runny nose, cough, chest pain, fever)      Mucous, cough, can't take a deep breath    Protocols used: Breathing Difficulty-ADULT-[place]    "

## 2024-10-01 DIAGNOSIS — J40 BRONCHITIS: Primary | ICD-10-CM

## 2024-10-01 DIAGNOSIS — R05.1 ACUTE COUGH: ICD-10-CM

## 2024-10-01 RX ORDER — BENZONATATE 200 MG/1
200 CAPSULE ORAL 3 TIMES DAILY PRN
Qty: 20 CAPSULE | Refills: 0 | Status: SHIPPED | OUTPATIENT
Start: 2024-10-01

## 2024-10-01 NOTE — TELEPHONE ENCOUNTER
Roslyn called in to state she is seeing a pulmonologist and that is why she cancelled he appointment tomorrow.

## 2024-10-01 NOTE — TELEPHONE ENCOUNTER
"Regarding: Medication question / Concern  ----- Message from Beronica OLIVER sent at 9/30/2024  6:55 PM EDT -----  \" I'm taking a medication ciprofloxacin (CIPRO) 500 mg tablet and It says its not for people over 65 and I'm 84\"    "

## 2024-10-01 NOTE — TELEPHONE ENCOUNTER
"Reason for Disposition  • Caller has already spoken with another triager and has no further questions.    Additional Information  • Caller has already spoken to PCP (doctor or NP/PA) or another triager    Answer Assessment - Initial Assessment Questions  1. REASON FOR CALL: \"What is the main reason for your call?\" or \"How can I best help you?\"      Cipro question- is it okay to take at my age?  2. SYMPTOMS : \"Do you have any symptoms?\"       no  3. OTHER QUESTIONS: \"Do you have any other questions?\"      no    Protocols used: Information Only Call - No Triage-Adult-, No Contact or Duplicate Contact Call-Adult-    "

## 2024-10-04 NOTE — TELEPHONE ENCOUNTER
Incoming call:        Patient with persistent symptoms. Previously triaged and seen in office on 9/30. Was feeling a bit discouraged with length of course.   States that her mucous is no longer in her chest and is feeling better with expectorating but wanted some encouragement.     Education and encouragement provided with course. Educated on nebulizing frequency and to maintain consistency.     Advised to reach out should symptoms worsen or persist through next week. Asked that I route to EBONY Sepulveda

## 2024-10-07 ENCOUNTER — NURSE TRIAGE (OUTPATIENT)
Age: 84
End: 2024-10-07

## 2024-10-07 DIAGNOSIS — J20.9 CHRONIC BRONCHITIS WITH ACUTE EXACERBATION (HCC): ICD-10-CM

## 2024-10-07 DIAGNOSIS — J42 CHRONIC BRONCHITIS WITH ACUTE EXACERBATION (HCC): ICD-10-CM

## 2024-10-07 RX ORDER — IPRATROPIUM BROMIDE AND ALBUTEROL SULFATE 2.5; .5 MG/3ML; MG/3ML
3 SOLUTION RESPIRATORY (INHALATION) 4 TIMES DAILY
Qty: 120 ML | Refills: 0 | Status: CANCELLED | OUTPATIENT
Start: 2024-10-07

## 2024-10-07 RX ORDER — IPRATROPIUM BROMIDE AND ALBUTEROL SULFATE 2.5; .5 MG/3ML; MG/3ML
3 SOLUTION RESPIRATORY (INHALATION) 4 TIMES DAILY
Qty: 360 ML | Refills: 0 | Status: SHIPPED | OUTPATIENT
Start: 2024-10-07

## 2024-10-07 NOTE — TELEPHONE ENCOUNTER
"Reason for Disposition  • Cough with no complications    Answer Assessment - Initial Assessment Questions  1. ONSET: \"When did the cough begin?\"       About 2 weeks ago  2. SEVERITY: \"How bad is the cough today?\"       Unspecified  3. SPUTUM: \"Describe the color of your sputum\" (none, dry cough; clear, white, yellow, green)      Clear  4. HEMOPTYSIS: \"Are you coughing up any blood?\" If so ask: \"How much?\" (flecks, streaks, tablespoons, etc.)      Denies hemoptysis  5. DIFFICULTY BREATHING: \"Are you having difficulty breathing?\" If Yes, ask: \"How bad is it?\" (e.g., mild, moderate, severe)     - MILD: No SOB at rest, mild SOB with walking, speaks normally in sentences, can lay down, no retractions, pulse < 100.     - MODERATE: SOB at rest, SOB with minimal exertion and prefers to sit, cannot lie down flat, speaks in phrases, mild retractions, audible wheezing, pulse 100-120.     - SEVERE: Very SOB at rest, speaks in single words, struggling to breathe, sitting hunched forward, retractions, pulse > 120       SOB worse than baseline-but improving since she saw Shawn on 9/30  6. FEVER: \"Do you have a fever?\" If Yes, ask: \"What is your temperature, how was it measured, and when did it start?\"      Denies  7. CARDIAC HISTORY: \"Do you have any history of heart disease?\" (e.g., heart attack, congestive heart failure)       See chart  8. LUNG HISTORY: \"Do you have any history of lung disease?\"  (e.g., pulmonary embolus, asthma, emphysema)      CHUYITA  9. PE RISK FACTORS: \"Do you have a history of blood clots?\" (or: recent major surgery, recent prolonged travel, bedridden)      None specified  10. OTHER SYMPTOMS: \"Do you have any other symptoms?\" (e.g., runny nose, wheezing, chest pain)        Musculoskeletal   11. OTHER         Pt states symptoms are improving    Protocols used: Cough-ADULT-[place]    "

## 2024-10-07 NOTE — TELEPHONE ENCOUNTER
Pt stated Pulm Provider: Dr. Silvestre    Actionable item: Refill duoneb    What is the reason for the call/chief complaint?    Pt calling for persistent cough. Pt saw Shawn in the office on 9/30 for acute on chronic bronchitis. She has now completed steroid and antibiotics. Continues on dueneb treatments. Cough is occasionally productive for clear sputum. SOB still worse than baseline but improving since she saw Shawn on 9/30. Denies fever. Recommended pt continue her nebulizer treatments as she does find them to be helpful. Also recommended she restart her Symbicort as she had stopped this while on the nebulizer. She states robitussin is effective so recommended she continue that for now. Also informed her that PCP ordered phenergan which she can try for the cough as well. Recommended pt call us back for any worsening of symptoms.

## 2024-10-08 ENCOUNTER — APPOINTMENT (OUTPATIENT)
Dept: RADIOLOGY | Facility: CLINIC | Age: 84
End: 2024-10-08
Payer: MEDICARE

## 2024-10-08 ENCOUNTER — OFFICE VISIT (OUTPATIENT)
Dept: URGENT CARE | Facility: CLINIC | Age: 84
End: 2024-10-08
Payer: MEDICARE

## 2024-10-08 ENCOUNTER — NURSE TRIAGE (OUTPATIENT)
Age: 84
End: 2024-10-08

## 2024-10-08 VITALS
RESPIRATION RATE: 18 BRPM | SYSTOLIC BLOOD PRESSURE: 112 MMHG | DIASTOLIC BLOOD PRESSURE: 58 MMHG | TEMPERATURE: 97.2 F | OXYGEN SATURATION: 98 % | BODY MASS INDEX: 29.63 KG/M2 | HEART RATE: 92 BPM | WEIGHT: 162 LBS

## 2024-10-08 DIAGNOSIS — R05.1 ACUTE COUGH: ICD-10-CM

## 2024-10-08 DIAGNOSIS — J42 CHRONIC BRONCHITIS, UNSPECIFIED CHRONIC BRONCHITIS TYPE (HCC): Primary | ICD-10-CM

## 2024-10-08 PROCEDURE — 99213 OFFICE O/P EST LOW 20 MIN: CPT | Performed by: PHYSICIAN ASSISTANT

## 2024-10-08 PROCEDURE — G0463 HOSPITAL OUTPT CLINIC VISIT: HCPCS | Performed by: PHYSICIAN ASSISTANT

## 2024-10-08 PROCEDURE — 71046 X-RAY EXAM CHEST 2 VIEWS: CPT

## 2024-10-08 RX ORDER — PREDNISONE 20 MG/1
TABLET ORAL
Qty: 18 TABLET | Refills: 0 | Status: SHIPPED | OUTPATIENT
Start: 2024-10-08 | End: 2024-10-09 | Stop reason: SDUPTHER

## 2024-10-08 NOTE — PATIENT INSTRUCTIONS
X-ray of the chest provider read-no obvious change from previous chest x-ray taken on 9/27/2024.    Recommended continued use of nebulizer and another round of oral steroids.   Advised to follow up with pulmonology.     Follow up with PCP in 3-5 days.  Proceed to  ER if symptoms worsen.    If tests are performed, our office will contact you with results only if changes need to made to the care plan discussed with you at the visit. You can review your full results on [facility].

## 2024-10-08 NOTE — TELEPHONE ENCOUNTER
Regarding: Cough/sob  ----- Message from Anitha LOCK sent at 10/8/2024  8:06 AM EDT -----  Patient calling with symptoms of cough, phlegm, SOB, fatigue.  Patient has had 2 rounds of steroids and antibiotics and using nebulizer with no improvement.  Patient afraid she may have pneumonia

## 2024-10-08 NOTE — TELEPHONE ENCOUNTER
Pt had called in and spoke with PEP. States symptoms not improving.    Called and spoke with pt. Pt states she feels she is more SOB than yesterday. She has obvious conversational dyspnea compared to when I spoke with her yesterday she had none. Still denies fevers or purulent sputum. No available sick visits today. Recommended pt seek care in nearby [facility]. She is agreeable and has one nearby.

## 2024-10-08 NOTE — PROGRESS NOTES
[facility]        NAME: Roslyn Manzo is a 84 y.o. female  : 1940    MRN: [id number]  DATE: 2024  TIME: 10:30 AM    Assessment and Plan   Chronic bronchitis, unspecified chronic bronchitis type (HCC) [J42]  1. Chronic bronchitis, unspecified chronic bronchitis type (HCC)  predniSONE 20 mg tablet      2. Acute cough  XR chest pa and lateral            Patient Instructions     Patient Instructions   X-ray of the chest provider read-no obvious change from previous chest x-ray taken on 2024.    Recommended continued use of nebulizer and another round of oral steroids.   Advised to follow up with pulmonology.     Follow up with PCP in 3-5 days.  Proceed to  ER if symptoms worsen.    If tests are performed, our office will contact you with results only if changes need to made to the care plan discussed with you at the visit. You can review your full results on [facility].        Chief Complaint     Chief Complaint   Patient presents with    Cough     Pt c/o cough and broncihial asthma and has not gotten better since last time was told to come back by other dr to get heart listened to         History of Present Illness       Patient presents today for reevaluation of cough, shortness of breath, congestion.  She was seen originally on 2024.  She states that antibiotics, steroids, and her inhaler have not been helping her.  She did call her pulmonologist today and they recommended that she be seen again for evaluation.  She is concerned that she may have pneumonia.        Review of Systems   Review of Systems   HENT:  Positive for congestion. Negative for postnasal drip.    Respiratory:  Positive for cough, chest tightness and shortness of breath.    All other systems reviewed and are negative.        Current Medications       Current Outpatient Medications:     albuterol (ACCUNEB) 0.63 MG/3ML nebulizer solution, Take 3 mL (0.63 mg total) by nebulization every 6 (six) hours  as needed for wheezing or shortness of breath, Disp: 1080 mL, Rfl: 3    albuterol (PROVENTIL HFA,VENTOLIN HFA) 90 mcg/act inhaler, Inhale 1 puff every 6 (six) hours as needed for wheezing, Disp: 18 g, Rfl: 3    famotidine (PEPCID) 40 MG tablet, Take 1 tablet (40 mg total) by mouth in the morning, Disp: 180 tablet, Rfl: 1    FLUoxetine (PROzac) 40 MG capsule, Take 1 capsule (40 mg total) by mouth 2 (two) times a day, Disp: 60 capsule, Rfl: 0    hydroCHLOROthiazide 25 mg tablet, TAKE 1 TABLET BY MOUTH IN THE  MORNING, Disp: 90 tablet, Rfl: 3    ipratropium-albuterol (DUO-NEB) 0.5-2.5 mg/3 mL nebulizer solution, Take 3 mL by nebulization 4 (four) times a day, Disp: 360 mL, Rfl: 0    levothyroxine 50 mcg tablet, Take 50 mcg by mouth daily, Disp: , Rfl:     montelukast (SINGULAIR) 10 mg tablet, TAKE 1 TABLET BY MOUTH DAILY AT  BEDTIME, Disp: 90 tablet, Rfl: 1    pantoprazole (PROTONIX) 40 mg tablet, Take 1 tablet (40 mg total) by mouth daily, Disp: 30 tablet, Rfl: 2    predniSONE 20 mg tablet, Take 3 tablets (60 mg total) by mouth daily for 3 days, THEN 2 tablets (40 mg total) daily for 3 days, THEN 1 tablet (20 mg total) daily for 3 days., Disp: 18 tablet, Rfl: 0    promethazine-codeine (PHENERGAN WITH CODEINE) 6.25-10 mg/5 mL syrup, Take 5 mL by mouth every 4 (four) hours as needed for cough, Disp: 473 mL, Rfl: 0    simvastatin (ZOCOR) 10 mg tablet, TAKE 1 TABLET BY MOUTH DAILY AT  BEDTIME, Disp: 90 tablet, Rfl: 1    Symbicort 160-4.5 MCG/ACT inhaler, USE 2 INHALATIONS BY MOUTH TWICE DAILY . RINSE MOUTH AFTER USE ., Disp: 30.6 g, Rfl: 1    benzonatate (TESSALON) 200 MG capsule, Take 1 capsule (200 mg total) by mouth 3 (three) times a day as needed for cough (Patient not taking: Reported on 10/7/2024), Disp: 20 capsule, Rfl: 0    methylPREDNISolone 4 MG tablet therapy pack, Use as directed on package (Patient not taking: Reported on 10/7/2024), Disp: 21 each, Rfl: 0    predniSONE 20 mg tablet, Take 2 tablets (40 mg  total) by mouth daily for 3 days, THEN 1 tablet (20 mg total) daily for 3 days, THEN 0.5 tablets (10 mg total) daily for 3 days. (Patient not taking: Reported on 10/7/2024), Disp: 11 tablet, Rfl: 0    Current Allergies     Allergies as of 10/08/2024 - Reviewed 10/08/2024   Allergen Reaction Noted    Ascorbate - food allergy GI Intolerance 2009            The following portions of the patient's history were reviewed and updated as appropriate: allergies, current medications, past family history, past medical history, past social history, past surgical history and problem list.     Past Medical History:   Diagnosis Date    Anxiety     Arthritis     Asthma     COPD (chronic obstructive pulmonary disease) ([facility])     COVID 2022    CPAP (continuous positive airway pressure) dependence     Depression     Disease of thyroid gland     GERD (gastroesophageal reflux disease)     History of transfusion     Hyperlipidemia     Hypotension     MRSA (methicillin resistant Staphylococcus aureus) infection     about 15yrs ago    Shortness of breath     HUNTER    Sleep apnea     Sleep apnea, obstructive     Use of cane as ambulatory aid     sometimes    Wears glasses     Wrist fracture        Past Surgical History:   Procedure Laterality Date    BACK SURGERY  2017     SECTION      x3    COLONOSCOPY      DILATION AND EVACUATION      miscarriage    EGD      INCONTINENCE SURGERY      JOINT REPLACEMENT Left     left knee sugery x2    [place] NSL/SINUS NDSC MAX ANTROST W/RMVL TISS MAX SINUS N/A 2022    Procedure: FUNCTIONAL ENDOSCOPIC SINUS SURGERY (FESS) IMAGED GUIDED LEFT MAXILLARY ANTROSTOMY ETHMOIDECTOMY FRONTAL SINUSOTOMY WITH DRAINAGE OF FRONTAL SINUS MUCOCELE;  Surgeon: Darryl Plunkett MD;  Location: [facility];  Service: ENT    REPLACEMENT TOTAL KNEE BILATERAL      SINUS SURGERY      TUBAL LIGATION         Family History   Problem Relation Age of Onset    No Known Problems Mother     No Known Problems Father     No  Known Problems Sister     No Known Problems Brother     No Known Problems Maternal Grandmother     No Known Problems Maternal Grandfather     No Known Problems Paternal Grandmother     No Known Problems Paternal Grandfather     No Known Problems Maternal Aunt     No Known Problems Maternal Uncle     No Known Problems Paternal Aunt     No Known Problems Paternal Uncle     No Known Problems Cousin          Medications have been verified.        Objective   /58   Pulse 92   Temp (!) 97.2 °F (36.2 °C) (Tympanic)   Resp 18   Wt 73.5 kg (162 lb)   SpO2 98%   BMI 29.63 kg/m²        Physical Exam     Physical Exam  Vitals and nursing note reviewed.   Constitutional:       Appearance: Normal appearance.   HENT:      Right Ear: Tympanic membrane, ear canal and external ear normal.      Left Ear: Tympanic membrane, ear canal and external ear normal.      Nose: Nose normal.      Mouth/Throat:      Mouth: Mucous membranes are moist.   Cardiovascular:      Rate and Rhythm: Normal rate and regular rhythm.   Pulmonary:      Effort: Pulmonary effort is normal. No prolonged expiration or respiratory distress.      Breath sounds: Examination of the right-upper field reveals wheezing. Examination of the left-upper field reveals wheezing. Examination of the right-lower field reveals wheezing. Examination of the left-lower field reveals wheezing. Wheezing (Slight expiratory wheeze) present.   Skin:     General: Skin is warm and dry.   Neurological:      General: No focal deficit present.      Mental Status: She is alert and oriented to person, place, and time.   Psychiatric:         Mood and Affect: Mood normal.         Behavior: Behavior normal.

## 2024-10-09 ENCOUNTER — TELEPHONE (OUTPATIENT)
Age: 84
End: 2024-10-09

## 2024-10-09 DIAGNOSIS — J42 CHRONIC BRONCHITIS, UNSPECIFIED CHRONIC BRONCHITIS TYPE (HCC): ICD-10-CM

## 2024-10-09 RX ORDER — PREDNISONE 20 MG/1
TABLET ORAL
Qty: 18 TABLET | Refills: 0 | Status: SHIPPED | OUTPATIENT
Start: 2024-10-09 | End: 2024-10-17

## 2024-10-09 NOTE — TELEPHONE ENCOUNTER
Patient called to inform PCP that she was given a script for prednisone yesterday. However she has misplace the medication she has call Urgent care and they are suggestion she call PCP. She is willing to pay out of pocket she just cannot find the med. Please advise.

## 2024-10-15 ENCOUNTER — TELEPHONE (OUTPATIENT)
Age: 84
End: 2024-10-15

## 2024-10-15 NOTE — TELEPHONE ENCOUNTER
Pt states she is starting to feel much better. She is almost done with her steroids. She is wondering if ok to stop the nebulizer treatments. Informed she can continue the nebulizer treatments as needed. Does not have to continue them daily. She is aware to continue her symbicort. No further questions/concerns at this time.

## 2024-10-28 ENCOUNTER — TELEPHONE (OUTPATIENT)
Age: 84
End: 2024-10-28

## 2024-10-28 ENCOUNTER — APPOINTMENT (OUTPATIENT)
Dept: RADIOLOGY | Facility: CLINIC | Age: 84
End: 2024-10-28
Payer: MEDICARE

## 2024-10-28 ENCOUNTER — OFFICE VISIT (OUTPATIENT)
Dept: FAMILY MEDICINE CLINIC | Facility: CLINIC | Age: 84
End: 2024-10-28
Payer: MEDICARE

## 2024-10-28 VITALS
SYSTOLIC BLOOD PRESSURE: 116 MMHG | WEIGHT: 166 LBS | RESPIRATION RATE: 18 BRPM | HEIGHT: 62 IN | HEART RATE: 67 BPM | OXYGEN SATURATION: 99 % | BODY MASS INDEX: 30.55 KG/M2 | DIASTOLIC BLOOD PRESSURE: 76 MMHG
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
## 2024-10-28 DIAGNOSIS — M25.551 PAIN OF RIGHT HIP: ICD-10-CM

## 2024-10-28 PROCEDURE — 73521 X-RAY EXAM HIPS BI 2 VIEWS: CPT

## 2024-10-28 PROCEDURE — G2211 COMPLEX E/M VISIT ADD ON: HCPCS | Performed by: STUDENT IN AN ORGANIZED HEALTH CARE EDUCATION/TRAINING PROGRAM

## 2024-10-28 PROCEDURE — 99213 OFFICE O/P EST LOW 20 MIN: CPT | Performed by: STUDENT IN AN ORGANIZED HEALTH CARE EDUCATION/TRAINING PROGRAM

## 2024-10-28 PROCEDURE — 72220 X-RAY EXAM SACRUM TAILBONE: CPT

## 2024-10-28 RX ORDER — NAPROXEN 500 MG/1
500 TABLET ORAL 2 TIMES DAILY WITH MEALS
Qty: 30 TABLET | Refills: 0 | Status: SHIPPED | OUTPATIENT
Start: 2024-10-28

## 2024-10-28 NOTE — TELEPHONE ENCOUNTER
Patient called in to schedule for appt today.    fallen 3 days ago -- fall backwards bump head, taking tylenol ice in alot of pain. Had fallen from neighbor let her dog loose when patient was walking her dog, got tangled in the mix.    Please call patient, she would like an appt for today on schedule available for a 9:40am takes patient 30 mins to travel to office.      Please advise. Thank you.

## 2024-10-28 NOTE — ASSESSMENT & PLAN NOTE
Orders:    XR hips bilateral 3-4 vw w pelvis if performed; Future    XR sacrum and coccyx; Future    naproxen (Naprosyn) 500 mg tablet; Take 1 tablet (500 mg total) by mouth 2 (two) times a day with meals

## 2024-10-28 NOTE — PROGRESS NOTES
Ambulatory Visit  Name: Roslyn Manzo      : 1940      MRN: [id number]  Encounter Provider: Lelia Mccord DO  Encounter Date: 10/28/2024   Encounter department: [facility]    Assessment & Plan  Fall, initial encounter    Orders:    XR hips bilateral 3-4 vw w pelvis if performed; Future    XR sacrum and coccyx; Future    naproxen (Naprosyn) 500 mg tablet; Take 1 tablet (500 mg total) by mouth 2 (two) times a day with meals    Pain of right hip  Mechanical fall 1 week ago.  Patient reports she fell straight back and hit head, subsequently started having right sided hip pain on the next day which has now progressed into left-sided hip pain as well.  Patient has difficulty with getting in and out of bed but has been able to ambulate once up from a sitting/lying position.    Will start naproxen 500 mg twice daily with meals.  Will check bilateral hip x-ray as well as sacral x-ray.    Orders:    naproxen (Naprosyn) 500 mg tablet; Take 1 tablet (500 mg total) by mouth 2 (two) times a day with meals       History of Present Illness     Fall  The accident occurred 5 to 7 days ago. The fall occurred while standing. She fell from a height of 3 to 5 ft. She landed on Grass. There was no blood loss. The point of impact was the head and buttocks. The pain is present in the right hip and left hip. The pain is at a severity of 8/10. The pain is moderate. The symptoms are aggravated by pressure on injury and movement. Pertinent negatives include no abdominal pain, fever, headaches, hematuria, loss of consciousness, nausea, numbness, tingling, visual change or vomiting. She has tried acetaminophen for the symptoms. The treatment provided no relief.         Review of Systems   Constitutional:  Negative for chills and fever.   HENT:  Negative for ear pain and sore throat.    Eyes:  Negative for pain and visual disturbance.   Respiratory:  Negative for cough and shortness of breath.   "  Cardiovascular:  Negative for chest pain and palpitations.   Gastrointestinal:  Negative for abdominal pain, nausea and vomiting.   Genitourinary:  Negative for dysuria and hematuria.   Musculoskeletal:  Positive for back pain. Negative for arthralgias.        Butt pain   Skin:  Negative for color change and rash.   Neurological:  Negative for tingling, seizures, loss of consciousness, syncope, numbness and headaches.   All other systems reviewed and are negative.          Objective     /76 (BP Location: Left arm, Patient Position: Sitting, Cuff Size: Standard)   Pulse 67   Resp 18   Ht 5' 2\" (1.575 m)   Wt 75.3 kg (166 lb)   SpO2 99%   BMI 30.36 kg/m²     Physical Exam  Vitals reviewed.   Constitutional:       General: She is not in acute distress.     Appearance: Normal appearance. She is well-developed.   HENT:      Head: Normocephalic and atraumatic.      Mouth/Throat:      Pharynx: No posterior oropharyngeal erythema.   Eyes:      Extraocular Movements: Extraocular movements intact.      Conjunctiva/sclera: Conjunctivae normal.   Cardiovascular:      Rate and Rhythm: Normal rate and regular rhythm.      Heart sounds: No murmur heard.  Pulmonary:      Effort: Pulmonary effort is normal. No respiratory distress.      Breath sounds: Normal breath sounds.   Abdominal:      Palpations: Abdomen is soft.      Tenderness: There is no abdominal tenderness.   Musculoskeletal:         General: Tenderness present. No swelling or deformity.      Cervical back: Neck supple.      Comments: Tender to palpation at right SI joint    Skin:     General: Skin is warm and dry.      Capillary Refill: Capillary refill takes less than 2 seconds.   Neurological:      Mental Status: She is alert and oriented to person, place, and time.   Psychiatric:         Mood and Affect: Mood normal.         Behavior: Behavior normal.         "

## 2024-10-28 NOTE — ASSESSMENT & PLAN NOTE
Mechanical fall 1 week ago.  Patient reports she fell straight back and hit head, subsequently started having right sided hip pain on the next day which has now progressed into left-sided hip pain as well.  Patient has difficulty with getting in and out of bed but has been able to ambulate once up from a sitting/lying position.    Will start naproxen 500 mg twice daily with meals.  Will check bilateral hip x-ray as well as sacral x-ray.    Orders:    naproxen (Naprosyn) 500 mg tablet; Take 1 tablet (500 mg total) by mouth 2 (two) times a day with meals

## 2024-10-30 ENCOUNTER — TELEPHONE (OUTPATIENT)
Age: 84
End: 2024-10-30

## 2024-10-30 NOTE — TELEPHONE ENCOUNTER
"Pt states she was seen recently for fall.  Pt was advised to take Naproxen but states it's not \"cutting it\" for her.  Pt inquiring if OK to take Tylenol as well.  Advised pt that Tylenol is OK to take and no known interactions with Naproxen.  Pt verbalized understanding.  No further questions or concerns at this time.  "

## 2024-11-01 ENCOUNTER — OFFICE VISIT (OUTPATIENT)
Dept: FAMILY MEDICINE CLINIC | Facility: CLINIC | Age: 84
End: 2024-11-01
Payer: MEDICARE

## 2024-11-01 VITALS
TEMPERATURE: 97.5 F | HEART RATE: 81 BPM | BODY MASS INDEX: 30.55 KG/M2 | OXYGEN SATURATION: 95 % | WEIGHT: 166 LBS | DIASTOLIC BLOOD PRESSURE: 68 MMHG | RESPIRATION RATE: 14 BRPM | HEIGHT: 62 IN | SYSTOLIC BLOOD PRESSURE: 112 MMHG

## 2024-11-01 DIAGNOSIS — M54.50 ACUTE RIGHT-SIDED LOW BACK PAIN, UNSPECIFIED WHETHER SCIATICA PRESENT: ICD-10-CM

## 2024-11-01 DIAGNOSIS — M62.830 MUSCLE SPASM OF BACK: Primary | ICD-10-CM

## 2024-11-01 PROCEDURE — 96372 THER/PROPH/DIAG INJ SC/IM: CPT | Performed by: NURSE PRACTITIONER

## 2024-11-01 PROCEDURE — 99214 OFFICE O/P EST MOD 30 MIN: CPT | Performed by: NURSE PRACTITIONER

## 2024-11-01 RX ORDER — METHOCARBAMOL 500 MG/1
500 TABLET, FILM COATED ORAL 2 TIMES DAILY PRN
Qty: 30 TABLET | Refills: 0 | Status: SHIPPED | OUTPATIENT
Start: 2024-11-01

## 2024-11-01 RX ORDER — KETOROLAC TROMETHAMINE 15 MG/ML
15 INJECTION, SOLUTION INTRAMUSCULAR; INTRAVENOUS ONCE
Qty: 1 ML | Refills: 0 | Status: SHIPPED | OUTPATIENT
Start: 2024-11-01 | End: 2024-11-01 | Stop reason: CLARIF

## 2024-11-01 RX ORDER — KETOROLAC TROMETHAMINE 30 MG/ML
30 INJECTION, SOLUTION INTRAMUSCULAR; INTRAVENOUS ONCE
Status: COMPLETED | OUTPATIENT
Start: 2024-11-01 | End: 2024-11-01

## 2024-11-01 RX ADMIN — KETOROLAC TROMETHAMINE 30 MG: 30 INJECTION, SOLUTION INTRAMUSCULAR; INTRAVENOUS at 16:19

## 2024-11-01 NOTE — TELEPHONE ENCOUNTER
Pharmacy called stating they received a prescription today for patient for Ketoralac. Asking if it was received in error and the patient actually received the injection in the office    Please advise

## 2024-11-01 NOTE — TELEPHONE ENCOUNTER
Patient called as she is still in severe pain, she asked if she could take a medication that her friend has to relieve her pain, we advised pt that we would not recommend taking any medication unless it is prescribed to her and only her from her provider. In-order to get the patient the help and advise she needs we were able to warm transfer the call to the office to assist the patient

## 2024-11-01 NOTE — PATIENT INSTRUCTIONS
"Stop Naproxen  Use robaxin twice day for muscle spasm. May take tylenol as needed  Get appt for physical therapy  Continue to use ice alternate with heat. Do not apply directly to skin.   Stretching exercise, muscle rubs   Patient Education     Low back pain in adults   The Basics   Written by the doctors and editors at [facility]   How worried should I be about low back pain? -- Do not assume the worst. Almost everyone gets back pain at some point. Low back pain can be scary. But even when the pain is severe, it usually goes away on its own within a few weeks. The cases that require urgent care or surgery are rare.  See your doctor or nurse if you have back pain and you:   Recently had a fall or an injury to your back   Have numbness or weakness in your legs   Have problems with bladder or bowel control   Have unexplained weight loss   Have a fever or feel sick in other ways   Take steroid medicine, such as prednisone, on a regular basis   Have diabetes or a medical problem that weakens your immune system   Have a history of cancer or osteoporosis  You should also see a doctor if:   Your back pain is so severe that you cannot perform simple tasks   Your back pain does not start to improve within 4 weeks  What are the parts of the back? -- The back is made up of (figure 1):   Vertebrae - These are the bones of the spine. Each has a hole in the center. The vertebrae are stacked to form a hollow tube called the \"spinal canal.\" The spinal cord passes through this tube and is protected by the vertebrae.   Spinal cord and nerves - The spinal cord is the bundle of nerves that connects the brain to the rest of the body. It runs through the vertebrae. Nerves branch from the spinal cord and pass in between the vertebrae. From there, they connect to the arms, the legs, and the organs.   Discs - Rubbery discs sit in between each of the vertebrae. These add cushion and allow movement.   Muscles, tendons, and ligaments - These " "support the vertebrae and are used to stand upright as well as bend and flex the body. They are also called the \"soft tissues\" of the neck and back.  What causes low back pain? -- Many different things can cause low back pain. Most of the time, doctors do not know the exact cause.  Back pain can happen if you strain a muscle. This is often what has happened when a person \"throws out\" their back. This refers to pain that starts suddenly after physical activity, like lifting something heavy or bending over.  Back pain can also happen if you have:   Damaged, bulging, or torn discs   Arthritis affecting the joints of the spine   Bony growths on the vertebrae that crowd nearby nerves   A vertebra out of place   Narrowing in the spinal canal   A tumor or infection (but this is very rare)  Should I get an imaging test? -- Most people do not need an imaging test such an X-ray, CT scan, or MRI. Most cases of back pain go away a few weeks. Doctors usually do not order imaging tests unless there are signs of something unusual.  If your doctor does not order an imaging test, do not worry. They can still learn a lot about your pain just from looking you over and talking with you.  How can the doctor or nurse tell what is wrong just by talking to me? -- Your symptoms tell your doctor or nurse a lot about the cause of your pain. For example:   If your pain started after you did something specific, like lifting a heavy object or twisting your back, you might have strained a muscle   If your pain spreads down the back of 1 thigh, it could be a sign that 1 of the nerves that go to your leg is being pinched by a bulging or torn disc   If your pain goes all the way down both legs, it could be a sign that you have a narrowed spinal canal. This is most often due to bony growths on your spine.  How is back pain treated? -- Most people with an episode of low back pain do not have a serious medical problem, and can try simple treatments " "such as:   Staying active - The best thing you can do is to stay as active as possible. People with low back pain recover faster if they stay active. If your pain is severe, you might need to rest for a day or 2. But it's important to get back to walking and moving as soon as possible. While you should avoid heavy lifting and sports while your back hurts, try to keep doing your normal daily activities.   Heat - Some people find that it helps to use a heating pad or heated wrap. Be careful to avoid high heat settings to prevent skin burns.   Medicines - First, you can try pain medicines that you can get without a prescription. In many cases, doctors suggest first trying a nonsteroidal antiinflammatory drug, or \"NSAID.\" NSAIDs include ibuprofen (sample brand names: Advil, Motrin) and naproxen (sample brand name: Aleve). These might work better than acetaminophen (sample brand name: Tylenol) for back pain.  If non-prescription medicines do not help, let your doctor or nurse know. In some cases, doctors prescribe a medicine to relax the muscles (called a \"muscle relaxant\"). But keep in mind that muscle relaxants are not generally used in people older than 65. In older people, these medicines can cause side effects such as trouble urinating or confusion.   Treatments to help with symptoms - Some treatments might help you feel better for a little while. They include:   Spinal manipulation - This is when a chiropractor, physical therapist, or other professional moves or \"adjusts\" the joints of your back. If you want to try this, talk to your doctor or nurse first.   Acupuncture - This is when someone who knows traditional Chinese medicine inserts tiny needles into your body to block pain signals.   Massage - A massage therapist massages the muscles and other soft tissues in your back.  While back pain usually goes away within a few weeks, some people do continue to have pain for longer. In this case, additional treatments " "might include:   Self-care - This involves being aware of your pain. While you should rest when you need to, it's important to stay active as much as you can. Things like applying heat and doing gentle stretches can help you feel better, too.   Physical therapy - A physical therapist is an exercise expert who can teach you stretches and movements to help strengthen your muscles. The goal is to relieve pain but also help you get back to your normal activities.  Exercises you can try include walking, swimming, or using an exercise bike. Some people also find that ha chi or yoga can help with their back pain. Finding activities you enjoy can help you stay active.   Reducing stress - Some people find that it helps to try something called \"mindfulness-based stress reduction.\" This involves going to a group program to practice relaxation and meditation. If your back pain is making you feel anxious or depressed, talk to your doctor or nurse. There are other treatments that can help with these problems.  Some people wonder if injections (shots) can help to relieve back pain. In some cases, doctors might recommend a shot of medicine to numb the area or reduce swelling. But this has only been proven to work in specific situations.  Only a small number of people will need surgery to treat back pain.  What can I do to keep from getting back pain again? -- The best thing you can do is to stay active. Doing exercises to strengthen and stretch your back can help. You can also:   Learn to lift using your legs instead of your back   Avoid sitting or standing in the same position for too long  Having back pain can be frustrating and scary. But it can help to know that doing these things can lower your risk of having another episode.  All topics are updated as new evidence becomes available and our peer review process is complete.  This topic retrieved from [employer] on: May 10, 2024.  Topic 67778 Version 26.0  Release: 32.4.3 - " C32.129  © 2024 [employer]. and/or its affiliates. All rights reserved.  figure 1: Anatomy of the back     This drawing shows the different parts of the back. Back pain can be caused by problems with the muscles, ligaments, discs, bones (vertebrae), or nerves.  Graphic 60256 Version 6.0  Consumer Information Use and Disclaimer   Disclaimer: This generalized information is a limited summary of diagnosis, treatment, and/or medication information. It is not meant to be comprehensive and should be used as a tool to help the user understand and/or assess potential diagnostic and treatment options. It does NOT include all information about conditions, treatments, medications, side effects, or risks that may apply to a specific patient. It is not intended to be medical advice or a substitute for the medical advice, diagnosis, or treatment of a health care provider based on the health care provider's examination and assessment of a patient's specific and unique circumstances. Patients must speak with a health care provider for complete information about their health, medical questions, and treatment options, including any risks or benefits regarding use of medications. This information does not endorse any treatments or medications as safe, effective, or approved for treating a specific patient. [employer] and its affiliates disclaim any warranty or liability relating to this information or the use thereof.The use of this information is governed by the Terms of Use, available at https://www.woltersNanterouwer.com/en/know/clinical-effectiveness-terms. 2024© [employer] Inc[employer] and its affiliates and/or licensors. All rights reserved.  Copyright   © 2024 [employer], [employer] and/or its affiliates. All rights reserved.

## 2024-11-01 NOTE — PROGRESS NOTES
Ambulatory Visit  Name: Roslyn Manzo      : 1940      MRN: [id number]  Encounter Provider: MARIO ALBERTO Townsend  Encounter Date: 2024   Encounter department: [facility] PRIMARY [facility]ARE    Assessment & Plan  Muscle spasm of back  Patient suffered a mechanical fall 6 days ago. She fell backwards when a couple of dogs came by her feet.  Did not have lightheaded or dizziness prior to fall. When she fell she hit her head and developed a small lump to the back of her head. The lump resolved after a day. She has increased lower back pain since the fall and has been wearing a back brace for support. The pain is more prominent to left lower back related to left hip OA. She rates the pain today as 8/10. Imaging of her sacral area and coccyx bone were negative for acute osseus abnormality. Matt hip xray did not show acute fracture. She has been taking naproxen without relief. Her movement is limited and slow.   She reports that the pain sometimes causes felling of nausea.     Recommended stretching exercises, muscle rub, heating pads alternate with ice to help with inflammation. Stop Naproxen and start as needed Robaxin. Referral for physical therapy ordered.     Will give her a dose of Toradol today to help with acute pain.   Orders:    methocarbamol (ROBAXIN) 500 mg tablet; Take 1 tablet (500 mg total) by mouth 2 (two) times a day as needed for muscle spasms    ketorolac (TORADOL) injection 30 mg    Acute right-sided low back pain, unspecified whether sciatica present  Toradol given in office.  Patient tolerated well.  Referral made to physical therapy    Orders:    Ambulatory Referral to Physical Therapy; Future    ketorolac (TORADOL) injection 30 mg       History of Present Illness     HPI      Review of Systems   Constitutional: Negative.    HENT: Negative.     Eyes: Negative.    Respiratory:  Negative for cough, shortness of breath, wheezing and stridor.    Cardiovascular:  Negative for chest  "pain, palpitations and leg swelling.   Gastrointestinal:  Positive for nausea. Negative for abdominal pain, blood in stool and vomiting.        Gets nauseous  with increased pain   Endocrine: Negative.    Genitourinary:  Positive for urgency.   Musculoskeletal:  Positive for arthralgias, back pain, gait problem and myalgias. Negative for neck pain and neck stiffness.   Skin:  Negative for color change, pallor, rash and wound.   Neurological:  Negative for dizziness, tremors, facial asymmetry, speech difficulty, weakness and light-headedness.   Hematological:  Does not bruise/bleed easily.   Psychiatric/Behavioral:  Negative for agitation, behavioral problems, confusion and dysphoric mood. The patient is not nervous/anxious.            Objective     /68   Pulse 81   Temp 97.5 °F (36.4 °C) (Temporal)   Resp 14   Ht 5' 2\" (1.575 m)   Wt 75.3 kg (166 lb)   SpO2 95%   BMI 30.36 kg/m²     Physical Exam  Constitutional:       General: She is not in acute distress.     Appearance: Normal appearance. She is obese. She is not ill-appearing.   HENT:      Head: Normocephalic and atraumatic.      Nose: Nose normal.      Mouth/Throat:      Mouth: Mucous membranes are moist.   Eyes:      Pupils: Pupils are equal, round, and reactive to light.   Cardiovascular:      Rate and Rhythm: Normal rate and regular rhythm.      Pulses: Normal pulses.      Heart sounds: Normal heart sounds.   Pulmonary:      Effort: Pulmonary effort is normal. No respiratory distress.      Breath sounds: Normal breath sounds.   Chest:      Chest wall: No tenderness.   Abdominal:      General: There is no distension.      Palpations: There is no mass.      Tenderness: There is no abdominal tenderness.   Musculoskeletal:         General: Normal range of motion.      Cervical back: Normal range of motion and neck supple.   Skin:     General: Skin is warm and dry.   Neurological:      General: No focal deficit present.      Mental Status: She is " alert and oriented to person, place, and time.   Psychiatric:         Mood and Affect: Mood normal.         Behavior: Behavior normal.         Thought Content: Thought content normal.         Judgment: Judgment normal.

## 2024-11-06 ENCOUNTER — EVALUATION (OUTPATIENT)
Dept: PHYSICAL THERAPY | Facility: CLINIC | Age: 84
End: 2024-11-06
Payer: MEDICARE

## 2024-11-06 DIAGNOSIS — M25.552 HIP PAIN, BILATERAL: ICD-10-CM

## 2024-11-06 DIAGNOSIS — M54.50 ACUTE RIGHT-SIDED LOW BACK PAIN, UNSPECIFIED WHETHER SCIATICA PRESENT: Primary | ICD-10-CM

## 2024-11-06 DIAGNOSIS — M25.551 HIP PAIN, BILATERAL: ICD-10-CM

## 2024-11-06 PROCEDURE — 97162 PT EVAL MOD COMPLEX 30 MIN: CPT

## 2024-11-06 NOTE — PROGRESS NOTES
PT Evaluation     Today's date: 2024  Patient name: Roslyn Manzo  : 1940  MRN: [id number]  Referring provider: Adina Schwarz CRNP  Dx:   Encounter Diagnosis     ICD-10-CM    1. Acute right-sided low back pain, unspecified whether sciatica present  M54.50 Ambulatory Referral to Physical Therapy      2. Hip pain, bilateral  M25.551     M25.552                      Assessment  Impairments: abnormal gait, abnormal or restricted ROM, activity intolerance, impaired physical strength, lacks appropriate home exercise program, pain with function and activity limitations    Assessment details: Pt presents s/p fall with continued pain lower back and bilateral hip regions.  X-rays negative for fracture.  Pt is to see Ortho MD on 24.  Reports limited ambulatory ability and difficulty with ADL's due to pain.  Reports significant difficulty with supine to sit  movement.  Decreased trunk ROM noted as well as strength deficit BLE. Will benefit from PT tx to decrease symptoms and restore normal functional level.       Prognosis: good    Goals  ST.  Decrease pain 50% 6 wk  2.  Increase trunk ROM to Min limitation  6 wk  3.  Increase trunk strength to F/F+ 6 wk  4.  Increase BLE strength to 4/5 6 wk  LT.  Pt will report no pain 12 wk  2.  Increase trunk ROM to WNL 12 wk  3.  Increase trunk strength to  F+/G 12 wk  4.  Pt will report no limitations with ADL's 12 wk  5.  Pt will report no limitations with ambulation 12 wk      Plan  Patient would benefit from: skilled physical therapy and PT eval  Planned modality interventions: cryotherapy and thermotherapy: hydrocollator packs    Planned therapy interventions: joint mobilization, abdominal trunk stabilization, manual therapy, neuromuscular re-education, self care, strengthening, stretching, flexibility, functional ROM exercises, therapeutic exercise, therapeutic activities and home exercise program    Frequency: 3x week  Duration in weeks: 12  Treatment  plan discussed with: patient        Subjective Evaluation    History of Present Illness  Mechanism of injury: Pt reports falling on 10-25-24.  Reports falling backwards and hit her head.  Reports 2 days later had continued pain lower back/hips.  Went to PCP and had X-rays.  Was given Naproxen.  Advised to use ice/heat.  Now taking Meloxican which provides some relief.  Has continued pain across the lower back.  Reports left hip more symptomatic than right.  X-rays negative for fracture L-spine and Matt Hips.  Arthritic changes noted.  Denies altered sensation BLE.  Pain centered in lower back when sitting.  Pt is to see Ortho MD tomorrow for L-spine/Hips   Patient Goals  Patient goals for therapy: decreased pain  Patient goal: Return to normal social activity  Pain  Current pain ratin  At best pain ratin  At worst pain rating: 10  Location: Lower Back, Hips  Quality: dull ache and sharp  Relieving factors: medications, ice, heat and rest  Aggravating factors: standing and walking (Getting out of bed)    Treatments  Current treatment: medication        Objective     Concurrent Complaints  Negative for bladder dysfunction, bowel dysfunction and saddle (S4) numbness    Postural Observations    Additional Postural Observation Details  Trunk minimally forward flexed with gait    Palpation   Left   Tenderness of the erector spinae, lumbar paraspinals and quadratus lumborum.     Right   Tenderness of the erector spinae, lumbar paraspinals and quadratus lumborum.     Tenderness     Lumbar Spine  Tenderness in the spinous process.     Additional Tenderness Details  TTP lower lumbar spinous process    Active Range of Motion     Lumbar   Flexion:  Restriction level: minimal  Extension:  Restriction level: moderate  Left lateral flexion:  Restriction level: moderate  Right lateral flexion:  Restriction level: moderate  Left rotation:  Restriction level: minimal  Right rotation:  Restriction level: moderate    Additional  "Active Range of Motion Details  Increased symptoms noted with extension, SBR/SBL, and Rot Right    Strength/Myotome Testing     Left Hip   Planes of Motion   Flexion: 3+  Abduction: 3+  Adduction: 3+    Right Hip   Planes of Motion   Flexion: 3+  Abduction: 3+  Adduction: 3+    Left Knee   Flexion: 3+  Extension: 4-    Right Knee   Flexion: 3+  Extension: 4-    Left Ankle/Foot   Dorsiflexion: 4  Plantar flexion: 4    Right Ankle/Foot   Dorsiflexion: 4  Plantar flexion: 4    Tests     Lumbar     Left   Negative passive SLR.     Right   Negative passive SLR.     Ambulation     Ambulation: Stairs     Additional Stairs Ambulation Details  Reports she does not perform steps  First floor set up    Observational Gait   Gait: antalgic   Decreased walking speed and stride length.     Additional Observational Gait Details  Reports increased \"ache\" in lower back with ambulation after approx 25 feet with left side more symptomatic than right    Functional Assessment        Comments  Significant difficulty with moving supine to sit due to pain left lower back region           Precautions: Hx Falls,  Hx L-spine decompression surgery, Bilateral TKR       Manuals                                        Neuro Re-Ed         PPT        PPT w/ march        Physioball abd isometric        LTP/MTP (posture)                                Ther Ex        Nustep for LE strength/ROM        TR/HR        Mini squat        Seated trunk flexion stretch w/ physioball        Supine clam shell        LTR                HEP        Ther Activity                        Gait Training                        Modalities        MHP/CP                    "

## 2024-11-08 ENCOUNTER — OFFICE VISIT (OUTPATIENT)
Dept: PHYSICAL THERAPY | Facility: CLINIC | Age: 84
End: 2024-11-08
Payer: MEDICARE

## 2024-11-08 DIAGNOSIS — M54.50 ACUTE RIGHT-SIDED LOW BACK PAIN, UNSPECIFIED WHETHER SCIATICA PRESENT: Primary | ICD-10-CM

## 2024-11-08 DIAGNOSIS — M25.552 HIP PAIN, BILATERAL: ICD-10-CM

## 2024-11-08 DIAGNOSIS — M25.551 HIP PAIN, BILATERAL: ICD-10-CM

## 2024-11-08 PROCEDURE — 97140 MANUAL THERAPY 1/> REGIONS: CPT

## 2024-11-08 NOTE — PROGRESS NOTES
Daily Note     Today's date: 2024  Patient name: Roslyn Manzo  : 1940  MRN: [id number]  Referring provider: Adina Schwarz CRNP  Dx:   Encounter Diagnosis     ICD-10-CM    1. Acute right-sided low back pain, unspecified whether sciatica present  M54.50       2. Hip pain, bilateral  M25.551     M25.552              Stop Time: 1028       Subjective:  I saw the spine doctor and he said I have sciatica.  He wants me to get an injection      Objective: See treatment diary below      Assessment: Tolerated treatment fair.  Reports she was referred to pain management for injection L-spine.  Continues to report pain primarily left lower back that is severe when attempting to move supine to sit and sit to stand from lower surfaces.  Gentle manual therapy tolerated without increase in pain.  Patient would benefit from continued PT      Plan: Continue per plan of care.         Precautions: Hx Falls,  Hx L-spine decompression surgery, Bilateral TKR       Manuals 24       Gentle stretch/PROM BLE 15                               Neuro Re-Ed         PPT        PPT w/ march        Physioball abd isometric        LTP/MTP (posture)                                Ther Ex        Nustep for LE strength/ROM        TR/HR        Mini squat        Seated trunk flexion stretch w/ physioball        Supine clam shell        LTR                HEP        Ther Activity                        Gait Training                        Modalities        MHP/CP 10'  L-spine

## 2024-11-11 ENCOUNTER — APPOINTMENT (OUTPATIENT)
Dept: PHYSICAL THERAPY | Facility: CLINIC | Age: 84
End: 2024-11-11
Payer: MEDICARE

## 2024-11-13 ENCOUNTER — OFFICE VISIT (OUTPATIENT)
Dept: PHYSICAL THERAPY | Facility: CLINIC | Age: 84
End: 2024-11-13
Payer: MEDICARE

## 2024-11-13 DIAGNOSIS — M54.50 ACUTE RIGHT-SIDED LOW BACK PAIN, UNSPECIFIED WHETHER SCIATICA PRESENT: Primary | ICD-10-CM

## 2024-11-13 DIAGNOSIS — M25.552 HIP PAIN, BILATERAL: ICD-10-CM

## 2024-11-13 DIAGNOSIS — M25.551 HIP PAIN, BILATERAL: ICD-10-CM

## 2024-11-13 PROCEDURE — 97140 MANUAL THERAPY 1/> REGIONS: CPT

## 2024-11-13 PROCEDURE — 97110 THERAPEUTIC EXERCISES: CPT

## 2024-11-14 ENCOUNTER — OFFICE VISIT (OUTPATIENT)
Age: 84
End: 2024-11-14
Payer: MEDICARE

## 2024-11-14 VITALS
HEIGHT: 62 IN | TEMPERATURE: 98 F | WEIGHT: 162 LBS | HEART RATE: 86 BPM | OXYGEN SATURATION: 96 % | DIASTOLIC BLOOD PRESSURE: 84 MMHG | SYSTOLIC BLOOD PRESSURE: 110 MMHG | BODY MASS INDEX: 29.81 KG/M2

## 2024-11-14 DIAGNOSIS — E66.3 OVERWEIGHT (BMI 25.0-29.9): ICD-10-CM

## 2024-11-14 DIAGNOSIS — J41.0 SIMPLE CHRONIC BRONCHITIS (HCC): ICD-10-CM

## 2024-11-14 DIAGNOSIS — G47.33 OSA ON CPAP: Primary | ICD-10-CM

## 2024-11-14 PROCEDURE — 99213 OFFICE O/P EST LOW 20 MIN: CPT | Performed by: INTERNAL MEDICINE

## 2024-11-14 NOTE — PROGRESS NOTES
"Assessment/Plan:   Diagnoses and all orders for this visit:    CHUYITA on CPAP    Simple chronic bronchitis (HCC)    Overweight (BMI 25.0-29.9)        PFTs in 2019 with no evidence of any large airway obstruction but she has been on bronchodilators for a long time and she states it has been helping her  Continue with Symbicort 160/4.52 puffs twice daily  Rinse mouth after use continue with montelukast 10 mg daily at bedtime  Continue with albuterol MDI 2 puffs 4 times daily as needed  If give another spacer to use along with the MDI.  She does have a nebulizer machine to use with albuterol if she really needs it.  She will continue with the acid reflux medications for her for the GERD.  CHUYITA on CPAP consistently using her CPAP  Discussed with the patient to continue with her current settings  Cleaning of the supplies and change of PAP supplies discussed  Caution against driving when sleepy.  Recommend weight loss  Follow-up in 6 months or earlier as needed  Return in about 6 months (around 5/14/2025).  All questions are answered to the patient's satisfaction and understanding.  She verbalizes understanding.  She is encouraged to call with any further questions or concerns.    Portions of the record may have been created with voice recognition software.  Occasional wrong word or \"sound a like\" substitutions may have occurred due to the inherent limitations of voice recognition software.  Read the chart carefully and recognize, using context, where substitutions have occurred.    Electronically Signed by Apryl Silvestre MD    ______________________________________________________________________    Chief Complaint:   Chief Complaint   Patient presents with    Follow-up    Sleep Apnea       Patient ID: Roslyn is a 84 y.o. y.o. female has a past medical history of Anxiety, Arthritis, Asthma, COPD (chronic obstructive pulmonary disease) ([facility]), COVID (06/2022), CPAP (continuous positive airway pressure) dependence, Depression, " Disease of thyroid gland, GERD (gastroesophageal reflux disease), History of transfusion, Hyperlipidemia, Hypotension, MRSA (methicillin resistant Staphylococcus aureus) infection, Shortness of breath, Sleep apnea, Sleep apnea, obstructive, Use of cane as ambulatory aid, Wears glasses, and Wrist fracture.    11/14/2024  Patient presents today for follow-up visit.  Patient is an 84-year-old female former smoker who quit over 20 years ago with past medical history of asthma, COPD, seasonal environmental allergies, hiatal hernia, GERD, hypertension, obesity, CHUYITA.  She recently had an episode of test for which she needed 2 rounds of prednisone and an antibiotic currently today she states she feels significantly better not having the need to use her rescue inhaler not having any nocturnal awakenings with cough and wheezing currently.        Shortness of Breath  Associated symptoms include coughing. Pertinent negatives include no wheezing.   Cough  Associated symptoms include shortness of breath. Pertinent negatives include no wheezing.   Wheezing  Associated symptoms include coughing. Pertinent negatives include no wheezing.       Review of Systems   Constitutional: Negative.    HENT: Negative.     Eyes: Negative.    Respiratory:  Positive for cough and shortness of breath. Negative for wheezing.    Cardiovascular: Negative.    Gastrointestinal: Negative.    Endocrine: Negative.    Genitourinary: Negative.    Musculoskeletal: Negative.    Allergic/Immunologic: Negative.    Neurological: Negative.    Psychiatric/Behavioral: Negative.         Smoking history: She reports that she quit smoking about 24 years ago. Her smoking use included cigarettes. She started smoking about 54 years ago. She has a 7.5 pack-year smoking history. She has never been exposed to tobacco smoke. She has never used smokeless tobacco.    The following portions of the patient's history were reviewed and updated as appropriate: allergies, current  medications, past family history, past medical history, past social history, past surgical history, and problem list.    Immunization History   Administered Date(s) Administered    COVID-19 MODERNA VACC 0.5 ML IM 02/08/2021, 03/09/2021, 10/24/2021, 04/08/2022    COVID-19 Pfizer Vac BIVALENT Edward-sucrose 12 Yr+ IM 12/13/2022    COVID-19 Pfizer mRNA vacc PF edward-sucrose 12 yr and older (Comirnaty) 10/01/2023, 09/23/2024    INFLUENZA 10/24/2016, 08/16/2018, 10/18/2022, 10/01/2023    Influenza Split High Dose Preservative Free IM 09/12/2012, 09/13/2017, 08/16/2018, 08/27/2020, 09/23/2024    Influenza, Seasonal Vaccine, Quadrivalent, Adjuvanted, .5e 10/18/2022    Influenza, high dose seasonal 0.7 mL 08/27/2020    Influenza, seasonal, injectable 1940    Pneumococcal Conjugate 13-Valent 03/23/2016    Pneumococcal Polysaccharide PPV23 1940, 01/01/2014    Respiratory Syncytial Virus Vaccine (Recombinant, Adjuvanted) 01/29/2024    Tdap [birth year], 09/26/2016    Zoster [birth year]    Zoster Vaccine Recombinant 08/16/2018, 01/25/2019    influenza, trivalent, adjuvanted 09/13/2019     Current Outpatient Medications   Medication Sig Dispense Refill    albuterol (ACCUNEB) 0.63 MG/3ML nebulizer solution Take 3 mL (0.63 mg total) by nebulization every 6 (six) hours as needed for wheezing or shortness of breath 1080 mL 3    albuterol (PROVENTIL HFA,VENTOLIN HFA) 90 mcg/act inhaler Inhale 1 puff every 6 (six) hours as needed for wheezing 18 g 3    famotidine (PEPCID) 40 MG tablet Take 1 tablet (40 mg total) by mouth in the morning 180 tablet 1    FLUoxetine (PROzac) 40 MG capsule Take 1 capsule (40 mg total) by mouth 2 (two) times a day 60 capsule 0    hydroCHLOROthiazide 25 mg tablet TAKE 1 TABLET BY MOUTH IN THE  MORNING 90 tablet 3    ipratropium-albuterol (DUO-NEB) 0.5-2.5 mg/3 mL nebulizer solution Take 3 mL by nebulization 4 (four) times a day 360 mL 0    levothyroxine 50 mcg tablet Take 50 mcg  "by mouth daily      methocarbamol (ROBAXIN) 500 mg tablet Take 1 tablet (500 mg total) by mouth 2 (two) times a day as needed for muscle spasms 30 tablet 0    montelukast (SINGULAIR) 10 mg tablet TAKE 1 TABLET BY MOUTH DAILY AT  BEDTIME 90 tablet 1    naproxen (Naprosyn) 500 mg tablet Take 1 tablet (500 mg total) by mouth 2 (two) times a day with meals 30 tablet 0    pantoprazole (PROTONIX) 40 mg tablet Take 1 tablet (40 mg total) by mouth daily 30 tablet 2    promethazine-codeine (PHENERGAN WITH CODEINE) 6.25-10 mg/5 mL syrup Take 5 mL by mouth every 4 (four) hours as needed for cough 473 mL 0    simvastatin (ZOCOR) 10 mg tablet TAKE 1 TABLET BY MOUTH DAILY AT  BEDTIME 90 tablet 1    Symbicort 160-4.5 MCG/ACT inhaler USE 2 INHALATIONS BY MOUTH TWICE DAILY . RINSE MOUTH AFTER USE . 30.6 g 1    benzonatate (TESSALON) 200 MG capsule Take 1 capsule (200 mg total) by mouth 3 (three) times a day as needed for cough (Patient not taking: Reported on 11/14/2024) 20 capsule 0    methylPREDNISolone 4 MG tablet therapy pack Use as directed on package (Patient not taking: Reported on 11/14/2024) 21 each 0     No current facility-administered medications for this visit.     Allergies: Ascorbate - food allergy    Objective:  Vitals:    11/14/24 0937   BP: 110/84   Pulse: 86   Temp: 98 °F (36.7 °C)   SpO2: 96%   Weight: 73.5 kg (162 lb)   Height: 5' 2\" (1.575 m)   Oxygen Therapy  SpO2: 96 %  .  Wt Readings from Last 3 Encounters:   11/14/24 73.5 kg (162 lb)   11/01/24 75.3 kg (166 lb)   10/28/24 75.3 kg (166 lb)     Body mass index is 29.63 kg/m².    Physical Exam  Constitutional:       General: She is not in acute distress.     Appearance: Normal appearance. She is well-developed. She is not ill-appearing.   HENT:      Head: Normocephalic and atraumatic.      Mouth/Throat:      Pharynx: Oropharynx is clear.   Eyes:      Pupils: Pupils are equal, round, and reactive to light.   Cardiovascular:      Rate and Rhythm: Normal rate and " regular rhythm.   Pulmonary:      Effort: Pulmonary effort is normal. No respiratory distress.      Breath sounds: No decreased breath sounds, rhonchi or rales.   Abdominal:      General: Abdomen is flat. There is no distension.   Musculoskeletal:         General: Normal range of motion.      Cervical back: Normal range of motion.      Right lower leg: No edema.      Left lower leg: No edema.   Skin:     General: Skin is warm and dry.      Findings: No rash.   Neurological:      Mental Status: She is alert and oriented to person, place, and time.   Psychiatric:         Mood and Affect: Mood normal.         Behavior: Behavior normal.         Lab Review:   Office Visit on 09/24/2024   Component Date Value    POCT SARS-CoV-2 Ag 09/24/2024 Negative     VALID CONTROL 09/24/2024 Valid     SARS-CoV-2 09/24/2024 Negative     INFLUENZA A PCR 09/24/2024 Negative     INFLUENZA B PCR 09/24/2024 Negative    Hospital Outpatient Visit on 08/22/2024   Component Date Value    Case Report 08/22/2024                      Value:Surgical Pathology Report                         Case: [id number]                                  Authorizing Provider:  Yves Yen III, MD   Collected:           08/22/2024 0832              Ordering Location:      [facility]       Received:            08/22/2024 [address] [place] [address] Endoscopy                                                             Pathologist:           Axel Dalton DO                                                            Specimens:   A) - Duodenum                                                                                       B) - Stomach                                                                               Final Diagnosis 08/22/2024                      Value:A. Duodenum, biopsy:  -Duodenal mucosa with no significant pathologic change.  -Negative for increased intraepithelial lymphocytes.      B. Stomach, biopsy:  -Gastric  "antral and oxyntic mucosa with no significant histopathologic change.   -Negative for Helicobacter pylori organisms on H&E stain.       Additional Information 08/22/2024                      Value:All reported additional testing was performed with appropriately reactive controls.  These tests were developed and their performance characteristics determined by [facility] or appropriate performing facility, though some tests may be performed on tissues which have not been validated for performance characteristics (such as staining performed on alcohol exposed cell blocks and decalcified tissues).  Results should be interpreted with caution and in the context of the patients’ clinical condition. These tests may not be cleared or approved by the U.S. Food and Drug Administration, though the FDA has determined that such clearance or approval is not necessary. These tests are used for clinical purposes and they should not be regarded as investigational or for research. This laboratory has been approved by CLIA 88, designated as a high-complexity laboratory and is qualified to perform these tests.    Interpretation performed at [facility], [facility] [address]      Gross Description 08/22/2024                      Value:A. The specimen is received in formalin, labeled with the patient's name and hospital number, and is designated \" duodenum biopsy\".  The specimen consists of 2 tan tissue fragments measuring 0.2-0.4 cm in greatest dimension.  Entirely submitted.  One screened cassette.    B. The specimen is received in formalin, labeled with the patient's name and hospital number, and is designated \" stomach biopsy\".  The specimen consists of 2 tan tissue fragments measuring 0.4 and 0.6 cm in greatest dimension.  The specimen is entirely submitted in a screened cassette.    Note: The estimated total formalin fixation time based upon information provided by " the submitting clinician and the standard processing schedule is under 72 hours. Mary Kate Maldonado      Clinical Information 08/22/2024                      Value:Cold bx r/o celiac       Diagnostics:  Results Review Statement: No pertinent imaging studies reviewed.    ESS: Total score: 11  XR hips bilateral 2 vw w pelvis if performed  Result Date: 10/28/2024  Narrative: BILATERAL HIPS AND PELVIS INDICATION:   Unspecified fall, initial encounter. COMPARISON: None. VIEWS:  XR HIPS BILATERAL 2 VW W PELVIS IF PERFORMED Images: 5  FINDINGS: The bony pelvis appears intact. Degenerative changes visualized lower lumbar spine. Postsurgical changes posterior elements L5-S1. LEFT HIP: There is no acute fracture or dislocation. Moderate left hip osteoarthritis is seen. No lytic or blastic osseous lesion. There are atherosclerotic calcifications. Soft tissues are otherwise unremarkable. RIGHT HIP: There is no acute fracture or dislocation. Moderate right hip osteoarthritis is seen. No lytic or blastic osseous lesion. There are atherosclerotic calcifications. Soft tissues are otherwise unremarkable.     Impression: No acute osseous abnormality. Degenerative changes as described. Electronically signed: 10/28/2024 12:03 PM Delbert Vergara MPH,MD    XR sacrum and coccyx  Result Date: 10/28/2024  Narrative: SACRUM AND COCCYX INDICATION:   Unspecified fall, initial encounter. COMPARISON:  None. VIEWS:  XR SACRUM AND COCCYX Images: 3  FINDINGS: There is no evidence of an acute fracture. Sacral arcuate lines are maintained. The sacroiliac joints appear symmetric. Mild osteitis pubis without widening. Degenerative changes visualized lower lumbar spine. Postsurgical change at the spinous processes lower lumbar spine.     Impression: No acute osseous abnormality. Electronically signed: 10/28/2024 11:58 AM Delbert Vergara MPH,MD

## 2024-11-15 ENCOUNTER — OFFICE VISIT (OUTPATIENT)
Dept: PHYSICAL THERAPY | Facility: CLINIC | Age: 84
End: 2024-11-15
Payer: MEDICARE

## 2024-11-15 DIAGNOSIS — M25.551 HIP PAIN, BILATERAL: ICD-10-CM

## 2024-11-15 DIAGNOSIS — M54.50 ACUTE RIGHT-SIDED LOW BACK PAIN, UNSPECIFIED WHETHER SCIATICA PRESENT: Primary | ICD-10-CM

## 2024-11-15 DIAGNOSIS — M25.552 HIP PAIN, BILATERAL: ICD-10-CM

## 2024-11-15 PROCEDURE — 97110 THERAPEUTIC EXERCISES: CPT

## 2024-11-15 PROCEDURE — 97112 NEUROMUSCULAR REEDUCATION: CPT

## 2024-11-15 NOTE — PROGRESS NOTES
"Daily Note     Today's date: 11/15/2024  Patient name: Roslyn Manzo  : 1940  MRN: [id number]  Referring provider: Adina Schwarz CRNP  Dx:   Encounter Diagnosis     ICD-10-CM    1. Acute right-sided low back pain, unspecified whether sciatica present  M54.50       2. Hip pain, bilateral  M25.551     M25.552                      Subjective:   I'm better since the injection.  The back still gets sore with I do things      Objective: See treatment diary below      Assessment: Tolerated treatment well.   Avoided supine as pt reports this aggravates back.  Minimal symptoms noted right lower back region with TE.  Reports improved ability to get in/out of bed since injection.  Patient would benefit from continued PT      Plan: Continue per plan of care.      Precautions: Hx Falls,  Hx L-spine decompression surgery, Bilateral TKR       Manuals 11-8-24 11-13-24 11-15-24     Gentle stretch/PROM BLE 15' 15'                              Neuro Re-Ed         PPT  5\" x10      PPT w/ march        Physioball abd isometric   Seated 3x10     LTP/MTP (posture)        St Hip 3 way   10x ea BLE     St trunk ext   At table 2x10               Ther Ex        Nustep for LE strength/ROM  Lv3 10' L3  10'     TR/HR        Mini squat        Seated trunk flexion stretch w/ physioball  10\"x10 fwd, and to L  Flex, R/L  10x  10\" ea     Seated lateral trunk stretch   10x  5\"  ea     Supine clam shell        LTR  5\"x20              HEP  Log roll 5'       Ther Activity                        Gait Training                        Modalities        MHP/CP 10'  L-spine Declined  10'  L-spine                       "

## 2024-11-18 ENCOUNTER — OFFICE VISIT (OUTPATIENT)
Dept: PHYSICAL THERAPY | Facility: CLINIC | Age: 84
End: 2024-11-18
Payer: MEDICARE

## 2024-11-18 DIAGNOSIS — M25.551 HIP PAIN, BILATERAL: ICD-10-CM

## 2024-11-18 DIAGNOSIS — M25.552 HIP PAIN, BILATERAL: ICD-10-CM

## 2024-11-18 DIAGNOSIS — M54.50 ACUTE RIGHT-SIDED LOW BACK PAIN, UNSPECIFIED WHETHER SCIATICA PRESENT: Primary | ICD-10-CM

## 2024-11-18 PROCEDURE — 97110 THERAPEUTIC EXERCISES: CPT

## 2024-11-18 NOTE — PROGRESS NOTES
"Daily Note     Today's date: 2024  Patient name: Roslyn Manzo  : 1940  MRN: [id number]  Referring provider: Adina Schwarz CRNP  Dx:   Encounter Diagnosis     ICD-10-CM    1. Acute right-sided low back pain, unspecified whether sciatica present  M54.50       2. Hip pain, bilateral  M25.551     M25.552                      Subjective:   The sharp pain is gone but the back is still sore      Objective: See treatment diary below      Assessment: Tolerated treatment well.  New TE added for low back and LE flexibility.  Updated HEP.  Patient would benefit from continued PT      Plan: Continue per plan of care.      Precautions: Hx Falls,  Hx L-spine decompression surgery, Bilateral TKR       Manuals 11-8-24 11-13-24 11-15-24 11-18-24    Gentle stretch/PROM BLE 15' 15'                              Neuro Re-Ed         PPT  5\" x10  Pain     PPT w/ march        Physioball abd isometric   Seated 3x10     LTP/MTP (posture)        St Hip 3 way   10x ea BLE     St trunk ext   At table 2x10               Ther Ex        Nustep for LE strength/ROM  Lv3 10' L3  10' L3  12'    TR/HR        Mini squat        Seated trunk flexion stretch w/ physioball  10\"x10 fwd, and to L  Flex, R/L  10x  10\" ea Flex, R/L 10x 10-15\"  ea    Seated lateral trunk stretch   10x  5\"  ea     Supine clam shell        SKTC    10x  10\"  lawson    Self HS stretch    Supine w/ strap 10x  15\"  lawson    Self Piri stretch    Supine 10x 15\"  lawson    LTR  5\"x20  2x10  5\"            HEP  Log roll 5'   Updated HEP  ([id number])    Ther Activity                        Gait Training                        Modalities        MHP/CP 10'  L-spine Declined  10'  L-spine  5'  L-spine                       "

## 2024-11-19 ENCOUNTER — APPOINTMENT (OUTPATIENT)
Dept: PHYSICAL THERAPY | Facility: CLINIC | Age: 84
End: 2024-11-19
Payer: MEDICARE

## 2024-11-20 ENCOUNTER — OFFICE VISIT (OUTPATIENT)
Dept: PHYSICAL THERAPY | Facility: CLINIC | Age: 84
End: 2024-11-20
Payer: MEDICARE

## 2024-11-20 DIAGNOSIS — M25.552 HIP PAIN, BILATERAL: ICD-10-CM

## 2024-11-20 DIAGNOSIS — M54.50 ACUTE RIGHT-SIDED LOW BACK PAIN, UNSPECIFIED WHETHER SCIATICA PRESENT: Primary | ICD-10-CM

## 2024-11-20 DIAGNOSIS — M25.551 HIP PAIN, BILATERAL: ICD-10-CM

## 2024-11-20 PROCEDURE — 97112 NEUROMUSCULAR REEDUCATION: CPT

## 2024-11-20 PROCEDURE — 97110 THERAPEUTIC EXERCISES: CPT

## 2024-11-20 NOTE — PROGRESS NOTES
"Daily Note     Today's date: 2024  Patient name: Roslyn Manzo  : 1940  MRN: [id number]  Referring provider: Adina Schwarz CRNP  Dx:   Encounter Diagnosis     ICD-10-CM    1. Acute right-sided low back pain, unspecified whether sciatica present  M54.50       2. Hip pain, bilateral  M25.551     M25.552                      Subjective:   I'm feeling better.  It hurts mostly trying to get out of bed      Objective: See treatment diary below      Assessment: Tolerated treatment well.   Difficulty with bridging initially however able to perform full motion after 5 reps.  Decreased pain with moving supine to sit noted afterwards.  Patient would benefit from continued PT      Plan: Continue per plan of care.      Precautions: Hx Falls,  Hx L-spine decompression surgery, Bilateral TKR       Manuals 11-8-24 11-13-24 11-15-24 11-18-24 11-20-24   Gentle stretch/PROM BLE 15' 15'                              Neuro Re-Ed         PPT  5\" x10  Pain     PPT w/ march        Bridge     2x10  5\"   Add sq     2x10  5\"   Physioball abd isometric   Seated 3x10     LTP/MTP (posture)        St Hip 3 way   10x ea BLE     St trunk ext   At table 2x10    2x10 at table           Ther Ex        Nustep for LE strength/ROM  Lv3 10' L3  10' L3  12' L3  15'   TR/HR        Mini squat        Seated trunk flexion stretch w/ physioball  10\"x10 fwd, and to L  Flex, R/L  10x  10\" ea Flex, R/L 10x 10-15\"  ea Flex, R/L 10x 10-15\"  ea   Seated lateral trunk stretch   10x  5\"  ea     Supine clam shell     Green 20x  5\"   SKTC    10x  10\"  lawson    Self HS stretch    Supine w/ strap 10x  15\"  lawson Supine w/ strap 10x 15\" BLE   Self Piri stretch    Supine 10x 15\"  lawson    LTR  5\"x20  2x10  5\" 2x10  5\"           HEP  Log roll 5'   Updated HEP  ([id number])    Ther Activity                        Gait Training                        Modalities        MHP/CP 10'  L-spine Declined  10'  L-spine  5'  L-spine                         "

## 2024-11-21 ENCOUNTER — APPOINTMENT (OUTPATIENT)
Dept: PHYSICAL THERAPY | Facility: CLINIC | Age: 84
End: 2024-11-21
Payer: MEDICARE

## 2024-11-22 ENCOUNTER — APPOINTMENT (OUTPATIENT)
Dept: PHYSICAL THERAPY | Facility: CLINIC | Age: 84
End: 2024-11-22
Payer: MEDICARE

## 2024-11-23 DIAGNOSIS — R13.10 DYSPHAGIA, UNSPECIFIED TYPE: ICD-10-CM

## 2024-11-23 DIAGNOSIS — K21.9 GASTROESOPHAGEAL REFLUX DISEASE WITHOUT ESOPHAGITIS: ICD-10-CM

## 2024-11-25 ENCOUNTER — OFFICE VISIT (OUTPATIENT)
Dept: PHYSICAL THERAPY | Facility: CLINIC | Age: 84
End: 2024-11-25
Payer: MEDICARE

## 2024-11-25 DIAGNOSIS — M25.551 HIP PAIN, BILATERAL: ICD-10-CM

## 2024-11-25 DIAGNOSIS — M25.552 HIP PAIN, BILATERAL: ICD-10-CM

## 2024-11-25 DIAGNOSIS — M54.50 ACUTE RIGHT-SIDED LOW BACK PAIN, UNSPECIFIED WHETHER SCIATICA PRESENT: Primary | ICD-10-CM

## 2024-11-25 PROCEDURE — 97110 THERAPEUTIC EXERCISES: CPT

## 2024-11-25 PROCEDURE — 97112 NEUROMUSCULAR REEDUCATION: CPT

## 2024-11-25 RX ORDER — PANTOPRAZOLE SODIUM 40 MG/1
40 TABLET, DELAYED RELEASE ORAL DAILY
Qty: 30 TABLET | Refills: 5 | Status: SHIPPED | OUTPATIENT
Start: 2024-11-25

## 2024-11-25 NOTE — PROGRESS NOTES
"Daily Note     Today's date: 2024  Patient name: Roslyn Manzo  : 1940  MRN: [id number]  Referring provider: Adina Schwarz CRNP  Dx:   Encounter Diagnosis     ICD-10-CM    1. Acute right-sided low back pain, unspecified whether sciatica present  M54.50       2. Hip pain, bilateral  M25.551     M25.552                      Subjective:   I'm feeling better      Objective: See treatment diary below      Assessment: Tolerated treatment well.  Reports no sharp pain lower back and getting in/out of bed much easier.  Doing well with HEP.  Patient would benefit from continued PT      Plan: Continue per plan of care.      Precautions: Hx Falls,  Hx L-spine decompression surgery, Bilateral TKR       Manuals 11-25-24 11-13-24 11-15-24 11-18-24 11-20-24   Gentle stretch/PROM BLE  15'                              Neuro Re-Ed         PPT  5\" x10  Pain     PPT w/ march        Bridge 2x10  5\"    2x10  5\"   Add sq     2x10  5\"   Physioball abd isometric   Seated 3x10     LTP/MTP (posture)        St Hip 3 way 2x10 ea BLE  10x ea BLE     St trunk ext 2x10  at table  At table 2x10    2x10 at table           Ther Ex        Nustep for LE strength/ROM L3  15' Lv3 10' L3  10' L3  12' L3  15'   TR/HR        Mini squat        Seated trunk flexion stretch w/ physioball Flex, R/L 10x ea  10-15\" 10\"x10 fwd, and to L  Flex, R/L  10x  10\" ea Flex, R/L 10x 10-15\"  ea Flex, R/L 10x 10-15\"  ea   Seated lateral trunk stretch   10x  5\"  ea     Supine clam shell     Green 20x  5\"   SKTC    10x  10\"  lawson    Self HS stretch    Supine w/ strap 10x  15\"  lawson Supine w/ strap 10x 15\" BLE   Self Piri stretch 10x 10\"  BLE   Supine 10x 15\"  lawson    LTR  5\"x20  2x10  5\" 2x10  5\"           HEP Reviewed  Log roll 5'   Updated HEP  ([id number])    Ther Activity                        Gait Training                        Modalities        MHP/CP 5'  L-spine Declined  10'  L-spine  5'  L-spine                           "

## 2024-11-27 ENCOUNTER — OFFICE VISIT (OUTPATIENT)
Dept: PHYSICAL THERAPY | Facility: CLINIC | Age: 84
End: 2024-11-27
Payer: MEDICARE

## 2024-11-27 DIAGNOSIS — M54.50 ACUTE RIGHT-SIDED LOW BACK PAIN, UNSPECIFIED WHETHER SCIATICA PRESENT: Primary | ICD-10-CM

## 2024-11-27 DIAGNOSIS — M25.552 HIP PAIN, BILATERAL: ICD-10-CM

## 2024-11-27 DIAGNOSIS — M25.551 HIP PAIN, BILATERAL: ICD-10-CM

## 2024-11-27 PROCEDURE — 97112 NEUROMUSCULAR REEDUCATION: CPT

## 2024-11-27 PROCEDURE — 97110 THERAPEUTIC EXERCISES: CPT

## 2024-11-27 NOTE — PROGRESS NOTES
"Daily Note     Today's date: 2024  Patient name: Roslyn Manzo  : 1940  MRN: [id number]  Referring provider: Adina Schwarz CRNP  Dx:   Encounter Diagnosis     ICD-10-CM    1. Acute right-sided low back pain, unspecified whether sciatica present  M54.50       2. Hip pain, bilateral  M25.551     M25.552                      Subjective:   The back is feeling better. It gets sore if I stand for a long time      Objective: See treatment diary below      Assessment: Tolerated treatment well.  Improving mobility noted.  Decreased overall pain.  Transfers in/out of bed easier and less pain.   Patient would benefit from continued PT      Plan: Continue per plan of care.      Precautions: Hx Falls,  Hx L-spine decompression surgery, Bilateral TKR       Manuals 11-25-24 11-27-24 11-15-24 11-18-24 11-20-24   Gentle stretch/PROM BLE                                Neuro Re-Ed         PPT    Pain     PPT w/ march        Bridge 2x10  5\" 2x10  5\"   2x10  5\"   Add sq  2x10  5\"   2x10  5\"   Physioball abd isometric   Seated 3x10     LTP/MTP (posture)        St Hip 3 way 2x10 ea BLE 2x10 ea  BLE 10x ea BLE     St trunk ext 2x10  at table 2x10  at table  At table 2x10    2x10 at table           Ther Ex        Nustep for LE strength/ROM L3  15' L4 15' L3  10' L3  12' L3  15'   TR/HR        Mini squat  2x10      Seated trunk flexion stretch w/ physioball Flex, R/L 10x ea  10-15\" 10\"x15  Fwd,  R/L  Flex, R/L  10x  10\" ea Flex, R/L 10x 10-15\"  ea Flex, R/L 10x 10-15\"  ea   Seated lateral trunk stretch   10x  5\"  ea     Supine clam shell     Green 20x  5\"   SKTC    10x  10\"  lawson    Self HS stretch    Supine w/ strap 10x  15\"  lawson Supine w/ strap 10x 15\" BLE   Self Piri stretch 10x 10\"  BLE 10x  10\"  BLE  Supine 10x 15\"  lawson    LTR  5\"x20  2x10  5\" 2x10  5\"           HEP Reviewed    Updated HEP  ([id number])    Ther Activity                        Gait Training                        Modalities        MHP/CP 5'  L-spine Declined "  10'  L-spine  5'  L-spine

## 2024-12-02 ENCOUNTER — OFFICE VISIT (OUTPATIENT)
Dept: PHYSICAL THERAPY | Facility: CLINIC | Age: 84
End: 2024-12-02
Payer: MEDICARE

## 2024-12-02 DIAGNOSIS — M25.552 HIP PAIN, BILATERAL: ICD-10-CM

## 2024-12-02 DIAGNOSIS — M54.50 ACUTE RIGHT-SIDED LOW BACK PAIN, UNSPECIFIED WHETHER SCIATICA PRESENT: Primary | ICD-10-CM

## 2024-12-02 DIAGNOSIS — M25.551 HIP PAIN, BILATERAL: ICD-10-CM

## 2024-12-02 PROCEDURE — 97110 THERAPEUTIC EXERCISES: CPT

## 2024-12-02 PROCEDURE — 97112 NEUROMUSCULAR REEDUCATION: CPT

## 2024-12-02 NOTE — PROGRESS NOTES
PT RE-EVALUATION     Today's date: 2024  Patient name: Roslyn Manzo  : 1940  MRN: [id number]  Referring provider: Adina Schwarz CRNP  Dx:   Encounter Diagnosis     ICD-10-CM    1. Acute right-sided low back pain, unspecified whether sciatica present  M54.50       2. Hip pain, bilateral  M25.551     M25.552                      Assessment  Impairments: abnormal gait, abnormal or restricted ROM, activity intolerance, impaired physical strength, lacks appropriate home exercise program, pain with function and activity limitations    Assessment details: Pt presents s/p fall with continued pain lower back and bilateral hip regions.  X-rays negative for fracture.  Pt is to see Ortho MD on 24.  Reports limited ambulatory ability and difficulty with ADL's due to pain.  Reports significant difficulty with supine to sit  movement.  Decreased trunk ROM noted as well as strength deficit BLE. Will benefit from PT tx to decrease symptoms and restore normal functional level.      24:  Pt progressing well with PT tx and prior injection to L-spine region.  Decreased overall pain noted.  Improved ability to get in/out of bed without pain noted.  Improved ability to perform ADL's.  Gait WFL.  Reports ache across lower back with prolonged standing or sitting.  This is resolved with positional changes and use of heat.  Will benefit from continued PT tx to decrease symptoms and improve functional level.       Prognosis: good    Goals  ST.  Decrease pain 50% 6 wk  2.  Increase trunk ROM to Min limitation  6 wk  3.  Increase trunk strength to F/F+ 6 wk  4.  Increase BLE strength to 4/5 6 wk  (met)  LT.  Pt will report no pain 12 wk  2.  Increase trunk ROM to WNL 12 wk  3.  Increase trunk strength to  F+/G 12 wk  4.  Pt will report no limitations with ADL's 12 wk  5.  Pt will report no limitations with ambulation 12 wk  (partially met)      Plan  Patient would benefit from: skilled physical therapy and PT  eval  Planned modality interventions: cryotherapy and thermotherapy: hydrocollator packs    Planned therapy interventions: joint mobilization, abdominal trunk stabilization, manual therapy, neuromuscular re-education, self care, strengthening, stretching, flexibility, functional ROM exercises, therapeutic exercise, therapeutic activities and home exercise program    Frequency: 3x week  Duration in weeks: 12  Treatment plan discussed with: patient        Subjective Evaluation    History of Present Illness  Mechanism of injury: Pt reports falling on 10-25-24.  Reports falling backwards and hit her head.  Reports 2 days later had continued pain lower back/hips.  Went to PCP and had X-rays.  Was given Naproxen.  Advised to use ice/heat.  Now taking Meloxican which provides some relief.  Has continued pain across the lower back.  Reports left hip more symptomatic than right.  X-rays negative for fracture L-spine and Matt Hips.  Arthritic changes noted.  Denies altered sensation BLE.  Pain centered in lower back when sitting.  Pt is to see Ortho MD tomorrow for L-spine/Hips   Patient Goals  Patient goals for therapy: decreased pain  Patient goal: Return to normal social activity  Pain  Current pain ratin  At best pain ratin  At worst pain ratin  Location: Lower Back  Quality: dull ache  Relieving factors: heat and rest  Aggravating factors: standing and sitting (Prolonged standing/sitting)  Progression: improved    Treatments  Current treatment: medication        Objective     Concurrent Complaints  Negative for bladder dysfunction, bowel dysfunction and saddle (S4) numbness    Palpation   Left   Tenderness of the erector spinae, lumbar paraspinals and quadratus lumborum.     Right   Tenderness of the erector spinae, lumbar paraspinals and quadratus lumborum.     Additional Palpation Details  Continued tenderness noted in lower lumbar musculature    Active Range of Motion     Lumbar   Flexion:  Restriction  "level: minimal  Extension:  Restriction level: minimal  Left lateral flexion:  Restriction level: minimal  Right lateral flexion:  Restriction level: minimal  Left rotation:  Restriction level: minimal  Right rotation:  Restriction level: minimal    Additional Active Range of Motion Details  Tightness with end range movements     Strength/Myotome Testing     Left Hip   Planes of Motion   Flexion: 4  Abduction: 4  Adduction: 4    Right Hip   Planes of Motion   Flexion: 4  Abduction: 4  Adduction: 4    Left Knee   Flexion: 4  Extension: 4    Right Knee   Flexion: 4  Extension: 4    Left Ankle/Foot   Dorsiflexion: 4  Plantar flexion: 4    Right Ankle/Foot   Dorsiflexion: 4  Plantar flexion: 4    Tests     Lumbar     Left   Negative passive SLR.     Right   Negative passive SLR.     Ambulation     Ambulation: Stairs     Additional Stairs Ambulation Details  Reports she does not perform steps  First floor set up    Observational Gait   Gait: within functional limits     Functional Assessment        Comments  Significant difficulty with moving supine to sit due to pain left lower back region      Precautions: Hx Falls,  Hx L-spine decompression surgery, Bilateral TKR       Manuals 11-25-24 11-27-24 12-2-24 11-18-24 11-20-24   Gentle stretch/PROM BLE                                Neuro Re-Ed         PPT    Pain     PPT w/ march        Bridge 2x10  5\" 2x10  5\" 2x10  5\"  2x10  5\"   Add sq  2x10  5\" 2x10  5\"  2x10  5\"   Physioball abd isometric        LTP/MTP (posture)        St Hip 3 way 2x10 ea BLE 2x10 ea  BLE 20x ea BLE     St trunk ext 2x10  at table 2x10  at table  At table 2x10    2x10 at table           Ther Ex        Nustep for LE strength/ROM L3  15' L4 15' L4  15' L3  12' L3  15'   TR/HR        Mini squat  2x10      Seated trunk flexion stretch w/ physioball Flex, R/L 10x ea  10-15\" 10\"x15  Fwd,  R/L  Flex, R/L  10x  10\" ea Flex, R/L 10x 10-15\"  ea Flex, R/L 10x 10-15\"  ea   Seated lateral trunk stretch      " "  Supine clam shell     Green 20x  5\"   SKTC    10x  10\"  lawson    Self HS stretch    Supine w/ strap 10x  15\"  lawson Supine w/ strap 10x 15\" BLE   Self Piri stretch 10x 10\"  BLE 10x  10\"  BLE  Supine 10x 15\"  lawson    LTR  5\"x20 20x  5\" 2x10  5\" 2x10  5\"           HEP Reviewed    Updated HEP  ([id number])    Ther Activity                        Gait Training                        Modalities        MHP/CP 5'  L-spine Declined   5'  L-spine                             "

## 2024-12-04 ENCOUNTER — APPOINTMENT (OUTPATIENT)
Dept: PHYSICAL THERAPY | Facility: CLINIC | Age: 84
End: 2024-12-04
Payer: MEDICARE

## 2024-12-09 ENCOUNTER — OFFICE VISIT (OUTPATIENT)
Dept: PHYSICAL THERAPY | Facility: CLINIC | Age: 84
End: 2024-12-09
Payer: MEDICARE

## 2024-12-09 DIAGNOSIS — M25.552 HIP PAIN, BILATERAL: ICD-10-CM

## 2024-12-09 DIAGNOSIS — M25.551 HIP PAIN, BILATERAL: ICD-10-CM

## 2024-12-09 DIAGNOSIS — M54.50 ACUTE RIGHT-SIDED LOW BACK PAIN, UNSPECIFIED WHETHER SCIATICA PRESENT: Primary | ICD-10-CM

## 2024-12-09 PROCEDURE — 97110 THERAPEUTIC EXERCISES: CPT

## 2024-12-09 PROCEDURE — 97112 NEUROMUSCULAR REEDUCATION: CPT

## 2024-12-09 NOTE — PROGRESS NOTES
"Daily Note     Today's date: 2024  Patient name: Roslyn Manzo  : 1940  MRN: [id number]  Referring provider: Adina Schwarz CRNP  Dx:   Encounter Diagnosis     ICD-10-CM    1. Acute right-sided low back pain, unspecified whether sciatica present  M54.50       2. Hip pain, bilateral  M25.551     M25.552                      Subjective:  I'm doing good      Objective: See treatment diary below      Assessment: Tolerated treatment well.   Continues to report improved symptoms and mobility.  Pain central lumbar region with prolonged standing/walking.  To continue this week then transition to HEP.  Patient would benefit from continued PT      Plan: Continue per plan of care.       Precautions: Hx Falls,  Hx L-spine decompression surgery, Bilateral TKR       Manuals 24   Gentle stretch/PROM BLE                                Neuro Re-Ed         PPT        PPT w/ march        Bridge 2x10  5\" 2x10  5\" 2x10  5\" 2x10  5\" 2x10  5\"   Add sq  2x10  5\" 2x10  5\" 20x  5\" 2x10  5\"   Physioball abd isometric        LTP/MTP (posture)        St Hip 3 way 2x10 ea BLE 2x10 ea  BLE 20x ea BLE 20x ea BLE    St trunk ext 2x10  at table 2x10  at table  At table 2x10   20x at table 2x10 at table           Ther Ex        Nustep for LE strength/ROM L3  15' L4 15' L4  15' L4  15' L3  15'   TR/HR        Mini squat  2x10      Seated trunk flexion stretch w/ physioball Flex, R/L 10x ea  10-15\" 10\"x15  Fwd,  R/L  Flex, R/L  10x  10\" ea Flex, R/L 15x 10-15\"  ea Flex, R/L 10x 10-15\"  ea   Seated lateral trunk stretch        Supine clam shell     Green 20x  5\"   SKTC        Self HS stretch     Supine w/ strap 10x 15\" BLE   Self Piri stretch 10x 10\"  BLE 10x  10\"  BLE      LTR  5\"x20 20x  5\" 2x10  5\" 2x10  5\"           HEP Reviewed        Ther Activity                        Gait Training                        Modalities        MHP/CP 5'  L-spine Declined                                "

## 2024-12-11 ENCOUNTER — RA CDI HCC (OUTPATIENT)
Dept: OTHER | Facility: HOSPITAL | Age: 84
End: 2024-12-11

## 2024-12-12 ENCOUNTER — APPOINTMENT (OUTPATIENT)
Dept: PHYSICAL THERAPY | Facility: CLINIC | Age: 84
End: 2024-12-12
Payer: MEDICARE

## 2024-12-16 ENCOUNTER — TELEPHONE (OUTPATIENT)
Age: 84
End: 2024-12-16

## 2024-12-16 NOTE — TELEPHONE ENCOUNTER
Spoke with pt and made her appt in May as she will be in [place] for the Winter and will be coming back end of Apri.     Pt also wanted to go over her sleep study results. Only ones in chart were from 3 years ago pt states those were the ones. Went over those results as well.

## 2024-12-19 ENCOUNTER — APPOINTMENT (OUTPATIENT)
Dept: LAB | Facility: CLINIC | Age: 84
End: 2024-12-19
Payer: MEDICARE

## 2024-12-19 ENCOUNTER — OFFICE VISIT (OUTPATIENT)
Dept: FAMILY MEDICINE CLINIC | Facility: CLINIC | Age: 84
End: 2024-12-19
Payer: MEDICARE

## 2024-12-19 VITALS
TEMPERATURE: 97.5 F | OXYGEN SATURATION: 94 % | SYSTOLIC BLOOD PRESSURE: 130 MMHG | DIASTOLIC BLOOD PRESSURE: 70 MMHG | HEIGHT: 62 IN | BODY MASS INDEX: 29.47 KG/M2 | WEIGHT: 160.13 LBS | HEART RATE: 84 BPM

## 2024-12-19 DIAGNOSIS — Z00.00 MEDICARE ANNUAL WELLNESS VISIT, SUBSEQUENT: Primary | ICD-10-CM

## 2024-12-19 DIAGNOSIS — Z13.29 THYROID DISORDER SCREEN: ICD-10-CM

## 2024-12-19 DIAGNOSIS — Z13.1 DIABETES MELLITUS SCREENING: ICD-10-CM

## 2024-12-19 DIAGNOSIS — Z13.220 LIPID SCREENING: ICD-10-CM

## 2024-12-19 DIAGNOSIS — G47.33 OSA (OBSTRUCTIVE SLEEP APNEA): ICD-10-CM

## 2024-12-19 LAB
ALBUMIN SERPL BCG-MCNC: 4.2 G/DL (ref 3.5–5)
ALP SERPL-CCNC: 82 U/L (ref 34–104)
ALT SERPL W P-5'-P-CCNC: 18 U/L (ref 7–52)
ANION GAP SERPL CALCULATED.3IONS-SCNC: 10 MMOL/L (ref 4–13)
AST SERPL W P-5'-P-CCNC: 21 U/L (ref 13–39)
BILIRUB SERPL-MCNC: 0.64 MG/DL (ref 0.2–1)
BUN SERPL-MCNC: 18 MG/DL (ref 5–25)
CALCIUM SERPL-MCNC: 9.4 MG/DL (ref 8.4–10.2)
CHLORIDE SERPL-SCNC: 104 MMOL/L (ref 96–108)
CHOLEST SERPL-MCNC: 174 MG/DL (ref ?–200)
CO2 SERPL-SCNC: 28 MMOL/L (ref 21–32)
CREAT SERPL-MCNC: 0.78 MG/DL (ref 0.6–1.3)
ERYTHROCYTE [DISTWIDTH] IN BLOOD BY AUTOMATED COUNT: 14.4 % (ref 11.6–15.1)
EST. AVERAGE GLUCOSE BLD GHB EST-MCNC: 137 MG/DL
GFR SERPL CREATININE-BSD FRML MDRD: 70 ML/MIN/1.73SQ M
GLUCOSE P FAST SERPL-MCNC: 108 MG/DL (ref 65–99)
HBA1C MFR BLD: 6.4 %
HCT VFR BLD AUTO: 41.8 % (ref 34.8–46.1)
HDLC SERPL-MCNC: 58 MG/DL
HGB BLD-MCNC: 14 G/DL (ref 11.5–15.4)
LDLC SERPL CALC-MCNC: 94 MG/DL (ref 0–100)
MCH RBC QN AUTO: 29.8 PG (ref 26.8–34.3)
MCHC RBC AUTO-ENTMCNC: 33.5 G/DL (ref 31.4–37.4)
MCV RBC AUTO: 89 FL (ref 82–98)
NONHDLC SERPL-MCNC: 116 MG/DL
PLATELET # BLD AUTO: 283 THOUSANDS/UL (ref 149–390)
PMV BLD AUTO: 9.6 FL (ref 8.9–12.7)
POTASSIUM SERPL-SCNC: 3.9 MMOL/L (ref 3.5–5.3)
PROT SERPL-MCNC: 6.5 G/DL (ref 6.4–8.4)
RBC # BLD AUTO: 4.7 MILLION/UL (ref 3.81–5.12)
SODIUM SERPL-SCNC: 142 MMOL/L (ref 135–147)
TRIGL SERPL-MCNC: 110 MG/DL (ref ?–150)
TSH SERPL DL<=0.05 MIU/L-ACNC: 1.35 UIU/ML (ref 0.45–4.5)
WBC # BLD AUTO: 5.42 THOUSAND/UL (ref 4.31–10.16)

## 2024-12-19 PROCEDURE — 36415 COLL VENOUS BLD VENIPUNCTURE: CPT

## 2024-12-19 PROCEDURE — 84443 ASSAY THYROID STIM HORMONE: CPT

## 2024-12-19 PROCEDURE — 99213 OFFICE O/P EST LOW 20 MIN: CPT | Performed by: STUDENT IN AN ORGANIZED HEALTH CARE EDUCATION/TRAINING PROGRAM

## 2024-12-19 PROCEDURE — 85027 COMPLETE CBC AUTOMATED: CPT

## 2024-12-19 PROCEDURE — 80053 COMPREHEN METABOLIC PANEL: CPT

## 2024-12-19 PROCEDURE — 83036 HEMOGLOBIN GLYCOSYLATED A1C: CPT

## 2024-12-19 PROCEDURE — G0439 PPPS, SUBSEQ VISIT: HCPCS | Performed by: STUDENT IN AN ORGANIZED HEALTH CARE EDUCATION/TRAINING PROGRAM

## 2024-12-19 PROCEDURE — 80061 LIPID PANEL: CPT

## 2024-12-19 NOTE — ASSESSMENT & PLAN NOTE
Using CPAP machine nightly, patient reports that she has been having issues with filter, will call pulmonologist office to troubleshoot.  Orders:  •  CBC and Platelet; Future

## 2024-12-19 NOTE — PATIENT INSTRUCTIONS
Medicare Preventive Visit Patient Instructions  Thank you for completing your Welcome to Medicare Visit or Medicare Annual Wellness Visit today. Your next wellness visit will be due in one year (12/20/2025).  The screening/preventive services that you may require over the next 5-10 years are detailed below. Some tests may not apply to you based off risk factors and/or age. Screening tests ordered at today's visit but not completed yet may show as past due. Also, please note that scanned in results may not display below.  Preventive Screenings:  Service Recommendations Previous Testing/Comments   Colorectal Cancer Screening  * Colonoscopy    * Fecal Occult Blood Test (FOBT)/Fecal Immunochemical Test (FIT)  * Fecal DNA/Cologuard Test  * Flexible Sigmoidoscopy Age: 45-75 years old   Colonoscopy: every 10 years (may be performed more frequently if at higher risk)  OR  FOBT/FIT: every 1 year  OR  Cologuard: every 3 years  OR  Sigmoidoscopy: every 5 years  Screening may be recommended earlier than age 45 if at higher risk for colorectal cancer. Also, an individualized decision between you and your healthcare provider will decide whether screening between the ages of 76-85 would be appropriate. Colonoscopy: Not on file  FOBT/FIT: Not on file  Cologuard: Not on file  Sigmoidoscopy: Not on file          Breast Cancer Screening Age: 40+ years old  Frequency: every 1-2 years  Not required if history of left and right mastectomy Mammogram: 06/09/2014        Cervical Cancer Screening Between the ages of 21-29, pap smear recommended once every 3 years.   Between the ages of 30-65, can perform pap smear with HPV co-testing every 5 years.   Recommendations may differ for women with a history of total hysterectomy, cervical cancer, or abnormal pap smears in past. Pap Smear: Not on file    Screening Not Indicated   Hepatitis C Screening Once for adults born between 1945 and 1965  More frequently in patients at high risk for Hepatitis  C Hep C Antibody: Not on file        Diabetes Screening 1-2 times per year if you're at risk for diabetes or have pre-diabetes Fasting glucose: 97 mg/dL (12/14/2023)  A1C: 6.1 % (4/20/2022)      Cholesterol Screening Once every 5 years if you don't have a lipid disorder. May order more often based on risk factors. Lipid panel: 12/14/2023    Screening Not Indicated  History Lipid Disorder     Other Preventive Screenings Covered by Medicare:  Abdominal Aortic Aneurysm (AAA) Screening: covered once if your at risk. You're considered to be at risk if you have a family history of AAA.  Lung Cancer Screening: covers low dose CT scan once per year if you meet all of the following conditions: (1) Age 55-77; (2) No signs or symptoms of lung cancer; (3) Current smoker or have quit smoking within the last 15 years; (4) You have a tobacco smoking history of at least 20 pack years (packs per day multiplied by number of years you smoked); (5) You get a written order from a healthcare provider.  Glaucoma Screening: covered annually if you're considered high risk: (1) You have diabetes OR (2) Family history of glaucoma OR (3)  aged 50 and older OR (4)  American aged 65 and older  Osteoporosis Screening: covered every 2 years if you meet one of the following conditions: (1) You're estrogen deficient and at risk for osteoporosis based off medical history and other findings; (2) Have a vertebral abnormality; (3) On glucocorticoid therapy for more than 3 months; (4) Have primary hyperparathyroidism; (5) On osteoporosis medications and need to assess response to drug therapy.   Last bone density test (DXA Scan): 05/03/2021.  HIV Screening: covered annually if you're between the age of 15-65. Also covered annually if you are younger than 15 and older than 65 with risk factors for HIV infection. For pregnant patients, it is covered up to 3 times per pregnancy.    Immunizations:  Immunization Recommendations    Influenza Vaccine Annual influenza vaccination during flu season is recommended for all persons aged >= 6 months who do not have contraindications   Pneumococcal Vaccine   * Pneumococcal conjugate vaccine = PCV13 (Prevnar 13), PCV15 (Vaxneuvance), PCV20 (Prevnar 20)  * Pneumococcal polysaccharide vaccine = PPSV23 (Pneumovax) Adults 19-65 yo with certain risk factors or if 65+ yo  If never received any pneumonia vaccine: recommend Prevnar 20 (PCV20)  Give PCV20 if previously received 1 dose of PCV13 or PPSV23   Hepatitis B Vaccine 3 dose series if at intermediate or high risk (ex: diabetes, end stage renal disease, liver disease)   Respiratory syncytial virus (RSV) Vaccine - COVERED BY MEDICARE PART D  * RSVPreF3 (Arexvy) CDC recommends that adults 60 years of age and older may receive a single dose of RSV vaccine using shared clinical decision-making (SCDM)   Tetanus (Td) Vaccine - COST NOT COVERED BY MEDICARE PART B Following completion of primary series, a booster dose should be given every 10 years to maintain immunity against tetanus. Td may also be given as tetanus wound prophylaxis.   Tdap Vaccine - COST NOT COVERED BY MEDICARE PART B Recommended at least once for all adults. For pregnant patients, recommended with each pregnancy.   Shingles Vaccine (Shingrix) - COST NOT COVERED BY MEDICARE PART B  2 shot series recommended in those 19 years and older who have or will have weakened immune systems or those 50 years and older     Health Maintenance Due:      Topic Date Due   • DXA SCAN  05/03/2026     Immunizations Due:  There are no preventive care reminders to display for this patient.  Advance Directives   What are advance directives?  Advance directives are legal documents that state your wishes and plans for medical care. These plans are made ahead of time in case you lose your ability to make decisions for yourself. Advance directives can apply to any medical decision, such as the treatments you want,  and if you want to donate organs.   What are the types of advance directives?  There are many types of advance directives, and each state has rules about how to use them. You may choose a combination of any of the following:  Living will:  This is a written record of the treatment you want. You can also choose which treatments you do not want, which to limit, and which to stop at a certain time. This includes surgery, medicine, IV fluid, and tube feedings.   Durable power of  for healthcare (DPAHC):  This is a written record that states who you want to make healthcare choices for you when you are unable to make them for yourself. This person, called a proxy, is usually a family member or a friend. You may choose more than 1 proxy.  Do not resuscitate (DNR) order:  A DNR order is used in case your heart stops beating or you stop breathing. It is a request not to have certain forms of treatment, such as CPR. A DNR order may be included in other types of advance directives.  Medical directive:  This covers the care that you want if you are in a coma, near death, or unable to make decisions for yourself. You can list the treatments you want for each condition. Treatment may include pain medicine, surgery, blood transfusions, dialysis, IV or tube feedings, and a ventilator (breathing machine).  Values history:  This document has questions about your views, beliefs, and how you feel and think about life. This information can help others choose the care that you would choose.  Why are advance directives important?  An advance directive helps you control your care. Although spoken wishes may be used, it is better to have your wishes written down. Spoken wishes can be misunderstood, or not followed. Treatments may be given even if you do not want them. An advance directive may make it easier for your family to make difficult choices about your care.   Fall Prevention    Fall prevention  includes ways to make your  home and other areas safer. It also includes ways you can move more carefully to prevent a fall. Health conditions that cause changes in your blood pressure, vision, or muscle strength and coordination may increase your risk for falls. Medicines may also increase your risk for falls if they make you dizzy, weak, or sleepy.   Fall prevention tips:   Stand or sit up slowly.    Use assistive devices as directed.    Wear shoes that fit well and have soles that .    Wear a personal alarm.    Stay active.    Manage your medical conditions.    Home Safety Tips:  Add items to prevent falls in the bathroom.    Keep paths clear.    Install bright lights in your home.    Keep items you use often on shelves within reach.    Paint or place reflective tape on the edges of your stairs.    Weight Management   Why it is important to manage your weight:  Being overweight increases your risk of health conditions such as heart disease, high blood pressure, type 2 diabetes, and certain types of cancer. It can also increase your risk for osteoarthritis, sleep apnea, and other respiratory problems. Aim for a slow, steady weight loss. Even a small amount of weight loss can lower your risk of health problems.  How to lose weight safely:  A safe and healthy way to lose weight is to eat fewer calories and get regular exercise. You can lose up about 1 pound a week by decreasing the number of calories you eat by 500 calories each day.   Healthy meal plan for weight management:  A healthy meal plan includes a variety of foods, contains fewer calories, and helps you stay healthy. A healthy meal plan includes the following:  Eat whole-grain foods more often.  A healthy meal plan should contain fiber. Fiber is the part of grains, fruits, and vegetables that is not broken down by your body. Whole-grain foods are healthy and provide extra fiber in your diet. Some examples of whole-grain foods are whole-wheat breads and pastas, oatmeal, brown  rice, and bulgur.  Eat a variety of vegetables every day.  Include dark, leafy greens such as spinach, kale, niharika greens, and mustard greens. Eat yellow and orange vegetables such as carrots, sweet potatoes, and winter squash.   Eat a variety of fruits every day.  Choose fresh or canned fruit (canned in its own juice or light syrup) instead of juice. Fruit juice has very little or no fiber.  Eat low-fat dairy foods.  Drink fat-free (skim) milk or 1% milk. Eat fat-free yogurt and low-fat cottage cheese. Try low-fat cheeses such as mozzarella and other reduced-fat cheeses.  Choose meat and other protein foods that are low in fat.  Choose beans or other legumes such as split peas or lentils. Choose fish, skinless poultry (chicken or turkey), or lean cuts of red meat (beef or pork). Before you cook meat or poultry, cut off any visible fat.   Use less fat and oil.  Try baking foods instead of frying them. Add less fat, such as margarine, sour cream, regular salad dressing and mayonnaise to foods. Eat fewer high-fat foods. Some examples of high-fat foods include french fries, doughnuts, ice cream, and cakes.  Eat fewer sweets.  Limit foods and drinks that are high in sugar. This includes candy, cookies, regular soda, and sweetened drinks.  Exercise:  Exercise at least 30 minutes per day on most days of the week. Some examples of exercise include walking, biking, dancing, and swimming. You can also fit in more physical activity by taking the stairs instead of the elevator or parking farther away from stores. Ask your healthcare provider about the best exercise plan for you.      © Copyright [employer] 2018 Information is for End User's use only and may not be sold, redistributed or otherwise used for commercial purposes. All illustrations and images included in CareNotes® are the copyrighted property of [employer] or [employer]

## 2024-12-19 NOTE — ASSESSMENT & PLAN NOTE
Medicare annual wellness visit completed, patient not due for any preventative screenings at this time.  Routine blood work has been ordered and I will follow-up pending results.  Falls plan of care has been addressed this patient did have a recent fall.

## 2024-12-19 NOTE — PROGRESS NOTES
Name: Roslyn Manzo      : 1940      MRN: [id number]  Encounter Provider: Lelia Mccord DO  Encounter Date: 2024   Encounter department: [facility] PRIMARY CARE    Assessment & Plan  Medicare annual wellness visit, subsequent  Medicare annual wellness visit completed, patient not due for any preventative screenings at this time.  Routine blood work has been ordered and I will follow-up pending results.  Falls plan of care has been addressed this patient did have a recent fall.       Lipid screening    Orders:  •  Lipid panel; Future  •  CBC and Platelet; Future    Diabetes mellitus screening    Orders:  •  Hemoglobin A1C; Future  •  Comprehensive metabolic panel; Future  •  CBC and Platelet; Future    Thyroid disorder screen    Orders:  •  TSH, 3rd generation with Free T4 reflex; Future  •  CBC and Platelet; Future    CHUYITA (obstructive sleep apnea)  Using CPAP machine nightly, patient reports that she has been having issues with filter, will call pulmonologist office to troubleshoot.  Orders:  •  CBC and Platelet; Future      Depression Screening and Follow-up Plan: Patient was screened for depression during today's encounter. They screened negative with a PHQ-9 score of 0.    Falls Plan of Care: balance, strength, and gait training instructions were provided. Recommended assistive device to help with gait and balance. Medications that increase falls were reviewed.       Preventive health issues were discussed with patient, and age appropriate screening tests were ordered as noted in patient's After Visit Summary. Personalized health advice and appropriate referrals for health education or preventive services given if needed, as noted in patient's After Visit Summary.    History of Present Illness     Patient presents today for annual wellness visit.       Patient Care Team:  Lelia Mccord DO as PCP - General (Family Medicine)  EBONY Gray,  MD Basim Valladares MD    Review of Systems   Constitutional:  Negative for chills and fever.   HENT:  Negative for congestion and rhinorrhea.    Respiratory:  Negative for cough and shortness of breath.    Cardiovascular:  Negative for chest pain and palpitations.   Gastrointestinal:  Negative for abdominal pain, constipation and diarrhea.   Neurological:  Negative for dizziness and headaches.     Medical History Reviewed by provider this encounter:  Tobacco  Allergies  Meds  Problems  Med Hx  Surg Hx  Fam Hx       Annual Wellness Visit Questionnaire   Roslyn is here for her Subsequent Wellness visit.     Health Risk Assessment:   Patient rates overall health as good. Patient feels that their physical health rating is same. Patient is satisfied with their life. Eyesight was rated as same. Hearing was rated as slightly worse. Patient feels that their emotional and mental health rating is same. Patients states they are never, rarely angry. Patient states they are often unusually tired/fatigued. Pain experienced in the last 7 days has been some. Patient's pain rating has been 2/10. Patient states that she has experienced no weight loss or gain in last 6 months.     Depression Screening:   PHQ-9 Score: 0      Fall Risk Screening:   In the past year, patient has experienced: history of falling in past year    Number of falls: 1  Injured during fall?: Yes    Feels unsteady when standing or walking?: No    Worried about falling?: No      Urinary Incontinence Screening:   Patient has not leaked urine accidently in the last six months.     Home Safety:  Patient does not have trouble with stairs inside or outside of their home. Patient has working smoke alarms and has working carbon monoxide detector. Home safety hazards include: none.     Nutrition:   Current diet is Regular.     Medications:   Patient is currently taking over-the-counter supplements. OTC medications include: see medication list. Patient is able to  manage medications.     Activities of Daily Living (ADLs)/Instrumental Activities of Daily Living (IADLs):   Walk and transfer into and out of bed and chair?: Yes  Dress and groom yourself?: Yes    Bathe or shower yourself?: Yes    Feed yourself? Yes  Do your laundry/housekeeping?: Yes  Manage your money, pay your bills and track your expenses?: Yes  Make your own meals?: Yes    Do your own shopping?: Yes    Previous Hospitalizations:   Any hospitalizations or ED visits within the last 12 months?: No    How many hospitalizations have you had in the last year?: 1-2    Advance Care Planning:   Living will: Yes    Durable POA for healthcare: No    Advanced directive: Yes      PREVENTIVE SCREENINGS      Cardiovascular Screening:    General: History Lipid Disorder and Risks and Benefits Discussed    Due for: Lipid Panel      Diabetes Screening:     General: Risks and Benefits Discussed    Due for: Blood Glucose      Colorectal Cancer Screening:     General: Screening Not Indicated      Breast Cancer Screening:     General: Screening Not Indicated      Cervical Cancer Screening:    General: Screening Not Indicated      Osteoporosis Screening:    General: Screening Not Indicated      Abdominal Aortic Aneurysm (AAA) Screening:        General: Screening Not Indicated      Lung Cancer Screening:     General: Screening Not Indicated    Screening, Brief Intervention, and Referral to Treatment (SBIRT)    Screening  Typical number of drinks in a day: 0  Typical number of drinks in a week: 0  Interpretation: Low risk drinking behavior.    AUDIT-C Screenin) How often did you have a drink containing alcohol in the past year? 2 to 4 times a month  2) How many drinks did you have on a typical day when you were drinking in the past year? 1 to 2  3) How often did you have 6 or more drinks on one occasion in the past year? never    AUDIT-C Score: 2  Interpretation: Score 0-2 (female): Negative screen for alcohol misuse    Single  "Item Drug Screening:  How often have you used an illegal drug (including marijuana) or a prescription medication for non-medical reasons in the past year? never    Single Item Drug Screen Score: 0  Interpretation: Negative screen for possible drug use disorder    Social Drivers of Health     Financial Resource Strain: Low Risk  (12/14/2023)    Overall Financial Resource Strain (CARDIA)    • Difficulty of Paying Living Expenses: Not hard at all   Food Insecurity: No Food Insecurity (12/19/2024)    Hunger Vital Sign    • Worried About Running Out of Food in the Last Year: Never true    • Ran Out of Food in the Last Year: Never true   Transportation Needs: No Transportation Needs (12/19/2024)    PRAPARE - Transportation    • Lack of Transportation (Medical): No    • Lack of Transportation (Non-Medical): No   Housing Stability: Low Risk  (12/19/2024)    Housing Stability Vital Sign    • Unable to Pay for Housing in the Last Year: No    • Number of Times Moved in the Last Year: 0    • Homeless in the Last Year: No   Utilities: Not At Risk (12/19/2024)    [facility] Utilities    • Threatened with loss of utilities: No     No results found.    Objective   /70 (BP Location: Left arm, Patient Position: Sitting, Cuff Size: Large)   Pulse 84   Temp 97.5 °F (36.4 °C) (Temporal)   Ht 5' 2\" (1.575 m)   Wt 72.6 kg (160 lb 2 oz)   SpO2 94%   BMI 29.29 kg/m²     Physical Exam  Vitals reviewed.   Constitutional:       Appearance: Normal appearance.   HENT:      Head: Normocephalic and atraumatic.      Mouth/Throat:      Mouth: Mucous membranes are moist.      Pharynx: No oropharyngeal exudate or posterior oropharyngeal erythema.   Eyes:      Extraocular Movements: Extraocular movements intact.   Cardiovascular:      Rate and Rhythm: Normal rate and regular rhythm.      Heart sounds: Normal heart sounds.   Pulmonary:      Effort: Pulmonary effort is normal.      Breath sounds: Normal breath sounds.   Musculoskeletal:      " Cervical back: Neck supple.   Neurological:      General: No focal deficit present.      Mental Status: She is alert and oriented to person, place, and time.   Psychiatric:         Mood and Affect: Mood normal.         Behavior: Behavior normal.

## 2024-12-20 NOTE — TELEPHONE ENCOUNTER
Patient calling she is having a hard time getting the filter into her new machine. She is leaving for 4 months to [place] and needs the machine. Asking if she can come into the office and have someone help her put the filter in. She said she is 84, her and her friend were trying to google it but had a hard time. Please let her know if she can come into the office and what time. She has been working [employer] from [place], but she has to drive up from [place] to help her.

## 2024-12-20 NOTE — TELEPHONE ENCOUNTER
Patient called rx refill line stating she went to a friend and the issue with her CPAP is resolved. Patient was mistakenly inputting two filters, not realizing.    All is well.     Patient would like to wish everyone a Radha Castillo!

## 2024-12-24 ENCOUNTER — RESULTS FOLLOW-UP (OUTPATIENT)
Dept: FAMILY MEDICINE CLINIC | Facility: CLINIC | Age: 84
End: 2024-12-24

## 2024-12-24 NOTE — TELEPHONE ENCOUNTER
Left a detailed message on machine informing patient and to call back if she has questions     ----- Message from Lelia Mccord, DO sent at 12/24/2024 10:52 AM EST -----  Please let patient know that I have reviewed her labs. Her A1c continues to creep up and she is borderline diabetic, her A1c is 6.4 and diabetes starts at 6.5. I am going to recheck the A1c at her next visit but she should make some small dietary changes like cutting back on sweets, carbs and alcohol. All of her other labs look fine.

## 2025-01-15 ENCOUNTER — TELEPHONE (OUTPATIENT)
Age: 85
End: 2025-01-15

## 2025-01-15 NOTE — TELEPHONE ENCOUNTER
Left message for patient to call the office once she is back to schedule an appointment per Dr. Mccord, also Dr Mccord sent her some information via [employer] Message.

## 2025-01-15 NOTE — TELEPHONE ENCOUNTER
"Patient calling to get the results of her most recent labs. Read verbatim the following message from Dr. Mccord: \"Please let patient know that I have reviewed her labs. Her A1c continues to creep up and she is borderline diabetic, her A1c is 6.4 and diabetes starts at 6.5. I am going to recheck the A1c at her next visit but she should make some small dietary changes like cutting back on sweets, carbs and alcohol. All of her other labs look fine.\"    Patient is very concerned about this as she has been eating nothing but sugar, carbs and alcohol while staying in [place] right now. She will try to cut back but wants information and education on diabetes and the proper diet. Patient thought that carbs were protein and does not know how to proceed. She would like any pamphlets or information to be mailed to her home address.       "

## 2025-01-16 NOTE — TELEPHONE ENCOUNTER
Pt called in to return missed call from office. Triage nurse read PCPs message back to pt and pt stated he will call back to schedule an appt when she's back from [place]. Pt understands what she needs to do for her diet. Nothing further to note at this time.

## 2025-01-18 PROBLEM — Z00.00 MEDICARE ANNUAL WELLNESS VISIT, SUBSEQUENT: Status: RESOLVED | Noted: 2023-05-10 | Resolved: 2025-01-18

## 2025-01-30 DIAGNOSIS — E78.49 OTHER HYPERLIPIDEMIA: ICD-10-CM

## 2025-01-31 DIAGNOSIS — J44.89 ASTHMA WITH COPD (CHRONIC OBSTRUCTIVE PULMONARY DISEASE) (HCC): ICD-10-CM

## 2025-01-31 DIAGNOSIS — J30.2 SEASONAL ALLERGIES: ICD-10-CM

## 2025-01-31 RX ORDER — BUDESONIDE AND FORMOTEROL FUMARATE DIHYDRATE 160; 4.5 UG/1; UG/1
AEROSOL RESPIRATORY (INHALATION)
Qty: 30.6 G | Refills: 1 | Status: SHIPPED | OUTPATIENT
Start: 2025-01-31

## 2025-01-31 RX ORDER — MONTELUKAST SODIUM 10 MG/1
10 TABLET ORAL
Qty: 90 TABLET | Refills: 3 | Status: SHIPPED | OUTPATIENT
Start: 2025-01-31

## 2025-01-31 RX ORDER — SIMVASTATIN 10 MG
10 TABLET ORAL
Qty: 90 TABLET | Refills: 3 | Status: SHIPPED | OUTPATIENT
Start: 2025-01-31

## 2025-02-04 ENCOUNTER — TELEPHONE (OUTPATIENT)
Age: 85
End: 2025-02-04

## 2025-02-04 NOTE — TELEPHONE ENCOUNTER
Patient calls asking about her recent A1c results. We reviewed them and the result note from Dr. Mccord. Patient states she has felt fatigued as of late and has been taking longer naps than she is used to. She noted the warmer weather where she currently is in [place] and thought it could be attributed to that or her blood sugar. Patient does not have a glucometer with her.     Due to her distance away, patient states she will go to urgent care to be evaluated.     She will call back with any concerns.

## 2025-02-28 DIAGNOSIS — E03.9 ACQUIRED HYPOTHYROIDISM: Primary | ICD-10-CM

## 2025-02-28 DIAGNOSIS — I10 ESSENTIAL HYPERTENSION: ICD-10-CM

## 2025-03-03 RX ORDER — HYDROCHLOROTHIAZIDE 25 MG/1
25 TABLET ORAL EVERY MORNING
Qty: 90 TABLET | Refills: 3 | Status: SHIPPED | OUTPATIENT
Start: 2025-03-03

## 2025-03-03 RX ORDER — LEVOTHYROXINE SODIUM 50 UG/1
50 TABLET ORAL DAILY
Qty: 90 TABLET | Refills: 1 | Status: SHIPPED | OUTPATIENT
Start: 2025-03-03

## 2025-04-07 ENCOUNTER — OFFICE VISIT (OUTPATIENT)
Dept: FAMILY MEDICINE CLINIC | Facility: CLINIC | Age: 85
End: 2025-04-07
Payer: MEDICARE

## 2025-04-07 ENCOUNTER — APPOINTMENT (OUTPATIENT)
Dept: LAB | Facility: CLINIC | Age: 85
End: 2025-04-07
Payer: MEDICARE

## 2025-04-07 VITALS
OXYGEN SATURATION: 96 % | HEIGHT: 62 IN | SYSTOLIC BLOOD PRESSURE: 118 MMHG | WEIGHT: 157.38 LBS | TEMPERATURE: 97.6 F | DIASTOLIC BLOOD PRESSURE: 80 MMHG | BODY MASS INDEX: 28.96 KG/M2 | HEART RATE: 92 BPM

## 2025-04-07 DIAGNOSIS — R73.03 PREDIABETES: ICD-10-CM

## 2025-04-07 DIAGNOSIS — E03.9 ACQUIRED HYPOTHYROIDISM: ICD-10-CM

## 2025-04-07 DIAGNOSIS — F41.8 DEPRESSION WITH ANXIETY: ICD-10-CM

## 2025-04-07 DIAGNOSIS — J41.0 SIMPLE CHRONIC BRONCHITIS (HCC): Primary | ICD-10-CM

## 2025-04-07 LAB — TSH SERPL DL<=0.05 MIU/L-ACNC: 2.82 UIU/ML (ref 0.45–4.5)

## 2025-04-07 PROCEDURE — 84443 ASSAY THYROID STIM HORMONE: CPT

## 2025-04-07 PROCEDURE — 83036 HEMOGLOBIN GLYCOSYLATED A1C: CPT

## 2025-04-07 PROCEDURE — 99214 OFFICE O/P EST MOD 30 MIN: CPT | Performed by: STUDENT IN AN ORGANIZED HEALTH CARE EDUCATION/TRAINING PROGRAM

## 2025-04-07 PROCEDURE — 36415 COLL VENOUS BLD VENIPUNCTURE: CPT

## 2025-04-07 PROCEDURE — G2211 COMPLEX E/M VISIT ADD ON: HCPCS | Performed by: STUDENT IN AN ORGANIZED HEALTH CARE EDUCATION/TRAINING PROGRAM

## 2025-04-07 RX ORDER — FLUOXETINE HYDROCHLORIDE 40 MG/1
40 CAPSULE ORAL 2 TIMES DAILY
Qty: 60 CAPSULE | Refills: 3 | Status: SHIPPED | OUTPATIENT
Start: 2025-04-07

## 2025-04-07 NOTE — ASSESSMENT & PLAN NOTE
Depression Screening Follow-up Plan: Patient's depression screening was positive with a PHQ-9 score of 7. Patient with underlying depression and was advised to continue current medications as prescribed.    Refill for Prozac sent to pharmacy.

## 2025-04-07 NOTE — ASSESSMENT & PLAN NOTE
Breathing currently stable, patient to continue on nebulizer and albuterol rescue inhaler as needed.  Should continue Symbicort daily.

## 2025-04-07 NOTE — ASSESSMENT & PLAN NOTE
Most recent A1c 6.4, patient does endorse poor eating as well as regular alcohol use.  Repeat A1c pending at this time, patient states that she will not be discontinuing alcohol use but is agreeable to cutting back as well as changing diet if needed.  Orders:    Hemoglobin A1C; Future

## 2025-04-07 NOTE — PROGRESS NOTES
Name: Roslyn Manzo      : 1940      MRN: [id number]  Encounter Provider: Lelia Mccord DO  Encounter Date: 2025   Encounter department: [facility] PRIMARY CARE  :  Assessment & Plan  Simple chronic bronchitis (HCC)  Breathing currently stable, patient to continue on nebulizer and albuterol rescue inhaler as needed.  Should continue Symbicort daily.       Acquired hypothyroidism  Patient reports cold intolerance, does report compliance with thyroid medication, does have history of unstable thyroid function even while on medication.  At this time we will recheck TSH and will follow-up pending results.  Orders:    TSH, 3rd generation with Free T4 reflex; Future    Prediabetes  Most recent A1c 6.4, patient does endorse poor eating as well as regular alcohol use.  Repeat A1c pending at this time, patient states that she will not be discontinuing alcohol use but is agreeable to cutting back as well as changing diet if needed.  Orders:    Hemoglobin A1C; Future    Depression with anxiety  e depression Screening Follow-up Plan: Patient's depression screening was positive with a PHQ-9 score of 7. Patient with underlying depression and was advised to continue current medications as prescribed.    Orders:    FLUoxetine (PROzac) 40 MG capsule; Take 1 capsule (40 mg total) by mouth 2 (two) times a day          Depression Screening and Follow-up Plan: Patient's depression screening was positive with a PHQ-9 score of 7.   Patient with underlying depression and was advised to continue current medications as prescribed.     Falls Plan of Care: balance, strength, and gait training instructions were provided. Recommended assistive device to help with gait and balance. Medications that increase falls were reviewed. Vitamin D supplementation was recommended.       History of Present Illness   Patient presents today for routine follow-up.  Reports that she recently returned from [place] was on  "extended vacation.  Does feel like she wants to reduce her Prozac as she feels like she has been coping well and dealing with people more effectively.      Review of Systems   Constitutional:  Negative for chills and fever.   HENT:  Negative for congestion and rhinorrhea.    Respiratory:  Negative for cough and shortness of breath.    Cardiovascular:  Negative for chest pain and palpitations.   Gastrointestinal:  Negative for abdominal pain, constipation, diarrhea, nausea and vomiting.   Neurological:  Negative for dizziness and headaches.       Objective   /80 (BP Location: Left arm, Patient Position: Sitting, Cuff Size: Adult)   Pulse 92   Temp 97.6 °F (36.4 °C) (Temporal)   Ht 5' 2\" (1.575 m)   Wt 71.4 kg (157 lb 6 oz)   SpO2 96%   BMI 28.78 kg/m²      Physical Exam  Vitals reviewed.   Constitutional:       Appearance: Normal appearance.   HENT:      Head: Normocephalic and atraumatic.   Eyes:      Extraocular Movements: Extraocular movements intact.   Cardiovascular:      Rate and Rhythm: Normal rate and regular rhythm.      Heart sounds: Normal heart sounds.   Pulmonary:      Effort: Pulmonary effort is normal.      Breath sounds: Normal breath sounds.   Neurological:      General: No focal deficit present.      Mental Status: She is alert and oriented to person, place, and time.   Psychiatric:         Mood and Affect: Mood normal.         Behavior: Behavior normal.         "

## 2025-04-07 NOTE — ASSESSMENT & PLAN NOTE
e depression Screening Follow-up Plan: Patient's depression screening was positive with a PHQ-9 score of 7. Patient with underlying depression and was advised to continue current medications as prescribed.    Orders:    FLUoxetine (PROzac) 40 MG capsule; Take 1 capsule (40 mg total) by mouth 2 (two) times a day

## 2025-04-07 NOTE — ASSESSMENT & PLAN NOTE
Patient reports cold intolerance, does report compliance with thyroid medication, does have history of unstable thyroid function even while on medication.  At this time we will recheck TSH and will follow-up pending results.  Orders:    TSH, 3rd generation with Free T4 reflex; Future

## 2025-04-08 ENCOUNTER — RESULTS FOLLOW-UP (OUTPATIENT)
Dept: FAMILY MEDICINE CLINIC | Facility: CLINIC | Age: 85
End: 2025-04-08

## 2025-04-08 LAB
EST. AVERAGE GLUCOSE BLD GHB EST-MCNC: 131 MG/DL
HBA1C MFR BLD: 6.2 %

## 2025-04-08 NOTE — RESULT ENCOUNTER NOTE
Called patient no answer, left message asking her to call office regarding labs.    Please let patient know that her thyroid function is normal.Her A1c has decreased, still in prediabetic range but lower. I would recommend continuing her levothyroxine at 50mcg daily

## 2025-04-10 NOTE — TELEPHONE ENCOUNTER
Pt returning call, stated she will like her lab results mailed to her because she can't get access to [facility] and she wants to be able to see her numbers and results.    Please advise, thank you.

## 2025-04-17 NOTE — PROGRESS NOTES
[address] [facility] coding opportunities       Chart reviewed, no opportunity found: CHART REVIEWED, NO OPPORTUNITY FOUND        Patients Insurance     Medicare Insurance: Medicare No

## 2025-05-01 ENCOUNTER — NURSE TRIAGE (OUTPATIENT)
Age: 85
End: 2025-05-01

## 2025-05-01 DIAGNOSIS — R68.89 COLD INTOLERANCE: Primary | ICD-10-CM

## 2025-05-01 DIAGNOSIS — E53.8 B12 DEFICIENCY: ICD-10-CM

## 2025-05-01 DIAGNOSIS — F41.8 DEPRESSION WITH ANXIETY: ICD-10-CM

## 2025-05-01 NOTE — TELEPHONE ENCOUNTER
Regarding: question about being constantly cold  ----- Message from Puja GUARDADO sent at 5/1/2025  2:18 PM EDT -----  Patient called in bc she is concerned bc she is always cold.  Her hands, feet, etc. Says she is under her blanket and has the fire place on and she is just freezing. She just wanted to know if its bc of her age, being that she's 84? She says her labs were good last time she checked. Just wanted to get some insight. Please advise. Thank you!

## 2025-05-01 NOTE — TELEPHONE ENCOUNTER
Reason for call:   [x] Refill   [] Prior Auth  [x] Other: NEW PHARMACY... Not a duplicate    Office:   [x] PCP/Provider - Lelia Mccord, DO   [] Specialty/Provider -     Medication:  FLUoxetine (PROzac) 40 MG capsule    Dose/Frequency: Take 1 capsule (40 mg total) by mouth 2 (two) times a day,     Quantity: 180    Pharmacy: [facility] [address] [facility]   Does the patient have enough for 3 days?   [x] Yes   [] No - Send as HP to POD    Mail Away Pharmacy   Does the patient have enough for 10 days?   [x] Yes   [] No - Send as HP to POD

## 2025-05-02 RX ORDER — FLUOXETINE HYDROCHLORIDE 40 MG/1
40 CAPSULE ORAL 2 TIMES DAILY
Qty: 180 CAPSULE | Refills: 1 | Status: SHIPPED | OUTPATIENT
Start: 2025-05-02

## 2025-05-05 ENCOUNTER — TELEPHONE (OUTPATIENT)
Age: 85
End: 2025-05-05

## 2025-05-05 NOTE — TELEPHONE ENCOUNTER
Pt calling to let us know that she would like to stop using her CPAP. She would like to let provider know

## 2025-05-06 DIAGNOSIS — G47.33 OSA (OBSTRUCTIVE SLEEP APNEA): Primary | ICD-10-CM

## 2025-05-07 ENCOUNTER — TELEPHONE (OUTPATIENT)
Age: 85
End: 2025-05-07

## 2025-05-07 NOTE — TELEPHONE ENCOUNTER
[place] calling from [facility] to inform us that pt does not need to return equipment she now own it.

## 2025-05-13 ENCOUNTER — OFFICE VISIT (OUTPATIENT)
Age: 85
End: 2025-05-13
Payer: MEDICARE

## 2025-05-13 VITALS
RESPIRATION RATE: 16 BRPM | HEIGHT: 62 IN | TEMPERATURE: 97.9 F | HEART RATE: 78 BPM | BODY MASS INDEX: 29.26 KG/M2 | SYSTOLIC BLOOD PRESSURE: 116 MMHG | DIASTOLIC BLOOD PRESSURE: 76 MMHG | WEIGHT: 159 LBS | OXYGEN SATURATION: 97 %

## 2025-05-13 DIAGNOSIS — G47.33 OSA (OBSTRUCTIVE SLEEP APNEA): ICD-10-CM

## 2025-05-13 DIAGNOSIS — J44.89 ASTHMA-COPD OVERLAP SYNDROME (HCC): Primary | ICD-10-CM

## 2025-05-13 DIAGNOSIS — Z91.09 ENVIRONMENTAL ALLERGIES: ICD-10-CM

## 2025-05-13 PROCEDURE — G2211 COMPLEX E/M VISIT ADD ON: HCPCS | Performed by: PHYSICIAN ASSISTANT

## 2025-05-13 PROCEDURE — 99214 OFFICE O/P EST MOD 30 MIN: CPT | Performed by: PHYSICIAN ASSISTANT

## 2025-05-13 NOTE — ASSESSMENT & PLAN NOTE
Continue on Symbicort twice per day, albuterol 4 times per day as needed, Singulair  Slightly increased symptoms due to the spring allergens.  I did tell her to try using the nebulizer once in the morning, can use it other times during the day if needed as well.

## 2025-05-13 NOTE — PROGRESS NOTES
Follow-up  Visit - Pulmonary Medicine   Name: Roslyn Manzo      : 1940      MRN: [id number]  Encounter Provider: Shawn Sepulveda PA-C  Encounter Date: 2025   Encounter department: [facility] PULMONARY [facility]  :  Assessment & Plan  Asthma-COPD overlap syndrome (HCC)  Continue on Symbicort twice per day, albuterol 4 times per day as needed, Singulair  Slightly increased symptoms due to the spring allergens.  I did tell her to try using the nebulizer once in the morning, can use it other times during the day if needed as well.       CHUYITA (obstructive sleep apnea)  Patient has been having trouble with her CPAP. She would like to try nasal pillows to see if it is better tolerated.   Orders:    PAP DME Resupply/Reorder    Environmental allergies  Continue Singulair. Can also add antihistamine such as Claritin, Zyrtec, etc especially in the spring months.          Return in about 6 months (around 2025).    History of Present Illness   Roslyn Manzo is a 84 y.o. female former smoker quit more than 20 years ago with past medical history of asthma/COPD, seasonal allergies, hiatal hernia, GERD, hypertension, obesity, CHUYITA who presents for follow up. She is overall doing well with her breathing. She has some increased cough due to the spring allergens.    Review of Systems   Constitutional: Negative.    HENT: Negative.     Respiratory:  Positive for cough.    Cardiovascular: Negative.    Gastrointestinal: Negative.    Genitourinary: Negative.    Musculoskeletal: Negative.    Skin: Negative.    Allergic/Immunologic: Positive for environmental allergies.   Neurological: Negative.    Psychiatric/Behavioral: Negative.         Aside from what is mentioned in the HPI, ROS is otherwise negative    Current Outpatient Medications on File Prior to Visit   Medication Sig Dispense Refill    albuterol (ACCUNEB) 0.63 MG/3ML nebulizer solution Take 3 mL (0.63 mg total) by nebulization every 6  "(six) hours as needed for wheezing or shortness of breath 1080 mL 3    albuterol (PROVENTIL HFA,VENTOLIN HFA) 90 mcg/act inhaler Inhale 1 puff every 6 (six) hours as needed for wheezing 18 g 3    famotidine (PEPCID) 40 MG tablet Take 1 tablet (40 mg total) by mouth in the morning 180 tablet 1    FLUoxetine (PROzac) 40 MG capsule Take 1 capsule (40 mg total) by mouth 2 (two) times a day 180 capsule 1    hydroCHLOROthiazide 25 mg tablet Take 1 tablet (25 mg total) by mouth every morning 90 tablet 3    ipratropium-albuterol (DUO-NEB) 0.5-2.5 mg/3 mL nebulizer solution Take 3 mL by nebulization 4 (four) times a day 360 mL 0    levothyroxine 50 mcg tablet Take 1 tablet (50 mcg total) by mouth daily 90 tablet 1    montelukast (SINGULAIR) 10 mg tablet TAKE 1 TABLET BY MOUTH DAILY AT  BEDTIME 90 tablet 3    pantoprazole (PROTONIX) 40 mg tablet TAKE 1 TABLET BY MOUTH EVERY DAY 30 tablet 5    simvastatin (ZOCOR) 10 mg tablet TAKE 1 TABLET BY MOUTH DAILY AT  BEDTIME 90 tablet 3    Symbicort 160-4.5 MCG/ACT inhaler USE 2 INHALATIONS BY MOUTH TWICE DAILY (RINSE MOUTH AFTER USE) 30.6 g 1     No current facility-administered medications on file prior to visit.         Medical History Reviewed by provider this encounter:     .    Objective   /76 (BP Location: Right arm, Patient Position: Sitting, Cuff Size: Large)   Pulse 78   Temp 97.9 °F (36.6 °C) (Oral)   Resp 16   Ht 5' 2\" (1.575 m)   Wt 72.1 kg (159 lb)   SpO2 97%   BMI 29.08 kg/m²     Physical Exam  Vitals reviewed.   Constitutional:       General: She is not in acute distress.     Appearance: Normal appearance. She is well-developed. She is not ill-appearing.   HENT:      Head: Normocephalic and atraumatic.      Mouth/Throat:      Pharynx: Oropharynx is clear.   Eyes:      Pupils: Pupils are equal, round, and reactive to light.   Cardiovascular:      Rate and Rhythm: Normal rate and regular rhythm.   Pulmonary:      Effort: Pulmonary effort is normal. No " respiratory distress.      Breath sounds: Normal breath sounds. No decreased breath sounds, wheezing, rhonchi or rales.   Abdominal:      General: Abdomen is flat. There is no distension.   Musculoskeletal:         General: Normal range of motion.      Cervical back: Normal range of motion.      Right lower leg: No edema.      Left lower leg: No edema.   Skin:     General: Skin is warm and dry.      Findings: No rash.   Neurological:      Mental Status: She is alert and oriented to person, place, and time.   Psychiatric:         Mood and Affect: Mood normal.         Behavior: Behavior normal.           Diagnostic Data:  Labs: I personally reviewed the most recent laboratory data pertinent to today's visit.      Radiology results:  Radiology Results Review : No pertinent imaging studies reviewed.      PFT/spirometry results:  PFT in 2019 showed no large airway obstruction or bronchodilator response, mild restriction, mild diffusion impairment      Oximetry testing:      Other studies:      Shawn Sepulveda PA-C

## 2025-05-13 NOTE — ASSESSMENT & PLAN NOTE
Patient has been having trouble with her CPAP. She would like to try nasal pillows to see if it is better tolerated.   Orders:    PAP DME Resupply/Reorder

## 2025-05-14 ENCOUNTER — TELEPHONE (OUTPATIENT)
Age: 85
End: 2025-05-14

## 2025-05-14 LAB
DME PARACHUTE DELIVERY DATE REQUESTED: NORMAL
DME PARACHUTE ITEM DESCRIPTION: NORMAL
DME PARACHUTE ITEM DESCRIPTION: NORMAL
DME PARACHUTE ORDER STATUS: NORMAL
DME PARACHUTE SUPPLIER NAME: NORMAL
DME PARACHUTE SUPPLIER PHONE: NORMAL

## 2025-05-14 NOTE — TELEPHONE ENCOUNTER
Pt calling in wanting to make sure that the order for her nasal pillow was sent to the [employer] company. Asked if she wanted call back she does not just wants to make sure order was sent.

## 2025-05-16 DIAGNOSIS — R13.10 DYSPHAGIA, UNSPECIFIED TYPE: ICD-10-CM

## 2025-05-16 DIAGNOSIS — K21.9 GASTROESOPHAGEAL REFLUX DISEASE WITHOUT ESOPHAGITIS: ICD-10-CM

## 2025-05-16 NOTE — TELEPHONE ENCOUNTER
Medication: pantoprazole (PROTONIX) 40 mg tablet     Dose/Frequency: TAKE 1 TABLET BY MOUTH EVERY DAY     Quantity: 90    Pharmacy: [facility]    Office:   [x] PCP/Provider -   [] Speciality/Provider -     Does the patient have enough for 3 days?   [] Yes   [x] No - Send as HP to POD

## 2025-05-19 ENCOUNTER — NURSE TRIAGE (OUTPATIENT)
Age: 85
End: 2025-05-19

## 2025-05-19 DIAGNOSIS — J44.9 CHRONIC OBSTRUCTIVE PULMONARY DISEASE, UNSPECIFIED COPD TYPE (HCC): Primary | ICD-10-CM

## 2025-05-19 RX ORDER — PREDNISONE 20 MG/1
40 TABLET ORAL DAILY
Qty: 10 TABLET | Refills: 0 | Status: SHIPPED | OUTPATIENT
Start: 2025-05-19 | End: 2025-05-24

## 2025-05-19 RX ORDER — PANTOPRAZOLE SODIUM 40 MG/1
40 TABLET, DELAYED RELEASE ORAL DAILY
Qty: 30 TABLET | Refills: 5 | Status: SHIPPED | OUTPATIENT
Start: 2025-05-19

## 2025-05-19 NOTE — TELEPHONE ENCOUNTER
"FOLLOW UP: Call back from provider. Pt requesting steroids.     REASON FOR CONVERSATION: Cough    SYMPTOMS: Patient calling with mild cough & feeling of pins & needles feeling to chest. Denies headaches, lightheadedness, dizziness, chest pain or pressure, left or right arm pain, nausea. Had one episode this am with coughing fit and while on phone. Pt able to communicate clearly and no signs or symptoms of distress.  Patient states when pins/needle feeling comes it does not make her breathing worse. Patient states she feels cough is coming from her throat does not want it to go into her chest. Intermittently can cough up small amount of sputum that is sticky and clear. Denies fever, SOB. Advised if she were to develop worsening pins and needles feeling, chest pain, chest pressure, difficulty breathing she needs to go to ER. Pt verbalized understanding she knows to press life alert button. Patient checked her pulse ox while on phone Spo2 95%, Pulse 80. Pt used one dose of albuterol this AM, had Neb tx last PM, Singulair at night. Advised she can use her albuterol  times a day as needed and can add antihistamine for relief.     OTHER: N/a    DISPOSITION: Callback From Office Today (overriding See Today or Tomorrow in Office)      Reason for Disposition   Patient wants to be seen    Answer Assessment - Initial Assessment Questions  1. ONSET: \"When did the cough begin?\"       Today   2. SEVERITY: \"How bad is the cough today?\"       Mild   3. SPUTUM: \"Describe the color of your sputum\" (e.g., none, dry cough; clear, white, yellow, green)      Denies sputum   4. HEMOPTYSIS: \"Are you coughing up any blood?\" If Yes, ask: \"How much?\" (e.g., flecks, streaks, tablespoons, etc.)      NO   5. DIFFICULTY BREATHING: \"Are you having difficulty breathing?\" If Yes, ask: \"How bad is it?\" (e.g., mild, moderate, severe)       NO   6. FEVER: \"Do you have a fever?\" If Yes, ask: \"What is your temperature, how was it measured, and when did " "it start?\"      NO   7. CARDIAC HISTORY: \"Do you have any history of heart disease?\" (e.g., heart attack, congestive heart failure)       NO   8. LUNG HISTORY: \"Do you have any history of lung disease?\"  (e.g., pulmonary embolus, asthma, emphysema)      COPD,BRONCHIAL ASTHMA   9. PE RISK FACTORS: \"Do you have a history of blood clots?\" (or: recent major surgery, recent prolonged travel, bedridden)      NO   10. OTHER SYMPTOMS: \"Do you have any other symptoms?\" (e.g., runny nose, wheezing, chest pain)        Pins and tingling feeling to chest   12. TRAVEL: \"Have you traveled out of the country in the last month?\" (e.g., travel history, exposures)        Traveled from [place] on 4/31    Protocols used: Cough-Adult-[place]    "

## 2025-05-19 NOTE — TELEPHONE ENCOUNTER
Pt aware verbalized understanding. States pins and needed feeling has not returned. Pt just finished nebulizer treatment while on phone. Nothing further needed at this time.

## 2025-05-22 NOTE — TELEPHONE ENCOUNTER
Patient calling to update provider she is feeling 50% better and thanks provider for helping her.

## 2025-05-29 ENCOUNTER — TELEPHONE (OUTPATIENT)
Age: 85
End: 2025-05-29

## 2025-05-29 NOTE — TELEPHONE ENCOUNTER
Follow Up Call    Pt called to confirm it is ok for her to resume her normal activity.     Stated she walked upstairs, was making beds, included bunk beds, felt a little winded afterwards, O2 sat 90%, recovered to 95%. Pt states felt good though, wanted to make sure it was ok. Advised that was amazing she did all that minimal SOB. Confirmed no lingering SOB, pt denied SOB.     Patient had no further questions and/or concerns at this time.

## 2025-06-04 ENCOUNTER — TELEPHONE (OUTPATIENT)
Age: 85
End: 2025-06-04

## 2025-06-04 NOTE — TELEPHONE ENCOUNTER
Patient calling stating she does not like the mask she has right now she has no patience for it. Asking for either nasal pillows or a smaller mask. Asking if she should go to the company to have fitted. There is an order in there for the pillows but she has not received, please advise.

## 2025-06-04 NOTE — TELEPHONE ENCOUNTER
If I already ordered the pillows can we make sure the order was sent and then she can follow up with them? Thanks.

## 2025-06-04 NOTE — TELEPHONE ENCOUNTER
Pt calls and states that DME keep calling her in regards to small mask. Pt states she was supposed to receive a smaller mask. Pt questing we follow up with DME and call her back with any updates

## 2025-06-10 NOTE — TELEPHONE ENCOUNTER
Pt calling and states Nose pillows never came and wondering if we can follow up on this for her and call back with any updates

## 2025-06-19 ENCOUNTER — NURSE TRIAGE (OUTPATIENT)
Age: 85
End: 2025-06-19

## 2025-06-19 ENCOUNTER — OFFICE VISIT (OUTPATIENT)
Dept: FAMILY MEDICINE CLINIC | Facility: CLINIC | Age: 85
End: 2025-06-19
Payer: MEDICARE

## 2025-06-19 VITALS
OXYGEN SATURATION: 95 % | SYSTOLIC BLOOD PRESSURE: 112 MMHG | HEIGHT: 62 IN | BODY MASS INDEX: 28.89 KG/M2 | RESPIRATION RATE: 18 BRPM | DIASTOLIC BLOOD PRESSURE: 68 MMHG | WEIGHT: 157 LBS | HEART RATE: 70 BPM

## 2025-06-19 DIAGNOSIS — N30.00 ACUTE CYSTITIS WITHOUT HEMATURIA: ICD-10-CM

## 2025-06-19 DIAGNOSIS — R30.9 PAINFUL URINATION: Primary | ICD-10-CM

## 2025-06-19 LAB
SL AMB  POCT GLUCOSE, UA: ABNORMAL
SL AMB LEUKOCYTE ESTERASE,UA: ABNORMAL
SL AMB POCT BILIRUBIN,UA: ABNORMAL
SL AMB POCT BLOOD,UA: ABNORMAL
SL AMB POCT CLARITY,UA: CLEAR
SL AMB POCT COLOR,UA: YELLOW
SL AMB POCT KETONES,UA: ABNORMAL
SL AMB POCT NITRITE,UA: ABNORMAL
SL AMB POCT PH,UA: 6.5
SL AMB POCT SPECIFIC GRAVITY,UA: 1.01
SL AMB POCT URINE PROTEIN: ABNORMAL
SL AMB POCT UROBILINOGEN: ABNORMAL

## 2025-06-19 PROCEDURE — 81002 URINALYSIS NONAUTO W/O SCOPE: CPT | Performed by: NURSE PRACTITIONER

## 2025-06-19 PROCEDURE — 87086 URINE CULTURE/COLONY COUNT: CPT | Performed by: NURSE PRACTITIONER

## 2025-06-19 PROCEDURE — G2211 COMPLEX E/M VISIT ADD ON: HCPCS | Performed by: NURSE PRACTITIONER

## 2025-06-19 PROCEDURE — 99213 OFFICE O/P EST LOW 20 MIN: CPT | Performed by: NURSE PRACTITIONER

## 2025-06-19 RX ORDER — SULFAMETHOXAZOLE AND TRIMETHOPRIM 800; 160 MG/1; MG/1
1 TABLET ORAL 2 TIMES DAILY
Qty: 14 TABLET | Refills: 0 | Status: SHIPPED | OUTPATIENT
Start: 2025-06-19 | End: 2025-06-26

## 2025-06-19 NOTE — TELEPHONE ENCOUNTER
"REASON FOR CONVERSATION: Painful Urination    SYMPTOMS: increased urinary frequency, bladder pressure    OTHER HEALTH INFORMATION: history of UTIs and states this is similar     PROTOCOL DISPOSITION: See Today in Office    CARE ADVICE PROVIDED: office visit today, call back with any concerns    PRACTICE FOLLOW-UP: none          Reason for Disposition   Age > 50 years    Answer Assessment - Initial Assessment Questions  1. SEVERITY: \"How bad is the pain?\"  (e.g., Scale 1-10; mild, moderate, or severe)      4/10 - pressure    2. FREQUENCY: \"How many times have you had painful urination today?\"       4     3. PATTERN: \"Is pain present every time you urinate or just sometimes?\"       Constant    4. ONSET: \"When did the painful urination start?\"       Today at 8:30    5. FEVER: \"Do you have a fever?\" If Yes, ask: \"What is your temperature, how was it measured, and when did it start?\"      Denies    6. PAST UTI: \"Have you had a urine infection before?\" If Yes, ask: \"When was the last time?\" and \"What happened that time?\"       Yes - gets medicine    7. CAUSE: \"What do you think is causing the painful urination?\"  (e.g., UTI, scratch, Herpes sore)      UTI    8. OTHER SYMPTOMS: \"Do you have any other symptoms?\" (e.g., blood in urine, flank pain, genital sores, urgency, vaginal discharge)      Increased frequency    Protocols used: Urination Pain - Female-Adult-[place]    "

## 2025-06-19 NOTE — PROGRESS NOTES
"Name: Roslyn Manzo      : 1940      MRN: [id number]  Encounter Provider: MARIO ALBERTO Townsend  Encounter Date: 2025   Encounter department: [facility]  :  Assessment & Plan  Acute cystitis without hematuria  Patient is here with complaints of having urinary frequency dysuria ongoing for more than 3 days.  Tested positive with urine dip urinary tract infection.  Bactrim ordered.  Maintain good fluid hydration maintain proper hygiene.  Encouraged to empty bladder every 2 hours  Orders:    sulfamethoxazole-trimethoprim (BACTRIM DS) 800-160 mg per tablet; Take 1 tablet by mouth in the morning and 1 tablet before bedtime. Do all this for 7 days.    Painful urination  Patient is here with complaints of having urinary frequency dysuria ongoing for more than 3 days.  Orders:    POCT urine dip    Urine culture; Future           History of Present Illness   Patient is here with complaints of dysuria      Review of Systems   Constitutional: Negative.  Negative for fatigue.   HENT: Negative.     Eyes: Negative.    Respiratory: Negative.     Cardiovascular: Negative.    Gastrointestinal: Negative.    Endocrine: Negative.    Genitourinary:  Positive for difficulty urinating, dysuria and frequency. Negative for flank pain.   Musculoskeletal: Negative.    Allergic/Immunologic: Negative.    Neurological: Negative.    Psychiatric/Behavioral: Negative.         Objective   /68 (BP Location: Left arm, Patient Position: Sitting, Cuff Size: Standard)   Pulse 70   Resp 18   Ht 5' 2\" (1.575 m)   Wt 71.2 kg (157 lb)   SpO2 95%   BMI 28.72 kg/m²      Physical Exam  Constitutional:       General: She is not in acute distress.     Appearance: Normal appearance. She is not ill-appearing.   HENT:      Head: Normocephalic and atraumatic.      Nose: Nose normal.     Cardiovascular:      Rate and Rhythm: Normal rate and regular rhythm.      Pulses: Normal pulses.      Heart sounds: Normal heart " sounds.   Pulmonary:      Effort: Pulmonary effort is normal. No respiratory distress.      Breath sounds: Normal breath sounds.   Chest:      Chest wall: No tenderness.   Abdominal:      General: Abdomen is flat. Bowel sounds are normal. There is no distension.      Palpations: There is no mass.      Tenderness: There is no abdominal tenderness. There is no right CVA tenderness or left CVA tenderness.     Musculoskeletal:         General: Normal range of motion.      Cervical back: Normal range of motion and neck supple.     Skin:     General: Skin is warm and dry.     Neurological:      General: No focal deficit present.      Mental Status: She is alert and oriented to person, place, and time.     Psychiatric:         Mood and Affect: Mood normal.         Behavior: Behavior normal.         Thought Content: Thought content normal.         Judgment: Judgment normal.

## 2025-06-20 LAB — BACTERIA UR CULT: NORMAL

## 2025-06-24 ENCOUNTER — NURSE TRIAGE (OUTPATIENT)
Age: 85
End: 2025-06-24

## 2025-06-24 ENCOUNTER — TELEPHONE (OUTPATIENT)
Dept: FAMILY MEDICINE CLINIC | Facility: CLINIC | Age: 85
End: 2025-06-24

## 2025-06-24 DIAGNOSIS — R25.1 TREMOR: Primary | ICD-10-CM

## 2025-06-24 NOTE — TELEPHONE ENCOUNTER
Pt called triage nurse to discuss side effects from Bactrim started 6/20/2025. I called patient and daughter in law (Jodi) was also in the room. She was started on Bactrim for possible UTI 6/20/2025. She notes since taking it she has not felt well, developed hand shakiness, and feeling palpitations earlier today. Pt denies chest pain, SOB, hx of arrhythmias. Pulse ox at home read HR 84 BPM, O2 96%.  Reviewed urine culture from 6/19/2025, which did not confirm infection. She reports urinary symptoms have resolved. Recommended stopping Bactrim. Will order BMP to check potassium. Discussed if palpitations return or worsen, chest pain, SOB, tachycardia would recommend ED evaluation.     Leah Lion PA-C  [facility]  6/24/2025 6:20 PM

## 2025-06-24 NOTE — TELEPHONE ENCOUNTER
"REASON FOR CONVERSATION: Medication Reaction    SYMPTOMS: Heart palpitations and shakiness in hands since yesterday. Heart rate is 82 and O2% 91 during triage call.    OTHER HEALTH INFORMATION: Daughter in law states the patient started her bactrim on 6/20 and reports shakiness started yesterday, and heart palpitations started today. Patent is concerned if she should discontinue the medication.    PROTOCOL DISPOSITION: Discuss With PCP and Callback by Nurse Within 1 Hour    CARE ADVICE PROVIDED: Will discuss with the On call provider and call back with further instructions    PRACTICE FOLLOW-UP: Secure chat on call provider for further recommendations.      Reason for Disposition   Caller has URGENT medicine question about med that PCP or specialist prescribed and triager unable to answer question    Answer Assessment - Initial Assessment Questions  1. NAME of MEDICINE: \"What medicine(s) are you calling about?\"      Bactrim 800-160 MG    2. QUESTION: \"What is your question?\" (e.g., double dose of medicine, side effect)      Patient is experiencing shakiness and heart palpitations    3. PRESCRIBER: \"Who prescribed the medicine?\" Reason: if prescribed by specialist, call should be referred to that group.      MARIO ALBERTO Townsend    4. SYMPTOMS: \"Do you have any symptoms?\" If Yes, ask: \"What symptoms are you having?\"  \"How bad are the symptoms (e.g., mild, moderate, severe)    Protocols used: Medication Question Call-Adult-[place]    "

## 2025-06-25 ENCOUNTER — APPOINTMENT (OUTPATIENT)
Dept: LAB | Facility: CLINIC | Age: 85
End: 2025-06-25
Payer: MEDICARE

## 2025-06-25 DIAGNOSIS — E53.8 B12 DEFICIENCY: ICD-10-CM

## 2025-06-25 DIAGNOSIS — R68.89 COLD INTOLERANCE: ICD-10-CM

## 2025-06-25 DIAGNOSIS — R25.1 TREMOR: ICD-10-CM

## 2025-06-25 LAB
ANION GAP SERPL CALCULATED.3IONS-SCNC: 12 MMOL/L (ref 4–13)
BUN SERPL-MCNC: 20 MG/DL (ref 5–25)
CALCIUM SERPL-MCNC: 9.2 MG/DL (ref 8.4–10.2)
CHLORIDE SERPL-SCNC: 99 MMOL/L (ref 96–108)
CO2 SERPL-SCNC: 25 MMOL/L (ref 21–32)
CREAT SERPL-MCNC: 1.1 MG/DL (ref 0.6–1.3)
GFR SERPL CREATININE-BSD FRML MDRD: 46 ML/MIN/1.73SQ M
GLUCOSE SERPL-MCNC: 99 MG/DL (ref 65–140)
POTASSIUM SERPL-SCNC: 3.9 MMOL/L (ref 3.5–5.3)
SODIUM SERPL-SCNC: 136 MMOL/L (ref 135–147)
VIT B12 SERPL-MCNC: 197 PG/ML (ref 180–914)

## 2025-06-25 PROCEDURE — 80048 BASIC METABOLIC PNL TOTAL CA: CPT

## 2025-06-25 PROCEDURE — 36415 COLL VENOUS BLD VENIPUNCTURE: CPT

## 2025-06-25 PROCEDURE — 82607 VITAMIN B-12: CPT

## 2025-06-25 NOTE — TELEPHONE ENCOUNTER
"Patient called back, got her labs done, asked for results, advised that are still being processed.    She says she is feeling 100% better. Heart isn't racing. \"Hands are shaking normal for an old person.\"    Please call once lab results are ready. She doesn't use [employer]. Thank you!   "

## 2025-06-25 NOTE — TELEPHONE ENCOUNTER
Looks like patient went to the lab this morning.  I left a message on her machine asking her to call back with status update.

## 2025-06-26 ENCOUNTER — RESULTS FOLLOW-UP (OUTPATIENT)
Dept: FAMILY MEDICINE CLINIC | Facility: CLINIC | Age: 85
End: 2025-06-26

## 2025-06-26 ENCOUNTER — TELEPHONE (OUTPATIENT)
Dept: FAMILY MEDICINE CLINIC | Facility: CLINIC | Age: 85
End: 2025-06-26

## 2025-06-26 NOTE — TELEPHONE ENCOUNTER
Left message on machine informing patient and to call back if she has questions     ----- Message from Lelia Mccord DO sent at 6/26/2025  7:42 AM EDT -----  Please let patient know that all is well with her labs. Her electrolytes are normal. If her UTI symptoms have resolved she doesn't need to resume the antibiotic.   ----- Message -----  From: Lab, Background User  Sent: 6/25/2025   7:38 PM EDT  To: Lelia Mccord DO

## 2025-06-26 NOTE — TELEPHONE ENCOUNTER
Patient was rescheduled for 6/30/25 to see Dr Mccord and get her B12.  Patient stated that her breathing was fine now and her heart rate is now 81. Patient stated that she still was having a little pressure on her chest. Patient was advised to go to the ER per Dr Mccord, DO if chest pressure continues and symptoms worsen.

## 2025-06-30 ENCOUNTER — APPOINTMENT (OUTPATIENT)
Dept: LAB | Facility: CLINIC | Age: 85
End: 2025-06-30
Payer: MEDICARE

## 2025-06-30 ENCOUNTER — OFFICE VISIT (OUTPATIENT)
Dept: FAMILY MEDICINE CLINIC | Facility: CLINIC | Age: 85
End: 2025-06-30
Payer: MEDICARE

## 2025-06-30 VITALS
WEIGHT: 153.5 LBS | HEIGHT: 62 IN | DIASTOLIC BLOOD PRESSURE: 80 MMHG | SYSTOLIC BLOOD PRESSURE: 110 MMHG | OXYGEN SATURATION: 92 % | TEMPERATURE: 98.6 F | BODY MASS INDEX: 28.25 KG/M2 | HEART RATE: 85 BPM

## 2025-06-30 DIAGNOSIS — R44.3 HALLUCINATIONS: ICD-10-CM

## 2025-06-30 DIAGNOSIS — E53.8 B12 DEFICIENCY: ICD-10-CM

## 2025-06-30 DIAGNOSIS — R25.1 OCCASIONAL TREMORS: ICD-10-CM

## 2025-06-30 DIAGNOSIS — R73.03 PREDIABETES: ICD-10-CM

## 2025-06-30 DIAGNOSIS — R25.1 OCCASIONAL TREMORS: Primary | ICD-10-CM

## 2025-06-30 LAB
ALBUMIN SERPL BCG-MCNC: 4.4 G/DL (ref 3.5–5)
ALP SERPL-CCNC: 86 U/L (ref 34–104)
ALT SERPL W P-5'-P-CCNC: 17 U/L (ref 7–52)
ANION GAP SERPL CALCULATED.3IONS-SCNC: 12 MMOL/L (ref 4–13)
AST SERPL W P-5'-P-CCNC: 23 U/L (ref 13–39)
BILIRUB SERPL-MCNC: 0.64 MG/DL (ref 0.2–1)
BUN SERPL-MCNC: 24 MG/DL (ref 5–25)
CALCIUM SERPL-MCNC: 9.3 MG/DL (ref 8.4–10.2)
CHLORIDE SERPL-SCNC: 98 MMOL/L (ref 96–108)
CO2 SERPL-SCNC: 28 MMOL/L (ref 21–32)
CREAT SERPL-MCNC: 0.83 MG/DL (ref 0.6–1.3)
ERYTHROCYTE [DISTWIDTH] IN BLOOD BY AUTOMATED COUNT: 13.3 % (ref 11.6–15.1)
EST. AVERAGE GLUCOSE BLD GHB EST-MCNC: 131 MG/DL
GFR SERPL CREATININE-BSD FRML MDRD: 65 ML/MIN/1.73SQ M
GLUCOSE P FAST SERPL-MCNC: 107 MG/DL (ref 65–99)
HBA1C MFR BLD: 6.2 %
HCT VFR BLD AUTO: 42.3 % (ref 34.8–46.1)
HGB BLD-MCNC: 14.1 G/DL (ref 11.5–15.4)
MCH RBC QN AUTO: 30.1 PG (ref 26.8–34.3)
MCHC RBC AUTO-ENTMCNC: 33.3 G/DL (ref 31.4–37.4)
MCV RBC AUTO: 90 FL (ref 82–98)
PLATELET # BLD AUTO: 217 THOUSANDS/UL (ref 149–390)
PMV BLD AUTO: 10.6 FL (ref 8.9–12.7)
POTASSIUM SERPL-SCNC: 3.6 MMOL/L (ref 3.5–5.3)
PROT SERPL-MCNC: 6.7 G/DL (ref 6.4–8.4)
RBC # BLD AUTO: 4.68 MILLION/UL (ref 3.81–5.12)
SODIUM SERPL-SCNC: 138 MMOL/L (ref 135–147)
TSH SERPL DL<=0.05 MIU/L-ACNC: 0.94 UIU/ML (ref 0.45–4.5)
WBC # BLD AUTO: 5.28 THOUSAND/UL (ref 4.31–10.16)

## 2025-06-30 PROCEDURE — 84443 ASSAY THYROID STIM HORMONE: CPT

## 2025-06-30 PROCEDURE — 96372 THER/PROPH/DIAG INJ SC/IM: CPT | Performed by: STUDENT IN AN ORGANIZED HEALTH CARE EDUCATION/TRAINING PROGRAM

## 2025-06-30 PROCEDURE — 83036 HEMOGLOBIN GLYCOSYLATED A1C: CPT

## 2025-06-30 PROCEDURE — 80053 COMPREHEN METABOLIC PANEL: CPT

## 2025-06-30 PROCEDURE — 80307 DRUG TEST PRSMV CHEM ANLYZR: CPT

## 2025-06-30 PROCEDURE — G2211 COMPLEX E/M VISIT ADD ON: HCPCS | Performed by: STUDENT IN AN ORGANIZED HEALTH CARE EDUCATION/TRAINING PROGRAM

## 2025-06-30 PROCEDURE — 99213 OFFICE O/P EST LOW 20 MIN: CPT | Performed by: STUDENT IN AN ORGANIZED HEALTH CARE EDUCATION/TRAINING PROGRAM

## 2025-06-30 PROCEDURE — 36415 COLL VENOUS BLD VENIPUNCTURE: CPT

## 2025-06-30 PROCEDURE — 85027 COMPLETE CBC AUTOMATED: CPT

## 2025-06-30 RX ORDER — CYANOCOBALAMIN 1000 UG/ML
1000 INJECTION, SOLUTION INTRAMUSCULAR; SUBCUTANEOUS
Status: SHIPPED | OUTPATIENT
Start: 2025-06-30

## 2025-06-30 RX ADMIN — CYANOCOBALAMIN 1000 MCG: 1000 INJECTION, SOLUTION INTRAMUSCULAR; SUBCUTANEOUS at 13:12

## 2025-06-30 NOTE — ASSESSMENT & PLAN NOTE
Unclear etiology of symptoms.  Patient has discontinued medication over 7 days ago also likely not related to medication.  I am concerned about patient's drinking as she does have a history of frequent whiskey use.  She does report that she has not drank in the past 8 weeks.  At this time we will check labs including CBC, CMP, TSH and A1c.  Will also check drug use panel as well as a ethanol level.  Lastly due to new onset forgetfulness and persistent tremors we will check a CT head.  Will follow-up pending all results  Orders:  •  TSH, 3rd generation with Free T4 reflex; Future  •  CBC and Platelet; Future  •  Comprehensive metabolic panel; Future  •  CT head wo contrast; Future

## 2025-06-30 NOTE — PROGRESS NOTES
Name: Roslyn Manzo      : 1940      MRN: [id number]  Encounter Provider: Lelia Mccord DO  Encounter Date: 2025   Encounter department: [facility]  :  Assessment & Plan  Prediabetes    Orders:  •  Hemoglobin A1C; Future    Occasional tremors  Unclear etiology of symptoms.  Patient has discontinued medication over 7 days ago also likely not related to medication.  I am concerned about patient's drinking as she does have a history of frequent whiskey use.  She does report that she has not drank in the past 8 weeks.  At this time we will check labs including CBC, CMP, TSH and A1c.  Will also check drug use panel as well as a ethanol level.  Lastly due to new onset forgetfulness and persistent tremors we will check a CT head.  Will follow-up pending all results  Orders:  •  TSH, 3rd generation with Free T4 reflex; Future  •  CBC and Platelet; Future  •  Comprehensive metabolic panel; Future  •  CT head wo contrast; Future    Hallucinations    Orders:  •  Ethanol, urine; Future  •  Drug screen panel 1, whole blood; Future  •  CT head wo contrast; Future           History of Present Illness   Patient presents today with concerns regarding tremors, hallucinations and chest pain.  Patient reports her symptoms started shortly after starting Bactrim for UTI.  States that she took the medication for 3 days and during this time her symptoms were ongoing and then stopped 2 days after stopping the antibiotic.  Patient reports that she was seeing bicycles, also having pressure-like chest pain and worsening tremors.  Since then reports that all symptoms have resolved aside from the tremors but states that she overall does not feel well.  Patient reports that her last drink was prior to her last visit on  with me.  Denies any active drinking.  States that she has been under a lot of stress with her family and also feels like she has been getting more forgetful.      Review  "of Systems   Constitutional:  Negative for chills and fever.   HENT:  Negative for congestion and sinus pressure.    Respiratory:  Negative for cough and shortness of breath.    Cardiovascular:  Negative for chest pain and palpitations.   Gastrointestinal:  Negative for abdominal pain.   Neurological:  Positive for tremors. Negative for dizziness and headaches.       Objective   /80 (BP Location: Left arm, Patient Position: Sitting, Cuff Size: Adult)   Pulse 85   Temp 98.6 °F (37 °C) (Temporal)   Ht 5' 2\" (1.575 m)   Wt 69.6 kg (153 lb 8 oz)   SpO2 92%   BMI 28.08 kg/m²      Physical Exam  Vitals reviewed.   Constitutional:       Appearance: Normal appearance.   HENT:      Head: Normocephalic and atraumatic.     Eyes:      Extraocular Movements: Extraocular movements intact.       Cardiovascular:      Rate and Rhythm: Normal rate and regular rhythm.      Heart sounds: Normal heart sounds.   Pulmonary:      Effort: Pulmonary effort is normal.      Breath sounds: Normal breath sounds.     Musculoskeletal:      Right lower leg: No edema.      Left lower leg: No edema.     Neurological:      General: No focal deficit present.      Mental Status: She is alert and oriented to person, place, and time.     Psychiatric:         Mood and Affect: Mood normal.         "

## 2025-07-01 ENCOUNTER — APPOINTMENT (OUTPATIENT)
Dept: LAB | Facility: CLINIC | Age: 85
End: 2025-07-01
Attending: STUDENT IN AN ORGANIZED HEALTH CARE EDUCATION/TRAINING PROGRAM
Payer: MEDICARE

## 2025-07-01 PROCEDURE — 80307 DRUG TEST PRSMV CHEM ANLYZR: CPT

## 2025-07-02 ENCOUNTER — TELEPHONE (OUTPATIENT)
Age: 85
End: 2025-07-02

## 2025-07-02 NOTE — TELEPHONE ENCOUNTER
Patient is calling for lab results.  Please contact her when all is in and doctor has resulted them.  Thank you.

## 2025-07-03 ENCOUNTER — RESULTS FOLLOW-UP (OUTPATIENT)
Dept: FAMILY MEDICINE CLINIC | Facility: CLINIC | Age: 85
End: 2025-07-03

## 2025-07-03 NOTE — TELEPHONE ENCOUNTER
Pt called back regarding results. Gave permission to read verbatim. Pt had no further questions. Pt also states she did the urine culture.

## 2025-07-03 NOTE — TELEPHONE ENCOUNTER
----- Message from Lelia Mccord DO sent at 7/3/2025  2:20 PM EDT -----  Please let patient know that her A1c is exactly the same 6.2, her kidney and liver function is stable, thyroid function is normal, CBC which includes a white blood cells, hemoglobin and platelets are   normal.  Please let patient know that the labs are largely normal I am waiting for 1 or 2 more things to still result looks like the urine has not been collected she still needs to have that done at   the lab.  ----- Message -----  From: Lab, Background User  Sent: 6/30/2025   6:57 PM EDT  To: Lelia Mccord DO

## 2025-07-03 NOTE — TELEPHONE ENCOUNTER
Patient called to follow up on test results. Informed pt that most of the results are back and 2 are still in process but they have not yet been reviewed by provider. Informed that a message will be sent to PCP to interpret results and a clinical staff would call her back with further advise by provider. Patient understood and no further questions currently.

## 2025-07-07 ENCOUNTER — HOSPITAL ENCOUNTER (OUTPATIENT)
Dept: CT IMAGING | Facility: CLINIC | Age: 85
Discharge: HOME | End: 2025-07-07
Attending: STUDENT IN AN ORGANIZED HEALTH CARE EDUCATION/TRAINING PROGRAM
Payer: MEDICARE

## 2025-07-07 DIAGNOSIS — R44.3 HALLUCINATIONS: ICD-10-CM

## 2025-07-07 DIAGNOSIS — R25.1 OCCASIONAL TREMORS: ICD-10-CM

## 2025-07-07 PROCEDURE — 70450 CT HEAD/BRAIN W/O DYE: CPT

## 2025-07-08 ENCOUNTER — TELEPHONE (OUTPATIENT)
Age: 85
End: 2025-07-08

## 2025-07-08 NOTE — TELEPHONE ENCOUNTER
Left voice mail message advising patient that the [facility] schedule is open for November and they are due to see pulmonary provider, Shawn Sepulveda.Requested they call back to schedule appointment [phone].

## 2025-07-09 ENCOUNTER — TELEPHONE (OUTPATIENT)
Age: 85
End: 2025-07-09

## 2025-07-09 DIAGNOSIS — J44.89 ASTHMA WITH COPD (CHRONIC OBSTRUCTIVE PULMONARY DISEASE) (HCC): ICD-10-CM

## 2025-07-09 LAB
AMPHETAMINES SERPL QL SCN: NEGATIVE NG/ML
BARBITURATES SERPL QL SCN: NEGATIVE UG/ML
BENZODIAZ SPEC QL: NEGATIVE NG/ML
CANNABINOIDS SERPL QL SCN: NEGATIVE NG/ML
COCAINE+BZE SERPL QL SCN: NEGATIVE NG/ML
OPIATES SERPL QL SCN: NEGATIVE NG/ML
OXYCODONE+OXYMORPHONE SERPLBLD QL SCN: NEGATIVE NG/ML
PCP SERPL QL SCN: NEGATIVE NG/ML

## 2025-07-09 RX ORDER — BUDESONIDE AND FORMOTEROL FUMARATE DIHYDRATE 160; 4.5 UG/1; UG/1
AEROSOL RESPIRATORY (INHALATION)
Qty: 30.6 G | Refills: 1 | Status: SHIPPED | OUTPATIENT
Start: 2025-07-09

## 2025-07-09 NOTE — TELEPHONE ENCOUNTER
Patient called after receiving a text about results. She is unable to acces her My chart at this time. Relayed Lab result recommendations:      Patient verbalized understanding, and asks to please be called when Urine and CT results come in. She can be reached at [phone].

## 2025-07-14 DIAGNOSIS — E03.9 ACQUIRED HYPOTHYROIDISM: ICD-10-CM

## 2025-07-15 ENCOUNTER — OFFICE VISIT (OUTPATIENT)
Dept: FAMILY MEDICINE CLINIC | Facility: CLINIC | Age: 85
End: 2025-07-15
Payer: MEDICARE

## 2025-07-15 ENCOUNTER — TELEPHONE (OUTPATIENT)
Age: 85
End: 2025-07-15

## 2025-07-15 VITALS
WEIGHT: 155.25 LBS | DIASTOLIC BLOOD PRESSURE: 64 MMHG | BODY MASS INDEX: 28.57 KG/M2 | HEIGHT: 62 IN | TEMPERATURE: 97.6 F | SYSTOLIC BLOOD PRESSURE: 100 MMHG | HEART RATE: 82 BPM | OXYGEN SATURATION: 95 %

## 2025-07-15 DIAGNOSIS — F41.8 DEPRESSION WITH ANXIETY: ICD-10-CM

## 2025-07-15 DIAGNOSIS — R13.10 DYSPHAGIA, UNSPECIFIED TYPE: ICD-10-CM

## 2025-07-15 DIAGNOSIS — K21.9 GASTROESOPHAGEAL REFLUX DISEASE WITHOUT ESOPHAGITIS: ICD-10-CM

## 2025-07-15 DIAGNOSIS — G47.33 OSA ON CPAP: Primary | ICD-10-CM

## 2025-07-15 PROCEDURE — G2211 COMPLEX E/M VISIT ADD ON: HCPCS | Performed by: STUDENT IN AN ORGANIZED HEALTH CARE EDUCATION/TRAINING PROGRAM

## 2025-07-15 PROCEDURE — 99214 OFFICE O/P EST MOD 30 MIN: CPT | Performed by: STUDENT IN AN ORGANIZED HEALTH CARE EDUCATION/TRAINING PROGRAM

## 2025-07-15 RX ORDER — FLUOXETINE HYDROCHLORIDE 40 MG/1
40 CAPSULE ORAL 2 TIMES DAILY
Qty: 180 CAPSULE | Refills: 1 | Status: SHIPPED | OUTPATIENT
Start: 2025-07-15

## 2025-07-15 RX ORDER — PANTOPRAZOLE SODIUM 40 MG/1
40 TABLET, DELAYED RELEASE ORAL DAILY
Qty: 30 TABLET | Refills: 5 | Status: SHIPPED | OUTPATIENT
Start: 2025-07-15

## 2025-07-15 RX ORDER — MELOXICAM 15 MG/1
15 TABLET ORAL DAILY
COMMUNITY

## 2025-07-16 RX ORDER — LEVOTHYROXINE SODIUM 50 UG/1
50 TABLET ORAL DAILY
Qty: 90 TABLET | Refills: 1 | Status: SHIPPED | OUTPATIENT
Start: 2025-07-16

## 2025-07-30 ENCOUNTER — CLINICAL SUPPORT (OUTPATIENT)
Dept: FAMILY MEDICINE CLINIC | Facility: CLINIC | Age: 85
End: 2025-07-30
Payer: MEDICARE

## 2025-07-30 DIAGNOSIS — E53.8 B12 DEFICIENCY: Primary | ICD-10-CM

## 2025-07-30 PROCEDURE — 96372 THER/PROPH/DIAG INJ SC/IM: CPT | Performed by: STUDENT IN AN ORGANIZED HEALTH CARE EDUCATION/TRAINING PROGRAM

## 2025-07-30 RX ADMIN — CYANOCOBALAMIN 1000 MCG: 1000 INJECTION, SOLUTION INTRAMUSCULAR; SUBCUTANEOUS at 10:06

## 2025-07-31 ENCOUNTER — OFFICE VISIT (OUTPATIENT)
Dept: FAMILY MEDICINE CLINIC | Facility: CLINIC | Age: 85
End: 2025-07-31
Payer: MEDICARE

## 2025-07-31 VITALS
HEART RATE: 78 BPM | DIASTOLIC BLOOD PRESSURE: 60 MMHG | TEMPERATURE: 98 F | OXYGEN SATURATION: 95 % | WEIGHT: 155 LBS | BODY MASS INDEX: 28.52 KG/M2 | HEIGHT: 62 IN | SYSTOLIC BLOOD PRESSURE: 100 MMHG

## 2025-07-31 DIAGNOSIS — L50.9 URTICARIA: Primary | ICD-10-CM

## 2025-07-31 PROCEDURE — G2211 COMPLEX E/M VISIT ADD ON: HCPCS | Performed by: STUDENT IN AN ORGANIZED HEALTH CARE EDUCATION/TRAINING PROGRAM

## 2025-07-31 PROCEDURE — 99213 OFFICE O/P EST LOW 20 MIN: CPT | Performed by: STUDENT IN AN ORGANIZED HEALTH CARE EDUCATION/TRAINING PROGRAM

## 2025-07-31 RX ORDER — METHYLPREDNISOLONE 4 MG/1
TABLET ORAL
Qty: 21 EACH | Refills: 0 | Status: SHIPPED | OUTPATIENT
Start: 2025-07-31

## 2025-08-11 ENCOUNTER — TELEPHONE (OUTPATIENT)
Age: 85
End: 2025-08-11

## (undated) DEVICE — SHEATH 1912020 5PK 4MM/70DEG STORZ LINV: Brand: ENDO-SCRUB®

## (undated) DEVICE — SUT CHROMIC 4-0 RB-1 27 IN U203H

## (undated) DEVICE — SHEATH 1912000 5PK 4MM/0DEG STORZ XOMED: Brand: ENDO-SCRUB®

## (undated) DEVICE — ANTI-FOG SOLUTION WITH FOAM PAD: Brand: DEVON

## (undated) DEVICE — AVITENE MICROFIBRILLAR COLLAGEN HEMOSTAT FLOUR: Brand: AVITENE FLOUR

## (undated) DEVICE — SPLINT 1524050 5PK PAIR DOYLE II AIRWAY: Brand: DOYLE II ™

## (undated) DEVICE — MAGNETIC INSTRUMENT PAD 16" X 20"; LARGE; DISPOSABLE: Brand: CARDINAL HEALTH

## (undated) DEVICE — NEURO PATTIES 1/2 X 3

## (undated) DEVICE — SHEATH 1912010 5PK 4MM/30DEG STORZ XOMED: Brand: ENDO-SCRUB®

## (undated) DEVICE — SPECIMEN TRAP: Brand: ARGYLE

## (undated) DEVICE — SPLINT INTRANASAL 0.6 X 2 IN POSISEP X

## (undated) DEVICE — GAUZE SPONGES,16 PLY: Brand: CURITY

## (undated) DEVICE — TUBING 1912030 ENDO-SCRUB 2 5PK: Brand: ENDO-SCRUB®

## (undated) DEVICE — INSTRUMENT TRACKER 9733533XOM ENT 1PK

## (undated) DEVICE — GLOVE SRG BIOGEL 7.5

## (undated) DEVICE — 3000CC GUARDIAN II: Brand: GUARDIAN

## (undated) DEVICE — TUBING SUCTION 5MM X 12 FT

## (undated) DEVICE — GAUZE SPONGES,USP TYPE VII GAUZE, 12 PLY: Brand: CURITY

## (undated) DEVICE — TUBING 1895522 5PK STRAIGHTSHOT TO XPS: Brand: STRAIGHTSHOT®

## (undated) DEVICE — SUT PROLENE 3-0 SH 36 IN 8522H

## (undated) DEVICE — PATIENT TRACKER 9734887XOM NON-INVASIVE

## (undated) DEVICE — BLADE 1884080EM TRICUT 4MMX13CM M4 ROHS: Brand: FUSION®

## (undated) DEVICE — STERILE NASAL PACK: Brand: CARDINAL HEALTH

## (undated) DEVICE — BUR 1884070RTD TAP DIAMOND 70DG 4MM 30K

## (undated) DEVICE — SYRINGE 10ML LL

## (undated) DEVICE — MAYO STAND COVER: Brand: CONVERTORS

## (undated) DEVICE — DRAPE SURGIKIT SADDLE BAG